# Patient Record
Sex: FEMALE | Race: WHITE | NOT HISPANIC OR LATINO | Employment: OTHER | URBAN - METROPOLITAN AREA
[De-identification: names, ages, dates, MRNs, and addresses within clinical notes are randomized per-mention and may not be internally consistent; named-entity substitution may affect disease eponyms.]

---

## 2018-10-18 ENCOUNTER — OFFICE VISIT CONVERTED (OUTPATIENT)
Dept: ORTHOPEDIC SURGERY | Facility: CLINIC | Age: 63
End: 2018-10-18
Attending: ORTHOPAEDIC SURGERY

## 2018-10-18 ENCOUNTER — CONVERSION ENCOUNTER (OUTPATIENT)
Dept: ORTHOPEDIC SURGERY | Facility: CLINIC | Age: 63
End: 2018-10-18

## 2018-10-30 ENCOUNTER — OFFICE VISIT CONVERTED (OUTPATIENT)
Dept: NEUROLOGY | Facility: CLINIC | Age: 63
End: 2018-10-30
Attending: PSYCHIATRY & NEUROLOGY

## 2018-11-05 ENCOUNTER — OFFICE VISIT CONVERTED (OUTPATIENT)
Dept: ORTHOPEDIC SURGERY | Facility: CLINIC | Age: 63
End: 2018-11-05
Attending: ORTHOPAEDIC SURGERY

## 2018-12-13 ENCOUNTER — OFFICE VISIT CONVERTED (OUTPATIENT)
Dept: CARDIOLOGY | Facility: CLINIC | Age: 63
End: 2018-12-13
Attending: SPECIALIST

## 2018-12-13 ENCOUNTER — CONVERSION ENCOUNTER (OUTPATIENT)
Dept: CARDIOLOGY | Facility: CLINIC | Age: 63
End: 2018-12-13

## 2019-01-11 ENCOUNTER — CONVERSION ENCOUNTER (OUTPATIENT)
Dept: CARDIOLOGY | Facility: CLINIC | Age: 64
End: 2019-01-11
Attending: SPECIALIST

## 2019-04-10 ENCOUNTER — HOSPITAL ENCOUNTER (OUTPATIENT)
Dept: SURGERY | Facility: HOSPITAL | Age: 64
Setting detail: HOSPITAL OUTPATIENT SURGERY
Discharge: HOME | End: 2019-04-10
Attending: OPHTHALMOLOGY

## 2019-04-10 LAB — GLUCOSE BLD-MCNC: 241 MG/DL (ref 65–99)

## 2019-04-24 ENCOUNTER — HOSPITAL ENCOUNTER (OUTPATIENT)
Dept: SURGERY | Facility: HOSPITAL | Age: 64
Setting detail: HOSPITAL OUTPATIENT SURGERY
Discharge: HOME | End: 2019-04-24
Attending: OPHTHALMOLOGY

## 2019-04-24 LAB — GLUCOSE BLD-MCNC: 149 MG/DL (ref 65–99)

## 2019-06-12 ENCOUNTER — HOSPITAL ENCOUNTER (OUTPATIENT)
Dept: OTHER | Facility: HOSPITAL | Age: 64
Discharge: HOME | End: 2019-06-12
Attending: PHYSICIAN ASSISTANT

## 2019-06-12 LAB
25(OH)D3 SERPL-MCNC: 59.3 NG/ML (ref 30–100)
ALBUMIN SERPL-MCNC: 4 G/DL (ref 3.5–5)
ALBUMIN/GLOB SERPL: 1.4 {RATIO} (ref 1.4–2.6)
ALP SERPL-CCNC: 139 U/L (ref 43–160)
ALT SERPL-CCNC: 25 U/L (ref 10–40)
ANION GAP SERPL CALC-SCNC: 13 MMOL/L (ref 8–19)
AST SERPL-CCNC: 26 U/L (ref 15–50)
BASE EXCESS BLD CALC-SCNC: 7.2 MMOL/L
BASOPHILS # BLD AUTO: 0.05 10*3/UL (ref 0–0.2)
BASOPHILS NFR BLD AUTO: 0.5 % (ref 0–3)
BILIRUB SERPL-MCNC: <0.15 MG/DL (ref 0.2–1.3)
BUN SERPL-MCNC: 29 MG/DL (ref 5–25)
BUN/CREAT SERPL: 23 {RATIO} (ref 6–20)
CALCIUM SERPL-MCNC: 9.3 MG/DL (ref 8.7–10.4)
CHLORIDE SERPL-SCNC: 94 MMOL/L (ref 99–111)
CHOLEST SERPL-MCNC: 308 MG/DL (ref 107–200)
CHOLEST/HDLC SERPL: 9.6 {RATIO} (ref 3–6)
COHGB MFR BLD: 5.1 % (ref 0–1.5)
CONV ABS IMM GRAN: 0.05 10*3/UL (ref 0–0.2)
CONV ALLEN'S TEST: ABNORMAL
CONV CO2: 36 MMOL/L (ref 22–32)
CONV CREATININE URINE, RANDOM: 57.2 MG/DL (ref 10–300)
CONV FHHB: 14.8 % (ref 0–5)
CONV FIO2: ABNORMAL % (ref 21–100)
CONV IMMATURE GRAN: 0.5 % (ref 0–1.8)
CONV MICROALBUM.,U,RANDOM: <12 MG/L (ref 0–20)
CONV SITE: ABNORMAL
CONV TOTAL PROTEIN: 6.9 G/DL (ref 6.3–8.2)
CREAT UR-MCNC: 1.26 MG/DL (ref 0.5–0.9)
DEPRECATED RDW RBC AUTO: 41.9 FL (ref 36.4–46.3)
EOSINOPHIL # BLD AUTO: 0.26 10*3/UL (ref 0–0.7)
EOSINOPHIL # BLD AUTO: 2.5 % (ref 0–7)
ERYTHROCYTE [DISTWIDTH] IN BLOOD BY AUTOMATED COUNT: 12.6 % (ref 11.7–14.4)
EST. AVERAGE GLUCOSE BLD GHB EST-MCNC: 220 MG/DL
GFR SERPLBLD BASED ON 1.73 SQ M-ARVRAT: 45 ML/MIN/{1.73_M2}
GLOBULIN UR ELPH-MCNC: 2.9 G/DL (ref 2–3.5)
GLUCOSE SERPL-MCNC: 276 MG/DL (ref 65–99)
HBA1C MFR BLD: 13 G/DL (ref 12–16)
HBA1C MFR BLD: 14.1 % (ref 11.7–14.6)
HBA1C MFR BLD: 9.3 % (ref 3.5–5.7)
HCO3 BLDA-SCNC: 35 MMOL/L (ref 22–26)
HCT VFR BLD AUTO: 40.2 % (ref 37–47)
HDLC SERPL-MCNC: 32 MG/DL (ref 40–60)
LABORATORY COMMENT REPORT: ABNORMAL
LDLC SERPL CALC-MCNC: 212 MG/DL (ref 70–100)
LITERS PER MINUTE: ABNORMAL L/MIN
LYMPHOCYTES # BLD AUTO: 3.01 10*3/UL (ref 1–5)
Lab: ABNORMAL
MCH RBC QN AUTO: 29.9 PG (ref 27–31)
MCHC RBC AUTO-ENTMCNC: 32.3 G/DL (ref 33–37)
MCV RBC AUTO: 92.4 FL (ref 81–99)
METHGB MFR BLD: 0.3 % (ref 0–1.5)
MICROALBUMIN/CREAT UR: 21 MG/G{CRE} (ref 0–35)
MONOCYTES # BLD AUTO: 0.72 10*3/UL (ref 0.2–1.2)
MONOCYTES NFR BLD AUTO: 6.8 % (ref 3–10)
NEUTROPHILS # BLD AUTO: 6.5 10*3/UL (ref 2–8)
NEUTROPHILS NFR BLD AUTO: 61.3 % (ref 30–85)
NRBC CBCN: 0 % (ref 0–0.7)
OSMOLALITY SERPL CALC.SUM OF ELEC: 304 MOSM/KG (ref 273–304)
OXYHGB MFR BLD: 79.8 % (ref 94–98)
PCO2 BLD: 64.1 MM[HG] (ref 35–45)
PH UR: 7.36 [PH] (ref 7.35–7.45)
PLATELET # BLD AUTO: 264 10*3/UL (ref 130–400)
PMV BLD AUTO: 9.6 FL (ref 9.4–12.3)
PO2 BLD: 48.7 MM[HG] (ref 80–100)
POTASSIUM SERPL-SCNC: 4 MMOL/L (ref 3.5–5.3)
RBC # BLD AUTO: 4.35 10*6/UL (ref 4.2–5.4)
SAO2 % BLDCOA: 84.4 % (ref 95–99)
SODIUM SERPL-SCNC: 139 MMOL/L (ref 135–147)
SPECIMEN SOURCE: ABNORMAL
TRIGL SERPL-MCNC: 640 MG/DL (ref 40–150)
TSH SERPL-ACNC: 1.44 M[IU]/L (ref 0.27–4.2)
VARIANT LYMPHS NFR BLD MANUAL: 28.4 % (ref 20–45)
WBC # BLD AUTO: 10.59 10*3/UL (ref 4.8–10.8)

## 2020-06-15 ENCOUNTER — LAB REQUISITION (OUTPATIENT)
Dept: LAB | Facility: HOSPITAL | Age: 65
End: 2020-06-15

## 2020-06-15 DIAGNOSIS — Z00.00 ROUTINE GENERAL MEDICAL EXAMINATION AT A HEALTH CARE FACILITY: ICD-10-CM

## 2020-06-15 LAB
INR PPP: 1.41 (ref 0.9–1.1)
PROTHROMBIN TIME: 17 SECONDS (ref 11.7–14.2)

## 2020-06-15 PROCEDURE — 85610 PROTHROMBIN TIME: CPT

## 2020-07-17 ENCOUNTER — LAB REQUISITION (OUTPATIENT)
Dept: LAB | Facility: HOSPITAL | Age: 65
End: 2020-07-17

## 2020-07-17 DIAGNOSIS — Z00.00 ROUTINE GENERAL MEDICAL EXAMINATION AT A HEALTH CARE FACILITY: ICD-10-CM

## 2020-07-17 LAB
INR PPP: 1.35 (ref 0.9–1.1)
PROTHROMBIN TIME: 16.4 SECONDS (ref 11.7–14.2)

## 2020-07-17 PROCEDURE — 85610 PROTHROMBIN TIME: CPT

## 2020-08-08 ENCOUNTER — LAB REQUISITION (OUTPATIENT)
Dept: LAB | Facility: HOSPITAL | Age: 65
End: 2020-08-08

## 2020-08-08 DIAGNOSIS — Z00.00 ROUTINE GENERAL MEDICAL EXAMINATION AT A HEALTH CARE FACILITY: ICD-10-CM

## 2020-08-08 LAB
ALBUMIN SERPL-MCNC: 3.3 G/DL (ref 3.5–5.2)
ALBUMIN/GLOB SERPL: 1.5 G/DL
ALP SERPL-CCNC: 153 U/L (ref 39–117)
ALT SERPL W P-5'-P-CCNC: 13 U/L (ref 1–33)
ANION GAP SERPL CALCULATED.3IONS-SCNC: 15.9 MMOL/L (ref 5–15)
AST SERPL-CCNC: 20 U/L (ref 1–32)
BACTERIA UR QL AUTO: ABNORMAL /HPF
BASOPHILS # BLD AUTO: 0.05 10*3/MM3 (ref 0–0.2)
BASOPHILS NFR BLD AUTO: 0.5 % (ref 0–1.5)
BILIRUB SERPL-MCNC: 0.4 MG/DL (ref 0–1.2)
BILIRUB UR QL STRIP: NEGATIVE
BUN SERPL-MCNC: 14 MG/DL (ref 8–23)
BUN/CREAT SERPL: 16.5 (ref 7–25)
CALCIUM SPEC-SCNC: 9.3 MG/DL (ref 8.6–10.5)
CHLORIDE SERPL-SCNC: 95 MMOL/L (ref 98–107)
CLARITY UR: ABNORMAL
CO2 SERPL-SCNC: 27.1 MMOL/L (ref 22–29)
COLOR UR: YELLOW
CREAT SERPL-MCNC: 0.85 MG/DL (ref 0.57–1)
DEPRECATED RDW RBC AUTO: 52.1 FL (ref 37–54)
EOSINOPHIL # BLD AUTO: 0.22 10*3/MM3 (ref 0–0.4)
EOSINOPHIL NFR BLD AUTO: 2.2 % (ref 0.3–6.2)
ERYTHROCYTE [DISTWIDTH] IN BLOOD BY AUTOMATED COUNT: 16.4 % (ref 12.3–15.4)
GFR SERPL CREATININE-BSD FRML MDRD: 67 ML/MIN/1.73
GFR SERPL CREATININE-BSD FRML MDRD: 81 ML/MIN/1.73
GLOBULIN UR ELPH-MCNC: 2.2 GM/DL
GLUCOSE SERPL-MCNC: 108 MG/DL (ref 65–99)
GLUCOSE UR STRIP-MCNC: NEGATIVE MG/DL
HCT VFR BLD AUTO: 36.5 % (ref 34–46.6)
HGB BLD-MCNC: 11.4 G/DL (ref 12–15.9)
HGB UR QL STRIP.AUTO: ABNORMAL
HYALINE CASTS UR QL AUTO: ABNORMAL /LPF
IMM GRANULOCYTES # BLD AUTO: 0.04 10*3/MM3 (ref 0–0.05)
IMM GRANULOCYTES NFR BLD AUTO: 0.4 % (ref 0–0.5)
KETONES UR QL STRIP: NEGATIVE
LEUKOCYTE ESTERASE UR QL STRIP.AUTO: ABNORMAL
LYMPHOCYTES # BLD AUTO: 2.48 10*3/MM3 (ref 0.7–3.1)
LYMPHOCYTES NFR BLD AUTO: 24.7 % (ref 19.6–45.3)
MCH RBC QN AUTO: 27.4 PG (ref 26.6–33)
MCHC RBC AUTO-ENTMCNC: 31.2 G/DL (ref 31.5–35.7)
MCV RBC AUTO: 87.7 FL (ref 79–97)
MONOCYTES # BLD AUTO: 0.79 10*3/MM3 (ref 0.1–0.9)
MONOCYTES NFR BLD AUTO: 7.9 % (ref 5–12)
NEUTROPHILS NFR BLD AUTO: 6.47 10*3/MM3 (ref 1.7–7)
NEUTROPHILS NFR BLD AUTO: 64.3 % (ref 42.7–76)
NITRITE UR QL STRIP: NEGATIVE
NRBC BLD AUTO-RTO: 0 /100 WBC (ref 0–0.2)
PH UR STRIP.AUTO: 6.5 [PH] (ref 5–8)
PLATELET # BLD AUTO: 290 10*3/MM3 (ref 140–450)
PMV BLD AUTO: 10 FL (ref 6–12)
POTASSIUM SERPL-SCNC: 4.2 MMOL/L (ref 3.5–5.2)
PROT SERPL-MCNC: 5.5 G/DL (ref 6–8.5)
PROT UR QL STRIP: ABNORMAL
RBC # BLD AUTO: 4.16 10*6/MM3 (ref 3.77–5.28)
RBC # UR: ABNORMAL /HPF
REF LAB TEST METHOD: ABNORMAL
SODIUM SERPL-SCNC: 138 MMOL/L (ref 136–145)
SP GR UR STRIP: 1.02 (ref 1–1.03)
SQUAMOUS #/AREA URNS HPF: ABNORMAL /HPF
UROBILINOGEN UR QL STRIP: ABNORMAL
WBC # BLD AUTO: 10.05 10*3/MM3 (ref 3.4–10.8)
WBC UR QL AUTO: ABNORMAL /HPF

## 2020-08-08 PROCEDURE — 81001 URINALYSIS AUTO W/SCOPE: CPT

## 2020-08-08 PROCEDURE — 80053 COMPREHEN METABOLIC PANEL: CPT

## 2020-08-08 PROCEDURE — 85025 COMPLETE CBC W/AUTO DIFF WBC: CPT

## 2020-09-02 ENCOUNTER — OFFICE VISIT CONVERTED (OUTPATIENT)
Dept: PULMONOLOGY | Facility: CLINIC | Age: 65
End: 2020-09-02
Attending: PHYSICIAN ASSISTANT

## 2020-09-22 ENCOUNTER — HOSPITAL ENCOUNTER (OUTPATIENT)
Dept: GENERAL RADIOLOGY | Facility: HOSPITAL | Age: 65
Discharge: HOME | End: 2020-09-22
Attending: PHYSICIAN ASSISTANT

## 2020-09-28 ENCOUNTER — HOSPITAL ENCOUNTER (OUTPATIENT)
Dept: SLEEP MEDICINE | Facility: HOSPITAL | Age: 65
Discharge: HOME | End: 2020-09-28
Attending: PHYSICIAN ASSISTANT

## 2020-09-30 ENCOUNTER — HOSPITAL ENCOUNTER (OUTPATIENT)
Dept: PET IMAGING | Facility: HOSPITAL | Age: 65
Discharge: HOME | End: 2020-09-30
Attending: INTERNAL MEDICINE

## 2020-09-30 ENCOUNTER — OUTSIDE FACILITY SERVICE (OUTPATIENT)
Dept: SLEEP MEDICINE | Facility: HOSPITAL | Age: 65
End: 2020-09-30

## 2020-09-30 PROCEDURE — 95810 POLYSOM 6/> YRS 4/> PARAM: CPT | Performed by: INTERNAL MEDICINE

## 2020-10-01 ENCOUNTER — OFFICE VISIT CONVERTED (OUTPATIENT)
Dept: PULMONOLOGY | Facility: CLINIC | Age: 65
End: 2020-10-01
Attending: INTERNAL MEDICINE

## 2020-10-22 ENCOUNTER — OFFICE VISIT CONVERTED (OUTPATIENT)
Dept: OTOLARYNGOLOGY | Facility: CLINIC | Age: 65
End: 2020-10-22
Attending: NURSE PRACTITIONER

## 2020-12-03 ENCOUNTER — HOSPITAL ENCOUNTER (OUTPATIENT)
Dept: CT IMAGING | Facility: HOSPITAL | Age: 65
Discharge: HOME | End: 2020-12-03

## 2020-12-10 ENCOUNTER — OFFICE VISIT CONVERTED (OUTPATIENT)
Dept: PULMONOLOGY | Facility: CLINIC | Age: 65
End: 2020-12-10
Attending: NURSE PRACTITIONER

## 2020-12-29 ENCOUNTER — CONVERSION ENCOUNTER (OUTPATIENT)
Dept: ORTHOPEDIC SURGERY | Facility: CLINIC | Age: 65
End: 2020-12-29

## 2020-12-29 ENCOUNTER — OFFICE VISIT CONVERTED (OUTPATIENT)
Dept: ORTHOPEDIC SURGERY | Facility: CLINIC | Age: 65
End: 2020-12-29
Attending: ORTHOPAEDIC SURGERY

## 2021-01-01 ENCOUNTER — TRANSCRIBE ORDERS (OUTPATIENT)
Dept: ADMINISTRATIVE | Facility: HOSPITAL | Age: 66
End: 2021-01-01

## 2021-01-01 ENCOUNTER — HOSPITAL ENCOUNTER (INPATIENT)
Facility: HOSPITAL | Age: 66
LOS: 2 days | Discharge: HOME HEALTH | End: 2021-09-05
Attending: EMERGENCY MEDICINE | Admitting: INTERNAL MEDICINE

## 2021-01-01 ENCOUNTER — APPOINTMENT (OUTPATIENT)
Dept: CARDIOLOGY | Facility: HOSPITAL | Age: 66
End: 2021-01-01

## 2021-01-01 ENCOUNTER — TELEPHONE (OUTPATIENT)
Dept: GASTROENTEROLOGY | Facility: CLINIC | Age: 66
End: 2021-01-01

## 2021-01-01 ENCOUNTER — OFFICE VISIT CONVERTED (OUTPATIENT)
Dept: ORTHOPEDIC SURGERY | Facility: CLINIC | Age: 66
End: 2021-01-01
Attending: PHYSICIAN ASSISTANT

## 2021-01-01 ENCOUNTER — HOSPITAL ENCOUNTER (EMERGENCY)
Facility: HOSPITAL | Age: 66
Discharge: HOME | End: 2021-12-19
Attending: EMERGENCY MEDICINE | Admitting: EMERGENCY MEDICINE

## 2021-01-01 ENCOUNTER — HOSPITAL ENCOUNTER (EMERGENCY)
Facility: HOSPITAL | Age: 66
Discharge: HOME | End: 2021-09-10
Attending: EMERGENCY MEDICINE | Admitting: EMERGENCY MEDICINE

## 2021-01-01 ENCOUNTER — APPOINTMENT (OUTPATIENT)
Dept: GENERAL RADIOLOGY | Facility: HOSPITAL | Age: 66
End: 2021-01-01

## 2021-01-01 ENCOUNTER — READMISSION MANAGEMENT (OUTPATIENT)
Dept: CALL CENTER | Facility: HOSPITAL | Age: 66
End: 2021-01-01

## 2021-01-01 ENCOUNTER — OFFICE VISIT (OUTPATIENT)
Dept: ORTHOPEDIC SURGERY | Facility: CLINIC | Age: 66
End: 2021-01-01

## 2021-01-01 ENCOUNTER — HOSPITAL ENCOUNTER (EMERGENCY)
Facility: HOSPITAL | Age: 66
Discharge: HOME | End: 2021-11-26
Attending: EMERGENCY MEDICINE | Admitting: EMERGENCY MEDICINE

## 2021-01-01 ENCOUNTER — APPOINTMENT (OUTPATIENT)
Dept: CT IMAGING | Facility: HOSPITAL | Age: 66
End: 2021-01-01

## 2021-01-01 ENCOUNTER — CONVERSION ENCOUNTER (OUTPATIENT)
Dept: OTHER | Facility: HOSPITAL | Age: 66
End: 2021-01-01

## 2021-01-01 VITALS — OXYGEN SATURATION: 96 % | HEART RATE: 75 BPM | BODY MASS INDEX: 38.28 KG/M2 | WEIGHT: 195 LBS | HEIGHT: 60 IN

## 2021-01-01 VITALS
HEART RATE: 74 BPM | DIASTOLIC BLOOD PRESSURE: 60 MMHG | TEMPERATURE: 97.8 F | WEIGHT: 170 LBS | RESPIRATION RATE: 15 BRPM | HEIGHT: 59 IN | SYSTOLIC BLOOD PRESSURE: 140 MMHG | BODY MASS INDEX: 34.27 KG/M2 | OXYGEN SATURATION: 96 %

## 2021-01-01 VITALS
HEIGHT: 60 IN | SYSTOLIC BLOOD PRESSURE: 129 MMHG | DIASTOLIC BLOOD PRESSURE: 84 MMHG | TEMPERATURE: 98.6 F | HEART RATE: 79 BPM | BODY MASS INDEX: 35.23 KG/M2 | OXYGEN SATURATION: 95 % | WEIGHT: 179.45 LBS | RESPIRATION RATE: 20 BRPM

## 2021-01-01 VITALS
BODY MASS INDEX: 36.98 KG/M2 | WEIGHT: 183.42 LBS | OXYGEN SATURATION: 100 % | SYSTOLIC BLOOD PRESSURE: 142 MMHG | RESPIRATION RATE: 19 BRPM | DIASTOLIC BLOOD PRESSURE: 59 MMHG | HEIGHT: 59 IN | TEMPERATURE: 100.4 F | HEART RATE: 83 BPM

## 2021-01-01 VITALS
WEIGHT: 185.41 LBS | SYSTOLIC BLOOD PRESSURE: 166 MMHG | TEMPERATURE: 98 F | OXYGEN SATURATION: 100 % | RESPIRATION RATE: 18 BRPM | HEIGHT: 60 IN | HEART RATE: 68 BPM | BODY MASS INDEX: 36.4 KG/M2 | DIASTOLIC BLOOD PRESSURE: 76 MMHG

## 2021-01-01 VITALS — OXYGEN SATURATION: 96 % | HEART RATE: 67 BPM | BODY MASS INDEX: 36.32 KG/M2 | HEIGHT: 60 IN | WEIGHT: 185 LBS

## 2021-01-01 VITALS — OXYGEN SATURATION: 82 % | HEIGHT: 60 IN | WEIGHT: 234.12 LBS | BODY MASS INDEX: 45.96 KG/M2 | HEART RATE: 114 BPM

## 2021-01-01 VITALS
RESPIRATION RATE: 12 BRPM | DIASTOLIC BLOOD PRESSURE: 59 MMHG | TEMPERATURE: 97.5 F | HEIGHT: 59 IN | OXYGEN SATURATION: 96 % | HEART RATE: 65 BPM | BODY MASS INDEX: 34.35 KG/M2 | SYSTOLIC BLOOD PRESSURE: 183 MMHG | WEIGHT: 170.37 LBS

## 2021-01-01 VITALS
BODY MASS INDEX: 36.4 KG/M2 | WEIGHT: 180.56 LBS | DIASTOLIC BLOOD PRESSURE: 53 MMHG | OXYGEN SATURATION: 98 % | SYSTOLIC BLOOD PRESSURE: 119 MMHG | HEART RATE: 98 BPM | RESPIRATION RATE: 16 BRPM | HEIGHT: 59 IN | TEMPERATURE: 98.2 F

## 2021-01-01 VITALS
RESPIRATION RATE: 16 BRPM | BODY MASS INDEX: 34.27 KG/M2 | OXYGEN SATURATION: 94 % | DIASTOLIC BLOOD PRESSURE: 106 MMHG | HEART RATE: 84 BPM | TEMPERATURE: 96.7 F | SYSTOLIC BLOOD PRESSURE: 150 MMHG | HEIGHT: 59 IN | WEIGHT: 170 LBS

## 2021-01-01 VITALS
HEIGHT: 59 IN | BODY MASS INDEX: 47.17 KG/M2 | SYSTOLIC BLOOD PRESSURE: 108 MMHG | HEART RATE: 100 BPM | WEIGHT: 234 LBS | DIASTOLIC BLOOD PRESSURE: 56 MMHG

## 2021-01-01 VITALS — HEART RATE: 89 BPM | WEIGHT: 210 LBS | OXYGEN SATURATION: 90 % | HEIGHT: 59 IN | BODY MASS INDEX: 42.33 KG/M2

## 2021-01-01 VITALS — HEIGHT: 59 IN | RESPIRATION RATE: 22 BRPM | TEMPERATURE: 96.9 F

## 2021-01-01 VITALS — HEART RATE: 80 BPM | BODY MASS INDEX: 34.63 KG/M2 | OXYGEN SATURATION: 90 % | WEIGHT: 176.37 LBS | HEIGHT: 60 IN

## 2021-01-01 VITALS
OXYGEN SATURATION: 98 % | HEART RATE: 59 BPM | SYSTOLIC BLOOD PRESSURE: 126 MMHG | RESPIRATION RATE: 15 BRPM | TEMPERATURE: 98 F | DIASTOLIC BLOOD PRESSURE: 60 MMHG

## 2021-01-01 VITALS — HEART RATE: 78 BPM | OXYGEN SATURATION: 90 % | WEIGHT: 185 LBS

## 2021-01-01 VITALS — BODY MASS INDEX: 46.13 KG/M2 | HEART RATE: 113 BPM | OXYGEN SATURATION: 82 % | HEIGHT: 60 IN | WEIGHT: 235 LBS

## 2021-01-01 DIAGNOSIS — Z79.01 WARFARIN ANTICOAGULATION: ICD-10-CM

## 2021-01-01 DIAGNOSIS — S00.03XA CONTUSION OF SCALP, INITIAL ENCOUNTER: Primary | ICD-10-CM

## 2021-01-01 DIAGNOSIS — R19.7 DIARRHEA, UNSPECIFIED TYPE: ICD-10-CM

## 2021-01-01 DIAGNOSIS — S63.501A SPRAIN OF RIGHT WRIST, INITIAL ENCOUNTER: ICD-10-CM

## 2021-01-01 DIAGNOSIS — D68.9 COAGULOPATHY (HCC): ICD-10-CM

## 2021-01-01 DIAGNOSIS — L03.116 CELLULITIS OF LEFT LOWER EXTREMITY: Primary | ICD-10-CM

## 2021-01-01 DIAGNOSIS — J44.1 COPD EXACERBATION (HCC): ICD-10-CM

## 2021-01-01 DIAGNOSIS — S42.294D OTHER CLOSED NONDISPLACED FRACTURE OF PROXIMAL END OF RIGHT HUMERUS WITH ROUTINE HEALING, SUBSEQUENT ENCOUNTER: Primary | ICD-10-CM

## 2021-01-01 DIAGNOSIS — Z00.00 ROUTINE GENERAL MEDICAL EXAMINATION AT A HEALTH CARE FACILITY: Primary | ICD-10-CM

## 2021-01-01 DIAGNOSIS — R10.84 GENERALIZED ABDOMINAL PAIN: Primary | ICD-10-CM

## 2021-01-01 DIAGNOSIS — R53.1 WEAKNESS: Primary | ICD-10-CM

## 2021-01-01 DIAGNOSIS — S63.502A SPRAIN OF LEFT WRIST, INITIAL ENCOUNTER: ICD-10-CM

## 2021-01-01 DIAGNOSIS — C50.911 NEOPLASM OF RIGHT BREAST, PRIMARY TUMOR STAGING CATEGORY T4B: ULCERATION AND/OR IPSILATERAL SATELLITE NODULES AND/OR EDEMA (INCLUDING PEAU D'ORANGE) OF SKIN, EXCLUDING INFLAMMATORY CARCINOMA (HCC): Primary | ICD-10-CM

## 2021-01-01 DIAGNOSIS — N28.9 RENAL INSUFFICIENCY: ICD-10-CM

## 2021-01-01 DIAGNOSIS — D64.9 ANEMIA, UNSPECIFIED TYPE: ICD-10-CM

## 2021-01-01 DIAGNOSIS — E86.0 DEHYDRATION: ICD-10-CM

## 2021-01-01 DIAGNOSIS — E83.42 HYPOMAGNESEMIA: ICD-10-CM

## 2021-01-01 DIAGNOSIS — J96.21 ACUTE ON CHRONIC RESPIRATORY FAILURE WITH HYPOXIA (HCC): ICD-10-CM

## 2021-01-01 DIAGNOSIS — S00.01XA ABRASION OF SCALP, INITIAL ENCOUNTER: ICD-10-CM

## 2021-01-01 LAB
ALBUMIN SERPL-MCNC: 2.7 G/DL (ref 3.5–5.2)
ALBUMIN SERPL-MCNC: 3.1 G/DL (ref 3.5–5.2)
ALBUMIN SERPL-MCNC: 3.2 G/DL (ref 3.5–5.2)
ALBUMIN/GLOB SERPL: 0.9 G/DL
ALBUMIN/GLOB SERPL: 0.9 G/DL
ALBUMIN/GLOB SERPL: 1.2 G/DL
ALP SERPL-CCNC: 107 U/L (ref 39–117)
ALP SERPL-CCNC: 129 U/L (ref 39–117)
ALP SERPL-CCNC: 94 U/L (ref 39–117)
ALT SERPL W P-5'-P-CCNC: 15 U/L (ref 1–33)
ALT SERPL W P-5'-P-CCNC: 6 U/L (ref 1–33)
ALT SERPL W P-5'-P-CCNC: 8 U/L (ref 1–33)
ANION GAP SERPL CALCULATED.3IONS-SCNC: 10.5 MMOL/L (ref 5–15)
ANION GAP SERPL CALCULATED.3IONS-SCNC: 10.7 MMOL/L (ref 5–15)
ANION GAP SERPL CALCULATED.3IONS-SCNC: 6.9 MMOL/L (ref 5–15)
ANION GAP SERPL CALCULATED.3IONS-SCNC: 9.5 MMOL/L (ref 5–15)
APTT PPP: 42.1 SECONDS (ref 22.2–34.2)
ARTERIAL PATENCY WRIST A: POSITIVE
AST SERPL-CCNC: 19 U/L (ref 1–32)
AST SERPL-CCNC: 22 U/L (ref 1–32)
AST SERPL-CCNC: 29 U/L (ref 1–32)
BACTERIA UR QL AUTO: ABNORMAL /HPF
BASE EXCESS BLDA CALC-SCNC: -0.4 MMOL/L (ref -2–2)
BASOPHILS # BLD AUTO: 0.02 10*3/MM3 (ref 0–0.2)
BASOPHILS # BLD AUTO: 0.03 10*3/MM3 (ref 0–0.2)
BASOPHILS # BLD AUTO: 0.04 10*3/MM3 (ref 0–0.2)
BASOPHILS # BLD AUTO: 0.05 10*3/MM3 (ref 0–0.2)
BASOPHILS # BLD AUTO: 0.06 10*3/MM3 (ref 0–0.2)
BASOPHILS NFR BLD AUTO: 0.2 % (ref 0–1.5)
BASOPHILS NFR BLD AUTO: 0.3 % (ref 0–1.5)
BASOPHILS NFR BLD AUTO: 0.3 % (ref 0–1.5)
BASOPHILS NFR BLD AUTO: 0.4 % (ref 0–1.5)
BASOPHILS NFR BLD AUTO: 0.6 % (ref 0–1.5)
BDY SITE: ABNORMAL
BH CV LOWER VASCULAR LEFT COMMON FEMORAL AUGMENT: NORMAL
BH CV LOWER VASCULAR LEFT COMMON FEMORAL COMPRESS: NORMAL
BH CV LOWER VASCULAR LEFT COMMON FEMORAL PHASIC: NORMAL
BH CV LOWER VASCULAR LEFT COMMON FEMORAL SPONT: NORMAL
BH CV LOWER VASCULAR LEFT DISTAL FEMORAL AUGMENT: NORMAL
BH CV LOWER VASCULAR LEFT DISTAL FEMORAL COMPETENT: NORMAL
BH CV LOWER VASCULAR LEFT DISTAL FEMORAL COMPRESS: NORMAL
BH CV LOWER VASCULAR LEFT DISTAL FEMORAL PHASIC: NORMAL
BH CV LOWER VASCULAR LEFT DISTAL FEMORAL SPONT: NORMAL
BH CV LOWER VASCULAR LEFT GREATER SAPH AK COMPRESS: NORMAL
BH CV LOWER VASCULAR LEFT MID FEMORAL COMPRESS: NORMAL
BH CV LOWER VASCULAR LEFT PERONEAL COMPRESS: NORMAL
BH CV LOWER VASCULAR LEFT POPLITEAL AUGMENT: NORMAL
BH CV LOWER VASCULAR LEFT POPLITEAL COMPETENT: NORMAL
BH CV LOWER VASCULAR LEFT POPLITEAL COMPRESS: NORMAL
BH CV LOWER VASCULAR LEFT POPLITEAL PHASIC: NORMAL
BH CV LOWER VASCULAR LEFT POPLITEAL SPONT: NORMAL
BH CV LOWER VASCULAR LEFT POSTERIOR TIBIAL AUGMENT: NORMAL
BH CV LOWER VASCULAR LEFT POSTERIOR TIBIAL COMPRESS: NORMAL
BH CV LOWER VASCULAR LEFT PROXIMAL FEMORAL COMPRESS: NORMAL
BH CV LOWER VASCULAR LEFT SAPHENOFEMORAL JUNCTION COMPRESS: NORMAL
BH CV LOWER VASCULAR RIGHT COMMON FEMORAL AUGMENT: NORMAL
BH CV LOWER VASCULAR RIGHT COMMON FEMORAL COMPRESS: NORMAL
BH CV LOWER VASCULAR RIGHT COMMON FEMORAL PHASIC: NORMAL
BH CV LOWER VASCULAR RIGHT COMMON FEMORAL SPONT: NORMAL
BILIRUB SERPL-MCNC: 0.2 MG/DL (ref 0–1.2)
BILIRUB SERPL-MCNC: 0.2 MG/DL (ref 0–1.2)
BILIRUB SERPL-MCNC: 0.5 MG/DL (ref 0–1.2)
BILIRUB UR QL STRIP: NEGATIVE
BUN SERPL-MCNC: 25 MG/DL (ref 8–23)
BUN SERPL-MCNC: 26 MG/DL (ref 8–23)
BUN SERPL-MCNC: 31 MG/DL (ref 8–23)
BUN SERPL-MCNC: 39 MG/DL (ref 8–23)
BUN/CREAT SERPL: 20.7 (ref 7–25)
BUN/CREAT SERPL: 22.2 (ref 7–25)
BUN/CREAT SERPL: 28.2 (ref 7–25)
BUN/CREAT SERPL: 29.1 (ref 7–25)
CALCIUM SPEC-SCNC: 8.6 MG/DL (ref 8.6–10.5)
CALCIUM SPEC-SCNC: 8.7 MG/DL (ref 8.6–10.5)
CALCIUM SPEC-SCNC: 9 MG/DL (ref 8.6–10.5)
CALCIUM SPEC-SCNC: 9.3 MG/DL (ref 8.6–10.5)
CHLORIDE SERPL-SCNC: 100 MMOL/L (ref 98–107)
CHLORIDE SERPL-SCNC: 101 MMOL/L (ref 98–107)
CHLORIDE SERPL-SCNC: 102 MMOL/L (ref 98–107)
CHLORIDE SERPL-SCNC: 104 MMOL/L (ref 98–107)
CLARITY UR: CLEAR
CO2 SERPL-SCNC: 23.1 MMOL/L (ref 22–29)
CO2 SERPL-SCNC: 23.3 MMOL/L (ref 22–29)
CO2 SERPL-SCNC: 24.5 MMOL/L (ref 22–29)
CO2 SERPL-SCNC: 25.5 MMOL/L (ref 22–29)
COHGB MFR BLD: 0.6 % (ref 0–1.5)
COLOR UR: YELLOW
CREAT SERPL-MCNC: 1.1 MG/DL (ref 0.57–1)
CREAT SERPL-MCNC: 1.17 MG/DL (ref 0.57–1)
CREAT SERPL-MCNC: 1.21 MG/DL (ref 0.57–1)
CREAT SERPL-MCNC: 1.34 MG/DL (ref 0.57–1)
DEPRECATED RDW RBC AUTO: 51.8 FL (ref 37–54)
DEPRECATED RDW RBC AUTO: 52.3 FL (ref 37–54)
DEPRECATED RDW RBC AUTO: 53.7 FL (ref 37–54)
DEPRECATED RDW RBC AUTO: 58.8 FL (ref 37–54)
DEPRECATED RDW RBC AUTO: 61.1 FL (ref 37–54)
EOSINOPHIL # BLD AUTO: 0 10*3/MM3 (ref 0–0.4)
EOSINOPHIL # BLD AUTO: 0.03 10*3/MM3 (ref 0–0.4)
EOSINOPHIL # BLD AUTO: 0.15 10*3/MM3 (ref 0–0.4)
EOSINOPHIL # BLD AUTO: 0.29 10*3/MM3 (ref 0–0.4)
EOSINOPHIL # BLD AUTO: 0.37 10*3/MM3 (ref 0–0.4)
EOSINOPHIL NFR BLD AUTO: 0 % (ref 0.3–6.2)
EOSINOPHIL NFR BLD AUTO: 0.2 % (ref 0.3–6.2)
EOSINOPHIL NFR BLD AUTO: 1.1 % (ref 0.3–6.2)
EOSINOPHIL NFR BLD AUTO: 3.2 % (ref 0.3–6.2)
EOSINOPHIL NFR BLD AUTO: 4 % (ref 0.3–6.2)
ERYTHROCYTE [DISTWIDTH] IN BLOOD BY AUTOMATED COUNT: 15.1 % (ref 12.3–15.4)
ERYTHROCYTE [DISTWIDTH] IN BLOOD BY AUTOMATED COUNT: 15.5 % (ref 12.3–15.4)
ERYTHROCYTE [DISTWIDTH] IN BLOOD BY AUTOMATED COUNT: 15.9 % (ref 12.3–15.4)
ERYTHROCYTE [DISTWIDTH] IN BLOOD BY AUTOMATED COUNT: 18.2 % (ref 12.3–15.4)
ERYTHROCYTE [DISTWIDTH] IN BLOOD BY AUTOMATED COUNT: 19.2 % (ref 12.3–15.4)
FHHB: 6.4 % (ref 0–5)
FLUAV AG NPH QL: NEGATIVE
FLUBV AG NPH QL IA: NEGATIVE
GAS FLOW AIRWAY: 2 LPM
GFR SERPL CREATININE-BSD FRML MDRD: 39 ML/MIN/1.73
GFR SERPL CREATININE-BSD FRML MDRD: 44 ML/MIN/1.73
GFR SERPL CREATININE-BSD FRML MDRD: 46 ML/MIN/1.73
GFR SERPL CREATININE-BSD FRML MDRD: 50 ML/MIN/1.73
GLOBULIN UR ELPH-MCNC: 2.6 GM/DL
GLOBULIN UR ELPH-MCNC: 2.9 GM/DL
GLOBULIN UR ELPH-MCNC: 3.7 GM/DL
GLUCOSE BLDC GLUCOMTR-MCNC: 239 MG/DL (ref 70–99)
GLUCOSE BLDC GLUCOMTR-MCNC: 273 MG/DL (ref 70–99)
GLUCOSE BLDC GLUCOMTR-MCNC: 290 MG/DL (ref 70–99)
GLUCOSE BLDC GLUCOMTR-MCNC: 299 MG/DL (ref 70–99)
GLUCOSE BLDC GLUCOMTR-MCNC: 302 MG/DL (ref 70–99)
GLUCOSE SERPL-MCNC: 212 MG/DL (ref 65–99)
GLUCOSE SERPL-MCNC: 266 MG/DL (ref 65–99)
GLUCOSE SERPL-MCNC: 268 MG/DL (ref 65–99)
GLUCOSE SERPL-MCNC: 290 MG/DL (ref 65–99)
GLUCOSE UR STRIP-MCNC: ABNORMAL MG/DL
HCO3 BLDA-SCNC: 23.9 MMOL/L (ref 22–26)
HCT VFR BLD AUTO: 26.9 % (ref 34–46.6)
HCT VFR BLD AUTO: 27.5 % (ref 34–46.6)
HCT VFR BLD AUTO: 29.7 % (ref 34–46.6)
HCT VFR BLD AUTO: 29.8 % (ref 34–46.6)
HCT VFR BLD AUTO: 44.8 % (ref 34–46.6)
HGB BLD-MCNC: 13.9 G/DL (ref 12–15.9)
HGB BLD-MCNC: 8.5 G/DL (ref 12–15.9)
HGB BLD-MCNC: 9.3 G/DL (ref 12–15.9)
HGB BLD-MCNC: 9.7 G/DL (ref 12–15.9)
HGB BLD-MCNC: 9.8 G/DL (ref 12–15.9)
HGB BLDA-MCNC: 11.4 G/DL (ref 11.7–14.6)
HGB UR QL STRIP.AUTO: ABNORMAL
HOLD SPECIMEN: NORMAL
HYALINE CASTS UR QL AUTO: ABNORMAL /LPF
IMM GRANULOCYTES # BLD AUTO: 0.04 10*3/MM3 (ref 0–0.05)
IMM GRANULOCYTES # BLD AUTO: 0.08 10*3/MM3 (ref 0–0.05)
IMM GRANULOCYTES # BLD AUTO: 0.09 10*3/MM3 (ref 0–0.05)
IMM GRANULOCYTES # BLD AUTO: 0.11 10*3/MM3 (ref 0–0.05)
IMM GRANULOCYTES # BLD AUTO: 0.38 10*3/MM3 (ref 0–0.05)
IMM GRANULOCYTES NFR BLD AUTO: 0.3 % (ref 0–0.5)
IMM GRANULOCYTES NFR BLD AUTO: 0.6 % (ref 0–0.5)
IMM GRANULOCYTES NFR BLD AUTO: 0.9 % (ref 0–0.5)
IMM GRANULOCYTES NFR BLD AUTO: 1 % (ref 0–0.5)
IMM GRANULOCYTES NFR BLD AUTO: 4.1 % (ref 0–0.5)
INHALED O2 CONCENTRATION: 28 %
INR 570 REPEAT: 5.06 (ref 2–3)
INR PPP: 1.85 (ref 2–3)
INR PPP: 2.6 (ref 2–3)
INR PPP: 3.42 (ref 2–3)
INR PPP: 3.54 (ref 2–3)
INR PPP: 4.35 (ref 2–3)
KETONES UR QL STRIP: NEGATIVE
LEUKOCYTE ESTERASE UR QL STRIP.AUTO: NEGATIVE
LIPASE SERPL-CCNC: 33 U/L (ref 13–60)
LYMPHOCYTES # BLD AUTO: 0.65 10*3/MM3 (ref 0.7–3.1)
LYMPHOCYTES # BLD AUTO: 0.78 10*3/MM3 (ref 0.7–3.1)
LYMPHOCYTES # BLD AUTO: 1.36 10*3/MM3 (ref 0.7–3.1)
LYMPHOCYTES # BLD AUTO: 1.71 10*3/MM3 (ref 0.7–3.1)
LYMPHOCYTES # BLD AUTO: 1.78 10*3/MM3 (ref 0.7–3.1)
LYMPHOCYTES NFR BLD AUTO: 12.8 % (ref 19.6–45.3)
LYMPHOCYTES NFR BLD AUTO: 14.6 % (ref 19.6–45.3)
LYMPHOCYTES NFR BLD AUTO: 19.1 % (ref 19.6–45.3)
LYMPHOCYTES NFR BLD AUTO: 5.3 % (ref 19.6–45.3)
LYMPHOCYTES NFR BLD AUTO: 6.2 % (ref 19.6–45.3)
MAGNESIUM SERPL-MCNC: 1.3 MG/DL (ref 1.6–2.4)
MAXIMAL PREDICTED HEART RATE: 153 BPM
MCH RBC QN AUTO: 28.4 PG (ref 26.6–33)
MCH RBC QN AUTO: 29.7 PG (ref 26.6–33)
MCH RBC QN AUTO: 30.5 PG (ref 26.6–33)
MCH RBC QN AUTO: 30.6 PG (ref 26.6–33)
MCH RBC QN AUTO: 31.8 PG (ref 26.6–33)
MCHC RBC AUTO-ENTMCNC: 31 G/DL (ref 31.5–35.7)
MCHC RBC AUTO-ENTMCNC: 31.6 G/DL (ref 31.5–35.7)
MCHC RBC AUTO-ENTMCNC: 32.6 G/DL (ref 31.5–35.7)
MCHC RBC AUTO-ENTMCNC: 33 G/DL (ref 31.5–35.7)
MCHC RBC AUTO-ENTMCNC: 33.8 G/DL (ref 31.5–35.7)
MCV RBC AUTO: 90 FL (ref 79–97)
MCV RBC AUTO: 91.6 FL (ref 79–97)
MCV RBC AUTO: 93.7 FL (ref 79–97)
MCV RBC AUTO: 94.2 FL (ref 79–97)
MCV RBC AUTO: 96.8 FL (ref 79–97)
METHGB BLD QL: 0.2 % (ref 0–1.5)
MODALITY: ABNORMAL
MONOCYTES # BLD AUTO: 0.6 10*3/MM3 (ref 0.1–0.9)
MONOCYTES # BLD AUTO: 0.62 10*3/MM3 (ref 0.1–0.9)
MONOCYTES # BLD AUTO: 0.72 10*3/MM3 (ref 0.1–0.9)
MONOCYTES # BLD AUTO: 0.8 10*3/MM3 (ref 0.1–0.9)
MONOCYTES # BLD AUTO: 0.99 10*3/MM3 (ref 0.1–0.9)
MONOCYTES NFR BLD AUTO: 10.6 % (ref 5–12)
MONOCYTES NFR BLD AUTO: 4.9 % (ref 5–12)
MONOCYTES NFR BLD AUTO: 5 % (ref 5–12)
MONOCYTES NFR BLD AUTO: 5.8 % (ref 5–12)
MONOCYTES NFR BLD AUTO: 8.1 % (ref 5–12)
NEUTROPHILS NFR BLD AUTO: 10.76 10*3/MM3 (ref 1.7–7)
NEUTROPHILS NFR BLD AUTO: 11.02 10*3/MM3 (ref 1.7–7)
NEUTROPHILS NFR BLD AUTO: 11.08 10*3/MM3 (ref 1.7–7)
NEUTROPHILS NFR BLD AUTO: 6.07 10*3/MM3 (ref 1.7–7)
NEUTROPHILS NFR BLD AUTO: 6.17 10*3/MM3 (ref 1.7–7)
NEUTROPHILS NFR BLD AUTO: 66.4 % (ref 42.7–76)
NEUTROPHILS NFR BLD AUTO: 68 % (ref 42.7–76)
NEUTROPHILS NFR BLD AUTO: 79.6 % (ref 42.7–76)
NEUTROPHILS NFR BLD AUTO: 88 % (ref 42.7–76)
NEUTROPHILS NFR BLD AUTO: 88.4 % (ref 42.7–76)
NITRITE UR QL STRIP: NEGATIVE
NRBC BLD AUTO-RTO: 0 /100 WBC (ref 0–0.2)
NRBC BLD AUTO-RTO: 0.2 /100 WBC (ref 0–0.2)
NRBC BLD AUTO-RTO: 0.2 /100 WBC (ref 0–0.2)
OXYHGB MFR BLDV: 92.8 % (ref 94–99)
PCO2 BLDA: 38 MM HG (ref 35–45)
PH BLDA: 7.42 PH UNITS (ref 7.35–7.45)
PH UR STRIP.AUTO: <=5 [PH] (ref 5–8)
PLATELET # BLD AUTO: 136 10*3/MM3 (ref 140–450)
PLATELET # BLD AUTO: 185 10*3/MM3 (ref 140–450)
PLATELET # BLD AUTO: 248 10*3/MM3 (ref 140–450)
PLATELET # BLD AUTO: 255 10*3/MM3 (ref 140–450)
PLATELET # BLD AUTO: 256 10*3/MM3 (ref 140–450)
PMV BLD AUTO: 10.2 FL (ref 6–12)
PMV BLD AUTO: 9.2 FL (ref 6–12)
PMV BLD AUTO: 9.6 FL (ref 6–12)
PMV BLD AUTO: 9.7 FL (ref 6–12)
PMV BLD AUTO: 9.9 FL (ref 6–12)
PO2 BLD: 245 MM[HG] (ref 0–500)
PO2 BLDA: 68.5 MM HG (ref 80–100)
POTASSIUM SERPL-SCNC: 3.8 MMOL/L (ref 3.5–5.2)
POTASSIUM SERPL-SCNC: 4.4 MMOL/L (ref 3.5–5.2)
POTASSIUM SERPL-SCNC: 4.9 MMOL/L (ref 3.5–5.2)
POTASSIUM SERPL-SCNC: 5 MMOL/L (ref 3.5–5.2)
PROT SERPL-MCNC: 5.6 G/DL (ref 6–8.5)
PROT SERPL-MCNC: 5.7 G/DL (ref 6–8.5)
PROT SERPL-MCNC: 6.9 G/DL (ref 6–8.5)
PROT UR QL STRIP: ABNORMAL
PROTHROMBIN TIME: 18.8 SECONDS (ref 9.4–12)
PROTHROMBIN TIME: 26.4 SECONDS (ref 9.4–12)
PROTHROMBIN TIME: 33 SECONDS (ref 9.4–12)
PROTHROMBIN TIME: 35.9 SECONDS (ref 9.4–12)
PROTHROMBIN TIME: 44.2 SECONDS (ref 9.4–12)
PT 570 REPEAT: 49.4 SECONDS (ref 9.4–12)
QT INTERVAL: 376 MS
QT INTERVAL: 393 MS
RBC # BLD AUTO: 2.78 10*6/MM3 (ref 3.77–5.28)
RBC # BLD AUTO: 2.92 10*6/MM3 (ref 3.77–5.28)
RBC # BLD AUTO: 3.18 10*6/MM3 (ref 3.77–5.28)
RBC # BLD AUTO: 3.3 10*6/MM3 (ref 3.77–5.28)
RBC # BLD AUTO: 4.89 10*6/MM3 (ref 3.77–5.28)
RBC # UR: ABNORMAL /HPF
REF LAB TEST METHOD: ABNORMAL
SAO2 % BLDCOA: 93.5 % (ref 95–99)
SARS-COV-2 N GENE RESP QL NAA+PROBE: NOT DETECTED
SODIUM SERPL-SCNC: 131 MMOL/L (ref 136–145)
SODIUM SERPL-SCNC: 135 MMOL/L (ref 136–145)
SODIUM SERPL-SCNC: 137 MMOL/L (ref 136–145)
SODIUM SERPL-SCNC: 138 MMOL/L (ref 136–145)
SP GR UR STRIP: 1.02 (ref 1–1.03)
SQUAMOUS #/AREA URNS HPF: ABNORMAL /HPF
STRESS TARGET HR: 130 BPM
T4 FREE SERPL-MCNC: 0.92 NG/DL (ref 0.93–1.7)
TROPONIN T SERPL-MCNC: <0.01 NG/ML (ref 0–0.03)
TROPONIN T SERPL-MCNC: <0.01 NG/ML (ref 0–0.03)
TSH SERPL DL<=0.05 MIU/L-ACNC: 1.3 UIU/ML (ref 0.27–4.2)
UROBILINOGEN UR QL STRIP: ABNORMAL
WBC # BLD AUTO: 12.19 10*3/MM3 (ref 3.4–10.8)
WBC # BLD AUTO: 12.52 10*3/MM3 (ref 3.4–10.8)
WBC # BLD AUTO: 9.3 10*3/MM3 (ref 3.4–10.8)
WBC NRBC COR # BLD: 13.91 10*3/MM3 (ref 3.4–10.8)
WBC NRBC COR # BLD: 8.93 10*3/MM3 (ref 3.4–10.8)
WBC UR QL AUTO: ABNORMAL /HPF
WHOLE BLOOD HOLD SPECIMEN: NORMAL

## 2021-01-01 PROCEDURE — 93010 ELECTROCARDIOGRAM REPORT: CPT | Performed by: INTERNAL MEDICINE

## 2021-01-01 PROCEDURE — 36600 WITHDRAWAL OF ARTERIAL BLOOD: CPT | Performed by: EMERGENCY MEDICINE

## 2021-01-01 PROCEDURE — 93005 ELECTROCARDIOGRAM TRACING: CPT | Performed by: EMERGENCY MEDICINE

## 2021-01-01 PROCEDURE — 85610 PROTHROMBIN TIME: CPT | Performed by: NURSE PRACTITIONER

## 2021-01-01 PROCEDURE — 74176 CT ABD & PELVIS W/O CONTRAST: CPT

## 2021-01-01 PROCEDURE — 80053 COMPREHEN METABOLIC PANEL: CPT | Performed by: INTERNAL MEDICINE

## 2021-01-01 PROCEDURE — 25010000002 METHYLPREDNISOLONE PER 125 MG: Performed by: EMERGENCY MEDICINE

## 2021-01-01 PROCEDURE — 99212 OFFICE O/P EST SF 10 MIN: CPT | Performed by: PHYSICIAN ASSISTANT

## 2021-01-01 PROCEDURE — 85610 PROTHROMBIN TIME: CPT | Performed by: INTERNAL MEDICINE

## 2021-01-01 PROCEDURE — 82375 ASSAY CARBOXYHB QUANT: CPT | Performed by: EMERGENCY MEDICINE

## 2021-01-01 PROCEDURE — 85610 PROTHROMBIN TIME: CPT | Performed by: EMERGENCY MEDICINE

## 2021-01-01 PROCEDURE — 94640 AIRWAY INHALATION TREATMENT: CPT

## 2021-01-01 PROCEDURE — 99283 EMERGENCY DEPT VISIT LOW MDM: CPT

## 2021-01-01 PROCEDURE — 87804 INFLUENZA ASSAY W/OPTIC: CPT | Performed by: EMERGENCY MEDICINE

## 2021-01-01 PROCEDURE — 25010000002 METHYLPREDNISOLONE PER 40 MG: Performed by: INTERNAL MEDICINE

## 2021-01-01 PROCEDURE — 83050 HGB METHEMOGLOBIN QUAN: CPT | Performed by: EMERGENCY MEDICINE

## 2021-01-01 PROCEDURE — 84439 ASSAY OF FREE THYROXINE: CPT | Performed by: EMERGENCY MEDICINE

## 2021-01-01 PROCEDURE — 85025 COMPLETE CBC W/AUTO DIFF WBC: CPT

## 2021-01-01 PROCEDURE — 82805 BLOOD GASES W/O2 SATURATION: CPT | Performed by: EMERGENCY MEDICINE

## 2021-01-01 PROCEDURE — 25010000002 MAGNESIUM SULFATE IN D5W 1G/100ML (PREMIX) 1-5 GM/100ML-% SOLUTION: Performed by: EMERGENCY MEDICINE

## 2021-01-01 PROCEDURE — 85025 COMPLETE CBC W/AUTO DIFF WBC: CPT | Performed by: NURSE PRACTITIONER

## 2021-01-01 PROCEDURE — 83735 ASSAY OF MAGNESIUM: CPT | Performed by: EMERGENCY MEDICINE

## 2021-01-01 PROCEDURE — 80053 COMPREHEN METABOLIC PANEL: CPT | Performed by: EMERGENCY MEDICINE

## 2021-01-01 PROCEDURE — 84484 ASSAY OF TROPONIN QUANT: CPT | Performed by: EMERGENCY MEDICINE

## 2021-01-01 PROCEDURE — 94799 UNLISTED PULMONARY SVC/PX: CPT

## 2021-01-01 PROCEDURE — 87635 SARS-COV-2 COVID-19 AMP PRB: CPT | Performed by: EMERGENCY MEDICINE

## 2021-01-01 PROCEDURE — 93005 ELECTROCARDIOGRAM TRACING: CPT

## 2021-01-01 PROCEDURE — 85025 COMPLETE CBC W/AUTO DIFF WBC: CPT | Performed by: INTERNAL MEDICINE

## 2021-01-01 PROCEDURE — 85025 COMPLETE CBC W/AUTO DIFF WBC: CPT | Performed by: EMERGENCY MEDICINE

## 2021-01-01 PROCEDURE — 36415 COLL VENOUS BLD VENIPUNCTURE: CPT | Performed by: EMERGENCY MEDICINE

## 2021-01-01 PROCEDURE — 63710000001 INSULIN REGULAR HUMAN PER 5 UNITS: Performed by: INTERNAL MEDICINE

## 2021-01-01 PROCEDURE — 93971 EXTREMITY STUDY: CPT | Performed by: SURGERY

## 2021-01-01 PROCEDURE — 82962 GLUCOSE BLOOD TEST: CPT

## 2021-01-01 PROCEDURE — 81001 URINALYSIS AUTO W/SCOPE: CPT | Performed by: EMERGENCY MEDICINE

## 2021-01-01 PROCEDURE — 80048 BASIC METABOLIC PNL TOTAL CA: CPT | Performed by: NURSE PRACTITIONER

## 2021-01-01 PROCEDURE — 71045 X-RAY EXAM CHEST 1 VIEW: CPT

## 2021-01-01 PROCEDURE — 73110 X-RAY EXAM OF WRIST: CPT

## 2021-01-01 PROCEDURE — 99285 EMERGENCY DEPT VISIT HI MDM: CPT

## 2021-01-01 PROCEDURE — 20610 DRAIN/INJ JOINT/BURSA W/O US: CPT | Performed by: PHYSICIAN ASSISTANT

## 2021-01-01 PROCEDURE — 84443 ASSAY THYROID STIM HORMONE: CPT | Performed by: EMERGENCY MEDICINE

## 2021-01-01 PROCEDURE — 70486 CT MAXILLOFACIAL W/O DYE: CPT

## 2021-01-01 PROCEDURE — 83690 ASSAY OF LIPASE: CPT | Performed by: EMERGENCY MEDICINE

## 2021-01-01 PROCEDURE — 70450 CT HEAD/BRAIN W/O DYE: CPT

## 2021-01-01 PROCEDURE — 93971 EXTREMITY STUDY: CPT

## 2021-01-01 PROCEDURE — 72125 CT NECK SPINE W/O DYE: CPT

## 2021-01-01 PROCEDURE — 85730 THROMBOPLASTIN TIME PARTIAL: CPT | Performed by: EMERGENCY MEDICINE

## 2021-01-01 RX ORDER — METHYLPREDNISOLONE SODIUM SUCCINATE 40 MG/ML
40 INJECTION, POWDER, LYOPHILIZED, FOR SOLUTION INTRAMUSCULAR; INTRAVENOUS EVERY 8 HOURS
Status: DISCONTINUED | OUTPATIENT
Start: 2021-01-01 | End: 2021-01-01

## 2021-01-01 RX ORDER — NICOTINE POLACRILEX 4 MG
15 LOZENGE BUCCAL
Status: DISCONTINUED | OUTPATIENT
Start: 2021-01-01 | End: 2021-01-01 | Stop reason: HOSPADM

## 2021-01-01 RX ORDER — TIOTROPIUM BROMIDE INHALATION SPRAY 3.12 UG/1
2 SPRAY, METERED RESPIRATORY (INHALATION)
COMMUNITY
Start: 2021-01-01

## 2021-01-01 RX ORDER — IPRATROPIUM BROMIDE AND ALBUTEROL SULFATE 2.5; .5 MG/3ML; MG/3ML
3 SOLUTION RESPIRATORY (INHALATION)
Status: COMPLETED | OUTPATIENT
Start: 2021-01-01 | End: 2021-01-01

## 2021-01-01 RX ORDER — IPRATROPIUM BROMIDE AND ALBUTEROL SULFATE 2.5; .5 MG/3ML; MG/3ML
SOLUTION RESPIRATORY (INHALATION)
Status: COMPLETED
Start: 2021-01-01 | End: 2021-01-01

## 2021-01-01 RX ORDER — ESCITALOPRAM OXALATE 10 MG/1
15 TABLET ORAL DAILY
COMMUNITY
Start: 2021-01-01

## 2021-01-01 RX ORDER — ALUMINA, MAGNESIA, AND SIMETHICONE 2400; 2400; 240 MG/30ML; MG/30ML; MG/30ML
15 SUSPENSION ORAL EVERY 6 HOURS PRN
Status: DISCONTINUED | OUTPATIENT
Start: 2021-01-01 | End: 2021-01-01 | Stop reason: HOSPADM

## 2021-01-01 RX ORDER — AMOXICILLIN 250 MG
2 CAPSULE ORAL 2 TIMES DAILY
Status: DISCONTINUED | OUTPATIENT
Start: 2021-01-01 | End: 2021-01-01 | Stop reason: HOSPADM

## 2021-01-01 RX ORDER — LEVOCETIRIZINE DIHYDROCHLORIDE 5 MG/1
5 TABLET, FILM COATED ORAL EVERY EVENING
COMMUNITY
Start: 2021-01-01

## 2021-01-01 RX ORDER — CEPHALEXIN 250 MG/1
500 CAPSULE ORAL ONCE
Status: COMPLETED | OUTPATIENT
Start: 2021-01-01 | End: 2021-01-01

## 2021-01-01 RX ORDER — FOLIC ACID 1 MG/1
TABLET ORAL
COMMUNITY
End: 2021-01-01

## 2021-01-01 RX ORDER — MAGNESIUM SULFATE 1 G/100ML
1 INJECTION INTRAVENOUS
Status: COMPLETED | OUTPATIENT
Start: 2021-01-01 | End: 2021-01-01

## 2021-01-01 RX ORDER — METHOTREXATE 2.5 MG/1
15 TABLET ORAL WEEKLY
COMMUNITY
Start: 2021-01-01

## 2021-01-01 RX ORDER — METHYLPREDNISOLONE SODIUM SUCCINATE 40 MG/ML
20 INJECTION, POWDER, LYOPHILIZED, FOR SOLUTION INTRAMUSCULAR; INTRAVENOUS EVERY 8 HOURS
Status: DISCONTINUED | OUTPATIENT
Start: 2021-01-01 | End: 2021-01-01

## 2021-01-01 RX ORDER — FERROUS SULFATE 325(65) MG
325 TABLET ORAL
COMMUNITY

## 2021-01-01 RX ORDER — BISACODYL 10 MG
10 SUPPOSITORY, RECTAL RECTAL DAILY PRN
Status: DISCONTINUED | OUTPATIENT
Start: 2021-01-01 | End: 2021-01-01 | Stop reason: HOSPADM

## 2021-01-01 RX ORDER — SODIUM CHLORIDE 0.9 % (FLUSH) 0.9 %
10 SYRINGE (ML) INJECTION AS NEEDED
Status: DISCONTINUED | OUTPATIENT
Start: 2021-01-01 | End: 2021-01-01 | Stop reason: HOSPADM

## 2021-01-01 RX ORDER — PREDNISONE 20 MG/1
20 TABLET ORAL DAILY
Qty: 5 TABLET | Refills: 0 | Status: SHIPPED | OUTPATIENT
Start: 2021-01-01 | End: 2021-01-01

## 2021-01-01 RX ORDER — NYSTATIN 100000 U/G
1 CREAM TOPICAL 2 TIMES DAILY PRN
COMMUNITY
Start: 2021-01-01

## 2021-01-01 RX ORDER — CEPHALEXIN 500 MG/1
500 CAPSULE ORAL 3 TIMES DAILY
Qty: 21 CAPSULE | Refills: 0 | Status: SHIPPED | OUTPATIENT
Start: 2021-01-01 | End: 2021-01-01

## 2021-01-01 RX ORDER — LAMOTRIGINE 100 MG/1
100 TABLET ORAL 2 TIMES DAILY
COMMUNITY
Start: 2021-01-01

## 2021-01-01 RX ORDER — NALOXONE HCL 0.4 MG/ML
0.4 VIAL (ML) INJECTION
Status: DISCONTINUED | OUTPATIENT
Start: 2021-01-01 | End: 2021-01-01 | Stop reason: HOSPADM

## 2021-01-01 RX ORDER — DOCUSATE SODIUM 100 MG/1
100 CAPSULE, LIQUID FILLED ORAL 2 TIMES DAILY
COMMUNITY

## 2021-01-01 RX ORDER — CRISABOROLE 20 MG/G
OINTMENT TOPICAL
COMMUNITY
Start: 2021-04-02 | End: 2021-01-01

## 2021-01-01 RX ORDER — WARFARIN SODIUM 6 MG/1
6 TABLET ORAL
COMMUNITY
Start: 2021-01-01

## 2021-01-01 RX ORDER — DICYCLOMINE HCL 20 MG
20 TABLET ORAL EVERY 6 HOURS
Qty: 15 TABLET | Refills: 0 | Status: SHIPPED | OUTPATIENT
Start: 2021-01-01

## 2021-01-01 RX ORDER — NEOMYCIN SULFATE, POLYMYXIN B SULFATE, BACITRACIN ZINC, HYDROCORTISONE 3.5; 10000; 400; 1 MG/G; [USP'U]/G; [USP'U]/G; MG/G
OINTMENT OPHTHALMIC
COMMUNITY
End: 2021-01-01

## 2021-01-01 RX ORDER — FAMOTIDINE 20 MG/1
40 TABLET, FILM COATED ORAL DAILY
Status: DISCONTINUED | OUTPATIENT
Start: 2021-01-01 | End: 2021-01-01 | Stop reason: HOSPADM

## 2021-01-01 RX ORDER — ACETAMINOPHEN 325 MG/1
650 TABLET ORAL EVERY 4 HOURS PRN
Status: DISCONTINUED | OUTPATIENT
Start: 2021-01-01 | End: 2021-01-01 | Stop reason: HOSPADM

## 2021-01-01 RX ORDER — HYDROCODONE BITARTRATE AND ACETAMINOPHEN 5; 325 MG/1; MG/1
1 TABLET ORAL EVERY 4 HOURS PRN
Status: DISCONTINUED | OUTPATIENT
Start: 2021-01-01 | End: 2021-01-01 | Stop reason: HOSPADM

## 2021-01-01 RX ORDER — HYDROCODONE BITARTRATE AND ACETAMINOPHEN 5; 325 MG/1; MG/1
TABLET ORAL
COMMUNITY
Start: 2021-04-15 | End: 2021-01-01

## 2021-01-01 RX ORDER — ASPIRIN 81 MG/1
TABLET ORAL
COMMUNITY
End: 2021-01-01

## 2021-01-01 RX ORDER — DEXTROSE MONOHYDRATE 100 MG/ML
25 INJECTION, SOLUTION INTRAVENOUS
Status: DISCONTINUED | OUTPATIENT
Start: 2021-01-01 | End: 2021-01-01 | Stop reason: HOSPADM

## 2021-01-01 RX ORDER — METHYLPREDNISOLONE ACETATE 80 MG/ML
80 INJECTION, SUSPENSION INTRA-ARTICULAR; INTRALESIONAL; INTRAMUSCULAR; SOFT TISSUE
Status: COMPLETED | OUTPATIENT
Start: 2021-01-01 | End: 2021-01-01

## 2021-01-01 RX ORDER — LEVETIRACETAM 500 MG/1
500 TABLET ORAL 2 TIMES DAILY
COMMUNITY
Start: 2021-01-01

## 2021-01-01 RX ORDER — CARBIDOPA, LEVODOPA AND ENTACAPONE 25; 200; 100 MG/1; MG/1; MG/1
TABLET, FILM COATED ORAL
COMMUNITY
End: 2021-01-01

## 2021-01-01 RX ORDER — LIDOCAINE HYDROCHLORIDE 10 MG/ML
9 INJECTION, SOLUTION INFILTRATION; PERINEURAL
Status: COMPLETED | OUTPATIENT
Start: 2021-01-01 | End: 2021-01-01

## 2021-01-01 RX ORDER — HUMAN INSULIN 100 [USP'U]/ML
INJECTION, SUSPENSION SUBCUTANEOUS
COMMUNITY
End: 2021-01-01

## 2021-01-01 RX ORDER — ONDANSETRON 2 MG/ML
4 INJECTION INTRAMUSCULAR; INTRAVENOUS EVERY 4 HOURS PRN
Status: DISCONTINUED | OUTPATIENT
Start: 2021-01-01 | End: 2021-01-01 | Stop reason: HOSPADM

## 2021-01-01 RX ORDER — LOSARTAN POTASSIUM 25 MG/1
25 TABLET ORAL DAILY
Status: DISCONTINUED | OUTPATIENT
Start: 2021-01-01 | End: 2021-01-01 | Stop reason: HOSPADM

## 2021-01-01 RX ORDER — IPRATROPIUM BROMIDE AND ALBUTEROL SULFATE 2.5; .5 MG/3ML; MG/3ML
3 SOLUTION RESPIRATORY (INHALATION)
Status: DISCONTINUED | OUTPATIENT
Start: 2021-01-01 | End: 2021-01-01 | Stop reason: HOSPADM

## 2021-01-01 RX ORDER — ALPRAZOLAM 0.25 MG/1
0.25 TABLET ORAL EVERY 6 HOURS PRN
Status: DISCONTINUED | OUTPATIENT
Start: 2021-01-01 | End: 2021-01-01 | Stop reason: HOSPADM

## 2021-01-01 RX ORDER — ARIPIPRAZOLE 5 MG/1
TABLET ORAL
COMMUNITY
Start: 2021-01-01 | End: 2021-01-01

## 2021-01-01 RX ORDER — WARFARIN SODIUM 1 MG/1
1 TABLET ORAL TAKE AS DIRECTED
COMMUNITY
Start: 2021-01-01 | End: 2021-01-01 | Stop reason: HOSPADM

## 2021-01-01 RX ORDER — POLYETHYLENE GLYCOL 3350 17 G/17G
17 POWDER, FOR SOLUTION ORAL DAILY PRN
Status: DISCONTINUED | OUTPATIENT
Start: 2021-01-01 | End: 2021-01-01 | Stop reason: HOSPADM

## 2021-01-01 RX ORDER — NYSTATIN 100000 [USP'U]/G
1 POWDER TOPICAL 2 TIMES DAILY
COMMUNITY
Start: 2021-01-01

## 2021-01-01 RX ORDER — ACETAMINOPHEN 325 MG/1
650 TABLET ORAL ONCE
Status: COMPLETED | OUTPATIENT
Start: 2021-01-01 | End: 2021-01-01

## 2021-01-01 RX ORDER — TEMAZEPAM 15 MG/1
15 CAPSULE ORAL NIGHTLY PRN
Status: DISCONTINUED | OUTPATIENT
Start: 2021-01-01 | End: 2021-01-01 | Stop reason: HOSPADM

## 2021-01-01 RX ORDER — METHYLPREDNISOLONE SODIUM SUCCINATE 125 MG/2ML
125 INJECTION, POWDER, LYOPHILIZED, FOR SOLUTION INTRAMUSCULAR; INTRAVENOUS ONCE
Status: COMPLETED | OUTPATIENT
Start: 2021-01-01 | End: 2021-01-01

## 2021-01-01 RX ORDER — SODIUM CHLORIDE 450 MG/100ML
75 INJECTION, SOLUTION INTRAVENOUS CONTINUOUS
Status: DISCONTINUED | OUTPATIENT
Start: 2021-01-01 | End: 2021-01-01 | Stop reason: HOSPADM

## 2021-01-01 RX ORDER — LEVETIRACETAM 500 MG/1
500 TABLET ORAL 2 TIMES DAILY
Status: DISCONTINUED | OUTPATIENT
Start: 2021-01-01 | End: 2021-01-01 | Stop reason: HOSPADM

## 2021-01-01 RX ORDER — LACTULOSE 10 G/10G
SOLUTION ORAL
COMMUNITY
End: 2021-01-01

## 2021-01-01 RX ORDER — LOSARTAN POTASSIUM 25 MG/1
25 TABLET ORAL 2 TIMES DAILY
COMMUNITY
Start: 2021-04-18

## 2021-01-01 RX ORDER — DIPHENOXYLATE HYDROCHLORIDE AND ATROPINE SULFATE 2.5; .025 MG/1; MG/1
1 TABLET ORAL 4 TIMES DAILY PRN
Qty: 15 TABLET | Refills: 0 | Status: SHIPPED | OUTPATIENT
Start: 2021-01-01

## 2021-01-01 RX ORDER — ALBUTEROL SULFATE 90 UG/1
AEROSOL, METERED RESPIRATORY (INHALATION)
COMMUNITY
End: 2021-01-01

## 2021-01-01 RX ORDER — SODIUM CHLORIDE 0.9 % (FLUSH) 0.9 %
10 SYRINGE (ML) INJECTION EVERY 12 HOURS SCHEDULED
Status: DISCONTINUED | OUTPATIENT
Start: 2021-01-01 | End: 2021-01-01 | Stop reason: HOSPADM

## 2021-01-01 RX ORDER — INSULIN GLARGINE 100 [IU]/ML
INJECTION, SOLUTION SUBCUTANEOUS
COMMUNITY
Start: 2021-01-01 | End: 2021-01-01

## 2021-01-01 RX ORDER — SIMVASTATIN 20 MG
20 TABLET ORAL DAILY
COMMUNITY
Start: 2021-01-01

## 2021-01-01 RX ORDER — LAMOTRIGINE 100 MG/1
100 TABLET ORAL 2 TIMES DAILY
Status: DISCONTINUED | OUTPATIENT
Start: 2021-01-01 | End: 2021-01-01 | Stop reason: HOSPADM

## 2021-01-01 RX ORDER — BUDESONIDE AND FORMOTEROL FUMARATE DIHYDRATE 160; 4.5 UG/1; UG/1
AEROSOL RESPIRATORY (INHALATION)
COMMUNITY
Start: 2021-01-01 | End: 2021-01-01

## 2021-01-01 RX ORDER — PANTOPRAZOLE SODIUM 20 MG/1
20 TABLET, DELAYED RELEASE ORAL DAILY
COMMUNITY

## 2021-01-01 RX ORDER — GUAIFENESIN/DEXTROMETHORPHAN 100-10MG/5
5 SYRUP ORAL EVERY 4 HOURS PRN
Status: DISCONTINUED | OUTPATIENT
Start: 2021-01-01 | End: 2021-01-01 | Stop reason: HOSPADM

## 2021-01-01 RX ORDER — BISACODYL 5 MG/1
5 TABLET, DELAYED RELEASE ORAL DAILY PRN
Status: DISCONTINUED | OUTPATIENT
Start: 2021-01-01 | End: 2021-01-01 | Stop reason: HOSPADM

## 2021-01-01 RX ORDER — WARFARIN SODIUM 5 MG/1
TABLET ORAL
COMMUNITY
Start: 2021-04-19 | End: 2021-01-01

## 2021-01-01 RX ADMIN — METHYLPREDNISOLONE ACETATE 80 MG: 80 INJECTION, SUSPENSION INTRA-ARTICULAR; INTRALESIONAL; INTRAMUSCULAR; SOFT TISSUE at 15:58

## 2021-01-01 RX ADMIN — METHYLPREDNISOLONE SODIUM SUCCINATE 20 MG: 40 INJECTION, POWDER, FOR SOLUTION INTRAMUSCULAR; INTRAVENOUS at 06:05

## 2021-01-01 RX ADMIN — CARBIDOPA AND LEVODOPA 1 TABLET: 25; 100 TABLET ORAL at 16:37

## 2021-01-01 RX ADMIN — LOSARTAN POTASSIUM 25 MG: 25 TABLET, FILM COATED ORAL at 10:05

## 2021-01-01 RX ADMIN — LAMOTRIGINE 100 MG: 100 TABLET ORAL at 21:01

## 2021-01-01 RX ADMIN — SODIUM CHLORIDE, PRESERVATIVE FREE 10 ML: 5 INJECTION INTRAVENOUS at 10:56

## 2021-01-01 RX ADMIN — INSULIN HUMAN 8 UNITS: 100 INJECTION, SOLUTION PARENTERAL at 17:11

## 2021-01-01 RX ADMIN — ACETAMINOPHEN 650 MG: 325 TABLET ORAL at 21:19

## 2021-01-01 RX ADMIN — LAMOTRIGINE 100 MG: 100 TABLET ORAL at 20:47

## 2021-01-01 RX ADMIN — METHYLPREDNISOLONE SODIUM SUCCINATE 20 MG: 40 INJECTION, POWDER, FOR SOLUTION INTRAMUSCULAR; INTRAVENOUS at 21:50

## 2021-01-01 RX ADMIN — LEVETIRACETAM 500 MG: 500 TABLET ORAL at 21:01

## 2021-01-01 RX ADMIN — METHYLPREDNISOLONE SODIUM SUCCINATE 125 MG: 125 INJECTION, POWDER, FOR SOLUTION INTRAMUSCULAR; INTRAVENOUS at 05:46

## 2021-01-01 RX ADMIN — IPRATROPIUM BROMIDE AND ALBUTEROL SULFATE 3 ML: .5; 2.5 SOLUTION RESPIRATORY (INHALATION) at 04:00

## 2021-01-01 RX ADMIN — IPRATROPIUM BROMIDE AND ALBUTEROL SULFATE 3 ML: .5; 2.5 SOLUTION RESPIRATORY (INHALATION) at 00:47

## 2021-01-01 RX ADMIN — DOCUSATE SODIUM 50MG AND SENNOSIDES 8.6MG 2 TABLET: 8.6; 5 TABLET, FILM COATED ORAL at 09:17

## 2021-01-01 RX ADMIN — METOPROLOL TARTRATE 25 MG: 25 TABLET, FILM COATED ORAL at 09:17

## 2021-01-01 RX ADMIN — FAMOTIDINE 40 MG: 20 TABLET ORAL at 09:17

## 2021-01-01 RX ADMIN — LEVETIRACETAM 500 MG: 500 TABLET ORAL at 10:01

## 2021-01-01 RX ADMIN — IPRATROPIUM BROMIDE AND ALBUTEROL SULFATE 3 ML: .5; 2.5 SOLUTION RESPIRATORY (INHALATION) at 19:11

## 2021-01-01 RX ADMIN — LIDOCAINE HYDROCHLORIDE 9 ML: 10 INJECTION, SOLUTION INFILTRATION; PERINEURAL at 15:58

## 2021-01-01 RX ADMIN — INSULIN HUMAN 6 UNITS: 100 INJECTION, SOLUTION PARENTERAL at 09:17

## 2021-01-01 RX ADMIN — MAGNESIUM SULFATE 1 G: 1 INJECTION INTRAVENOUS at 03:16

## 2021-01-01 RX ADMIN — SODIUM CHLORIDE 1000 ML: 9 INJECTION, SOLUTION INTRAVENOUS at 01:00

## 2021-01-01 RX ADMIN — METOPROLOL TARTRATE 25 MG: 25 TABLET, FILM COATED ORAL at 20:47

## 2021-01-01 RX ADMIN — SODIUM CHLORIDE 75 ML/HR: 4.5 INJECTION, SOLUTION INTRAVENOUS at 20:53

## 2021-01-01 RX ADMIN — INSULIN HUMAN 4 UNITS: 100 INJECTION, SOLUTION PARENTERAL at 10:00

## 2021-01-01 RX ADMIN — SODIUM CHLORIDE 75 ML/HR: 4.5 INJECTION, SOLUTION INTRAVENOUS at 10:00

## 2021-01-01 RX ADMIN — LOSARTAN POTASSIUM 25 MG: 25 TABLET, FILM COATED ORAL at 09:17

## 2021-01-01 RX ADMIN — METHYLPREDNISOLONE SODIUM SUCCINATE 20 MG: 40 INJECTION, POWDER, FOR SOLUTION INTRAMUSCULAR; INTRAVENOUS at 06:21

## 2021-01-01 RX ADMIN — IPRATROPIUM BROMIDE AND ALBUTEROL SULFATE 3 ML: .5; 2.5 SOLUTION RESPIRATORY (INHALATION) at 00:20

## 2021-01-01 RX ADMIN — SODIUM CHLORIDE, PRESERVATIVE FREE 10 ML: 5 INJECTION INTRAVENOUS at 09:18

## 2021-01-01 RX ADMIN — IPRATROPIUM BROMIDE AND ALBUTEROL SULFATE 3 ML: .5; 2.5 SOLUTION RESPIRATORY (INHALATION) at 12:18

## 2021-01-01 RX ADMIN — METOPROLOL TARTRATE 25 MG: 25 TABLET, FILM COATED ORAL at 10:00

## 2021-01-01 RX ADMIN — SODIUM CHLORIDE 75 ML/HR: 4.5 INJECTION, SOLUTION INTRAVENOUS at 06:53

## 2021-01-01 RX ADMIN — CARBIDOPA AND LEVODOPA 1 TABLET: 25; 100 TABLET ORAL at 10:02

## 2021-01-01 RX ADMIN — FAMOTIDINE 40 MG: 20 TABLET ORAL at 10:01

## 2021-01-01 RX ADMIN — METHYLPREDNISOLONE SODIUM SUCCINATE 20 MG: 40 INJECTION, POWDER, FOR SOLUTION INTRAMUSCULAR; INTRAVENOUS at 21:25

## 2021-01-01 RX ADMIN — CARBIDOPA AND LEVODOPA 1 TABLET: 25; 100 TABLET ORAL at 09:17

## 2021-01-01 RX ADMIN — LAMOTRIGINE 100 MG: 100 TABLET ORAL at 10:00

## 2021-01-01 RX ADMIN — LEVETIRACETAM 500 MG: 500 TABLET ORAL at 09:17

## 2021-01-01 RX ADMIN — LAMOTRIGINE 100 MG: 100 TABLET ORAL at 09:17

## 2021-01-01 RX ADMIN — METHYLPREDNISOLONE SODIUM SUCCINATE 20 MG: 40 INJECTION, POWDER, FOR SOLUTION INTRAMUSCULAR; INTRAVENOUS at 16:37

## 2021-01-01 RX ADMIN — INSULIN HUMAN 6 UNITS: 100 INJECTION, SOLUTION PARENTERAL at 12:52

## 2021-01-01 RX ADMIN — IPRATROPIUM BROMIDE AND ALBUTEROL SULFATE 3 ML: .5; 2.5 SOLUTION RESPIRATORY (INHALATION) at 03:59

## 2021-01-01 RX ADMIN — MAGNESIUM SULFATE 1 G: 1 INJECTION INTRAVENOUS at 04:14

## 2021-01-01 RX ADMIN — LOSARTAN POTASSIUM 25 MG: 25 TABLET, FILM COATED ORAL at 20:47

## 2021-01-01 RX ADMIN — ACETAMINOPHEN 650 MG: 325 TABLET ORAL at 22:59

## 2021-01-01 RX ADMIN — IPRATROPIUM BROMIDE AND ALBUTEROL SULFATE 3 ML: .5; 2.5 SOLUTION RESPIRATORY (INHALATION) at 03:58

## 2021-01-01 RX ADMIN — ACETAMINOPHEN 650 MG: 325 TABLET ORAL at 21:50

## 2021-01-01 RX ADMIN — IPRATROPIUM BROMIDE AND ALBUTEROL SULFATE 3 ML: .5; 2.5 SOLUTION RESPIRATORY (INHALATION) at 18:41

## 2021-01-01 RX ADMIN — CARBIDOPA AND LEVODOPA 1 TABLET: 25; 100 TABLET ORAL at 21:01

## 2021-01-01 RX ADMIN — LEVETIRACETAM 500 MG: 500 TABLET ORAL at 20:46

## 2021-01-01 RX ADMIN — CEPHALEXIN 500 MG: 250 CAPSULE ORAL at 23:40

## 2021-01-01 RX ADMIN — METHYLPREDNISOLONE SODIUM SUCCINATE 40 MG: 40 INJECTION, POWDER, FOR SOLUTION INTRAMUSCULAR; INTRAVENOUS at 14:30

## 2021-01-01 RX ADMIN — IPRATROPIUM BROMIDE AND ALBUTEROL SULFATE 3 ML: .5; 2.5 SOLUTION RESPIRATORY (INHALATION) at 14:42

## 2021-01-01 RX ADMIN — IPRATROPIUM BROMIDE AND ALBUTEROL SULFATE 3 ML: .5; 2.5 SOLUTION RESPIRATORY (INHALATION) at 06:38

## 2021-01-01 RX ADMIN — FAMOTIDINE 40 MG: 20 TABLET ORAL at 10:57

## 2021-01-01 RX ADMIN — CARBIDOPA AND LEVODOPA 1 TABLET: 25; 100 TABLET ORAL at 20:46

## 2021-01-01 RX ADMIN — IPRATROPIUM BROMIDE AND ALBUTEROL SULFATE 3 ML: .5; 2.5 SOLUTION RESPIRATORY (INHALATION) at 06:06

## 2021-01-01 RX ADMIN — IPRATROPIUM BROMIDE AND ALBUTEROL SULFATE 3 ML: .5; 2.5 SOLUTION RESPIRATORY (INHALATION) at 06:21

## 2021-01-19 ENCOUNTER — OFFICE VISIT CONVERTED (OUTPATIENT)
Dept: ORTHOPEDIC SURGERY | Facility: CLINIC | Age: 66
End: 2021-01-19
Attending: ORTHOPAEDIC SURGERY

## 2021-01-27 ENCOUNTER — TELEPHONE CONVERTED (OUTPATIENT)
Dept: GASTROENTEROLOGY | Facility: CLINIC | Age: 66
End: 2021-01-27
Attending: NURSE PRACTITIONER

## 2021-02-08 ENCOUNTER — HOSPITAL ENCOUNTER (OUTPATIENT)
Dept: GASTROENTEROLOGY | Facility: HOSPITAL | Age: 66
Setting detail: HOSPITAL OUTPATIENT SURGERY
Discharge: HOME | End: 2021-02-08
Attending: INTERNAL MEDICINE

## 2021-02-08 LAB — GLUCOSE BLD-MCNC: 124 MG/DL (ref 65–99)

## 2021-02-09 ENCOUNTER — OFFICE VISIT CONVERTED (OUTPATIENT)
Dept: PULMONOLOGY | Facility: CLINIC | Age: 66
End: 2021-02-09
Attending: INTERNAL MEDICINE

## 2021-02-25 ENCOUNTER — OFFICE VISIT CONVERTED (OUTPATIENT)
Dept: ORTHOPEDIC SURGERY | Facility: CLINIC | Age: 66
End: 2021-02-25
Attending: PHYSICIAN ASSISTANT

## 2021-03-10 ENCOUNTER — HOSPITAL ENCOUNTER (OUTPATIENT)
Dept: CT IMAGING | Facility: HOSPITAL | Age: 66
Discharge: HOME | End: 2021-03-10
Attending: INTERNAL MEDICINE

## 2021-03-22 ENCOUNTER — OFFICE VISIT CONVERTED (OUTPATIENT)
Dept: ORTHOPEDIC SURGERY | Facility: CLINIC | Age: 66
End: 2021-03-22
Attending: PHYSICIAN ASSISTANT

## 2021-04-07 ENCOUNTER — HOSPITAL ENCOUNTER (OUTPATIENT)
Dept: CT IMAGING | Facility: HOSPITAL | Age: 66
Discharge: HOME | End: 2021-04-07
Attending: INTERNAL MEDICINE

## 2021-05-10 NOTE — H&P
History and Physical      Patient Name: Elysia White   Patient ID: [id number]   Sex: Female   YOB: 1955    Primary Care Provider: David Donaldson MD   Referring Provider: David Donaldson MD    Visit Date: January 27, 2021    Provider: SHAHZAD Singleton   Location: [facility]   Location Address: [address] [place] [address]  [id number]   Location Phone: [phone]          Chief Complaint  · + occult blood       History Of Present Illness  TELEHEALTH TELEPHONE VISIT  Elysia White is a 65 year old /White female who is presenting for evaluation via telehealth telephone visit. Verbal consent obtained before beginning visit.   Provider spent 30 minutes with the patient during the telehealth visit.   The following staff were present during this visit: Aj Claudio MA, Julieta SALGUERO   Past Medical History/ Overview of Patient Symptoms     New pt w c/o blood in stool, states sees about once a month. Stool OB + 12/3/20. No constipation or diarrhea, but does admit some straining at times, and she feels blood is r/t straining. Occasional abd pain, no abd pain w meals. Pt in NH d/t issues w mobility for a little over 1 year. Pt doesn't think she sees cardio but she does have cardio hx. Pt states she has lost weight 70# over the last year, pt states she has been trying to lose w eating healthier options. Current weight about 170#. Good appetite, no N/V. Pt does have HB.   1/18/21: Hgb 8.1  (was 7.4 in 1/4/21)  Pt has lactulose on list, nurse states they give if no BM in 3 days.   Nurse states pt is confused. I spoke with both the nurse and the patient today, it took quite a bit of time for nurse to go through records. Nurse denies pt sees cardiology, she does follow with pulmonary --Dr Blue's office for COPD.          Past Medical History  Anemia, Unspecified; Arthralgia of right hand; Arthritis; Bladder Disorder; Blood Diseases; Cancer; Chest pain;  Chronic Obstructive Pulmonary Disease; Congestive heart failure; COPD; Diabetes; Hearing loss; Heart Attack; Hyperlipemia; Kidney Disease; Limb Swelling; Obstructive Sleep Apnea; Otitis Media; Parkinson's Disease; Rectal bleeding; Reflux; Right Carpal Tunnel Syndrome; Right thumb CMC osteoarthritis; Right wrist pain; Seasonal allergies; Seizure; Shortness of Breath; Stroke; Thyroid disorder         Past Surgical History  heart surgery; Heart Valves         Medication List  Name Date Started Instructions   acetaminophen 325 mg oral capsule  take 1 capsule by oral route daily as needed   aripiprazole 5 mg oral tablet  take 1 tablet (5 mg) by oral route once daily   Aspir-81 81 mg oral tablet,delayed release (DR/EC)  take 1 tablet (81 mg) by oral route once daily   B-Complex With B-12 2.5 mg-2.5 mg- 5 mg-100 mcg oral tablet  take 1 tablet by oral route daily   Calcium 600 600 mg calcium (1,500 mg) oral tablet  take 1 tablet by oral route daily   Debrox 6.5 % otic (ear) drops  instill 10 drops into right ear by otic route 2 times per day   escitalopram oxalate 10 mg oral tablet  take 1 tablet (10 mg) by oral route once daily   folic acid 1 mg oral tablet  take 1 tablet (1 mg) by oral route once daily   Humalog U-100 Insulin 100 unit/mL subcutaneous solution  inject by subcutaneous route per prescriber's instructions. Insulin dosing requires individualization.   lactulose 10 gram oral packet  take 1 packet (10 gram) dissolved in 4 ounces of water by oral route once daily   Lamictal 100 mg oral tablet  take 1 tablet (100 mg) by oral route once daily   Lantus U-100 Insulin 100 unit/mL subcutaneous solution  inject by subcutaneous route as per insulin protocol   levetiracetam 500 mg oral tablet  take 1 tablet (500 mg) by oral route 2 times per day   levocetirizine 5 mg oral tablet  take 1 tablet (5 mg) by oral route once daily in the evening   losartan 25 mg oral tablet  take 1 tablet (25 mg) by oral route once daily    Magnesium (oxide/AA chelate) 300 mg oral capsule  take 1 capsule by oral route daily   methotrexate sodium 2.5 mg oral tablet  take 1 tablet (2.5 mg) by oral route once weekly   metoprolol tartrate 25 mg oral tablet  take 1 tablet (25 mg) by oral route once daily   neomycin-bacitracin-poly-HC 3.5-400-10,000 mg-unit/g-1% ophthalmic (eye) ointment  apply a small amount in the conjunctival sac in the left eye by ophthalmic route every 4 hours   Norco 7.5-325 mg oral tablet 12/29/2020 take 1 tablet by oral route every 6 hours as needed   Novolin 70/30 U-100 Insulin 100 unit/mL (70-30) subcutaneous suspension  inject by subcutaneous route as per insulin protocol   polymyxin B sulf-trimethoprim 10,000 unit- 1 mg/mL ophthalmic (eye) drops  instill 1 drop into affected eye(s) by ophthalmic route every 6 hours   prochlorperazine maleate 5 mg oral tablet  take 1 tablet (5 mg) by oral route 3 times per day   Protonix 40 mg oral tablet,delayed release (DR/EC)  take 1 tablet (40 mg) by oral route once daily   Robitussin Cough and Cold CF 2.5-5-50 mg/5 mL oral liquid  take 10 milliliters by oral route As needed   Sinemet  mg oral tablet  take 1 tablet by oral route 3 times per day   Spiriva Respimat 2.5 mcg/actuation inhalation mist  inhale 2 puffs (5 mcg) by inhalation route once daily at the same time each day   Symbicort 160-4.5 mcg/actuation inhalation HFA aerosol inhaler  inhale 2 puffs by inhalation route 2 times per day in the morning and evening   Ventolin HFA 90 mcg/actuation inhalation HFA aerosol inhaler  inhale 1 puff (90 mcg) by inhalation route every 6 hours as needed   Visine Tears 1-0.2-0.2 % ophthalmic (eye) drops  instill 2 drops in affected eye daily   Voltaren 1 % topical gel  apply 2 grams to the affected area(s) by topical route 4 times per day   warfarin 5 mg oral tablet  take 1 tablet (5 mg) by oral route once daily         Allergy List  azithromycin; clarithromycin; I.V. Dye; loratadine; MACROLIDE  ANTIBIOTICS; PENICILLINS       Allergies Reconciled  Family Medical History  Disease Name Relative/Age Notes   Cancer, Unspecified Daughter/  Father/  Sister/   Father; Sister; Daughter  Father; Daughter   Diabetes, unspecified type Brother/  Mother/  Sister/   Mother; Sister; Brother   Family history of certain chronic disabling diseases; arthritis Mother/   Mother   No family history of colorectal cancer  --    Family history of Arthritis Brother/  Daughter/  Sister/   Sister; Brother; Daughter  Daughter   Family history of cancer Father/  Sister/   --    Family history of diabetes mellitus Brother/  Mother/  Sister/   --          Social History  Alcohol Use (Never); .; lives in a nursing home/[facility] home; Recreational Drug Use (Never); Tobacco (Former); Unemployed.             Assessment  · Blood in stool     578.1/K92.1  · Abdominal pain     789.00/R10.9  · Weight loss     783.21/R63.4  ? unintentional  · Constipation     564.00/K59.00  · Anemia     285.9/D64.9      Plan  · Orders  o [facility] Pre-Op Covid-19 Screening (82929) - - 01/27/2021  · Medications  o MoviPrep 100-7.5-2.691 gram oral powder in packet   SIG: take by oral route as directed per office instructions   DISP: (1) Packet with 0 refills  Prescribed on 01/27/2021     o Colace 100 mg oral capsule   SIG: take 1 capsule (100 mg) by oral route 2 times per day for 30 days   DISP: (60) Capsule with 3 refills  Prescribed on 01/27/2021     · Instructions  o Plan Of Care:   o Patient instructed to seek medical attention urgently for new or worsening symptoms.  o Call the office with any concerns or questions.  o EGD/COLONOSCOPY r/b d/w pt, anemia, wt loss, blood in stool. Anesthesia per IV protocol. Coumadin per DR Donaldson; Dr Blue pulmonary clearance  o We will fax orders/instructions to nursing home            Electronically Signed by: SHAHZAD Singleton -Author on January 27, 2021 03:03:28 PM

## 2021-05-10 NOTE — H&P
History and Physical      Patient Name: Elysia White   Patient ID: [id number]   Sex: Female   YOB: 1955    Primary Care Provider: Angelika WELLER   Referring Provider: AUGUSTIN MONTE    Visit Date: December 29, 2020    Provider: Mic Hart MD   Location: [facility]   Location Address: [address] [place] [address]  [id number]   Location Phone: [phone]          Chief Complaint  · Right shoulder injury  · Right wrist pain      History Of Present Illness  Elysia White is a 65 year old /White female who presents today to [facility]. Patient presents with her daughter for evaluation of right shoulder and right wrist pain. Patient states on 12/24/20 she was stepping off her porch and misstepped and fell and landed on her right shoulder and wrist. Patient states the pain is in the lateral and anterior shoulder, patient states the pain in her wrist is in the dorsum of the wrist, pain with range of motion. Patient presents in a wheelchair, patient daughter states her mother is usually in a wheelchair. Patient is a resident of [facility] in [place]. Patient daughter states that patient has a history of difficulty coming out of anesthesia,they would like to avoid surgery if possible.       Past Medical History  Arthralgia of right hand; Arthritis; Bladder Disorder; Cancer; Chest pain; Chronic Obstructive Pulmonary Disease; Congestive heart failure; COPD; Diabetes; Hearing loss; Heart Attack; Hyperlipemia; Limb Swelling; Obstructive Sleep Apnea; Otitis Media; Reflux; Right Carpal Tunnel Syndrome; Right thumb CMC osteoarthritis; Right wrist pain; Seasonal allergies; Seizure; Shortness of Breath; Stroke; Thyroid disorder         Past Surgical History  Heart Valves         Medication List  Abilify 5 mg oral tablet; acetaminophen 325 mg oral capsule; Aspir-81 81 mg oral tablet,delayed release (DR/EC); B-Complex With B-12 2.5 mg-2.5 mg- 5 mg-100 mcg  "oral tablet; Calcium 600 600 mg calcium (1,500 mg) oral tablet; carbidopa-levodopa-entacapone -200 mg oral tablet; cranberry 400 mg oral capsule; escitalopram oxalate 10 mg oral tablet; folic acid 1 mg oral tablet; lactulose 10 gram oral packet; Lamictal 100 mg oral tablet; Lantus U-100 Insulin 100 unit/mL subcutaneous solution; levocetirizine 5 mg oral tablet; losartan 25 mg oral tablet; methotrexate sodium 2.5 mg oral tablet; metoprolol tartrate 25 mg oral tablet; Novolin 70/30 U-100 Insulin 100 unit/mL (70-30) subcutaneous suspension; Protonix 40 mg oral tablet,delayed release (DR/EC); Spiriva Respimat 2.5 mcg/actuation inhalation mist; triamcinolone acetonide 0.1 % topical cream; Ventolin HFA 90 mcg/actuation inhalation HFA aerosol inhaler         Allergy List  azithromycin; clarithromycin; I.V. Dye; loratadine; MACROLIDE ANTIBIOTICS; PENICILLINS       Allergies Reconciled  Family Medical History  Family history of certain chronic disabling diseases; arthritis; Family history of cancer; Family history of diabetes mellitus         Social History  Tobacco (Former)         Review of Systems  · Constitutional  o Denies  o : fever, chills, weight loss  · Cardiovascular  o Denies  o : chest pain, shortness of breath  · Gastrointestinal  o Denies  o : liver disease, heartburn, nausea, blood in stools  · Genitourinary  o Denies  o : painful urination, blood in urine  · Integument  o Denies  o : rash, itching  · Neurologic  o Denies  o : headache, weakness, loss of consciousness  · Musculoskeletal  o Denies  o : painful, swollen joints  · Psychiatric  o Denies  o : drug/alcohol addiction, anxiety, depression      Vitals  Date Time BP Position Site L\R Cuff Size HR RR TEMP (F) WT  HT  BMI kg/m2 BSA m2 O2 Sat FR L/min FiO2        12/29/2020 02:16 PM      89 - R   210lbs 0oz 4'  11\" 42.41 1.99 90 %            Physical Examination  · Constitutional  o Appearance  o : well developed, well-nourished, no obvious " deformities present  · Head and Face  o Head  o :   § Inspection  § : normocephalic  o Face  o :   § Inspection  § : no facial lesions  · Eyes  o Conjunctivae  o : conjunctivae normal  o Sclerae  o : sclerae white  · Ears, Nose, Mouth and Throat  o Ears  o :   § External Ears  § : appearance within normal limits  § Hearing  § : intact  o Nose  o :   § External Nose  § : appearance normal  · Neck  o Inspection/Palpation  o : normal appearance  o Range of Motion  o : full range of motion  · Respiratory  o Respiratory Effort  o : breathing unlabored  o Inspection of Chest  o : normal appearance  o Auscultation of Lungs  o : no audible wheezing or rales  · Cardiovascular  o Heart  o : regular rate  · Gastrointestinal  o Abdominal Examination  o : soft and non-tender  · Skin and Subcutaneous Tissue  o General Inspection  o : intact, no rashes  · Psychiatric  o General  o : Alert and oriented x3  o Judgement and Insight  o : judgment and insight intact  o Mood and Affect  o : mood normal, affect appropriate  · Right Shoulder  o Inspection  o : Resolving ecchymosis to the upper arm, tenderness to palpation of lateral shoulder, ROM limited. Sensation intact to light touch.   · Right Wrist  o Inspection  o : Skin is intact, no erythema, no ecchymosis, no swelling. Tenderness to palpation of the dorsum of the wrist. ROM limited secondary to pain. Sensation intact to light touch. Patient able to wiggle fingers and thumb, makes a fist.   · In Office Procedures  o View  o : AP/LATERAL  o Site  o : right, wrist   o Indication  o : Right arm pain   o Study  o : X-rays ordered, taken in the office, and reviewed today.  o Xray  o : Multiple subchondral cysts to lunate, hamate, and capitate bones with advanced degenerative changes to the thumb CMC joint. No fracture, no dislocation.   · Imaging  o Imaging  o : X-ray right shoulder, 12/24/2020, CONCLUSION: Comminuted mildly displaced fracture of the humeral neck extending through the  humeral head and the greater tuberosity.           Assessment  · Pain: Right Shoulder     719.41/M25.519  · Pain: Right Wrist     719.43/M25.539  · Right Proximal humerus fracture     812.00/S42.209A      Plan  · Orders  o Wrist (Right) 2 views X-Ray [facility] ([id number]) - 719.43/M25.539 - 12/29/2020  · Medications  o Medications have been Reconciled  o Transition of Care or Provider Policy  · Instructions  o Reviewed the patient's Past Medical, Social, and Family history as well as the ROS at today's visit, no changes.  o Call or return if worsening symptoms.  o Follow Up in 3 weeks.  o The above service was scribed by Reece Barnett PA-C on my behalf and I attest to the accuracy of the note. jsb  o Reviewed x-rays with the patient and her daughter, advised the patient to use a wrist brace for her right wrist, she states she has one at home. We discussed surgical versus conservative treatments for her shoulder, due to patient history of difficulty waking from anesthesia with other health conditions patient and daughter agreed to conservative treatment. Patient was advised to continue sling use, avoid heavy lifting pushing pulling, work on gentle range of motion with pendulum and circles against gravity. Pain medication refill was given. Follow-up in 3 weeks with x-rays.            Electronically Signed by: Chong Barnett PA-C -Author on December 29, 2020 04:42:38 PM  Electronically Co-signed by: Mic Hart MD -Reviewer on December 29, 2020 05:48:53 PM

## 2021-05-10 NOTE — H&P
"   History and Physical      Patient Name: Elysia White   Patient ID: [id number]   Sex: Female   YOB: 1955    Primary Care Provider: Angelika WELLER   Referring Provider: AUGUSTIN MONTE    Visit Date: October 22, 2020    Provider: SHAHZAD Pickett   Location: [facility]   Location Address: [address] [place] [address], [address] [place] [address]  [id number]   Location Phone: [phone]          Chief Complaint     1.  Hearing loss    2.  Ear canal dermatitis    3.  Bilateral cerumen impactions       History Of Present Illness  Elysia White is a 64 year old /White female who presents to the office today as a consult from AUGUSTIN MONTE.      She presents to the clinic today for evaluation of her ears and hearing.  She reports that for many years she has began to notice a change in her hearing.  She states that her ear canals are itchy and she has \"psoriasis in her ears\".  She reports getting a buildup of earwax frequently within her ears.  She denies any otalgia or otorrhea.  She denies any issues with recurrent otitis media infections.  She states that she has been previously told greater than 10 years ago that she had a perforation in her left tympanic membrane.  She is currently living in a long-term care facility.       Past Medical History  Arthralgia of right hand; Arthritis; Bladder Disorder; Cancer; Chest pain; Chronic Obstructive Pulmonary Disease; Congestive heart failure; COPD; Diabetes; Hearing loss; Heart Attack; Hyperlipemia; Limb Swelling; Obstructive Sleep Apnea; Otitis Media; Reflux; Right carpal tunnel syndrome; Right thumb CMC osteoarthritis; Right wrist pain; Seasonal allergies; Seizure; Shortness of Breath; Stroke; Thyroid disorder         Past Surgical History  Heart Valves         Medication List  Abilify 5 mg oral tablet; acetaminophen 325 mg oral capsule; Aspir-81 81 mg oral tablet,delayed release (DR/EC); B-Complex With B-12 2.5 mg-2.5 mg- 5 mg-100 " mcg oral tablet; Calcium 600 600 mg calcium (1,500 mg) oral tablet; carbidopa-levodopa-entacapone -200 mg oral tablet; cranberry 400 mg oral capsule; escitalopram oxalate 10 mg oral tablet; folic acid 1 mg oral tablet; lactulose 10 gram oral packet; Lamictal 100 mg oral tablet; Lantus U-100 Insulin 100 unit/mL subcutaneous solution; levocetirizine 5 mg oral tablet; losartan 25 mg oral tablet; methotrexate sodium 2.5 mg oral tablet; metoprolol tartrate 25 mg oral tablet; Novolin 70/30 U-100 Insulin 100 unit/mL (70-30) subcutaneous suspension; Protonix 40 mg oral tablet,delayed release (DR/EC); Spiriva Respimat 2.5 mcg/actuation inhalation mist; Ventolin HFA 90 mcg/actuation inhalation HFA aerosol inhaler         Allergy List  azithromycin; clarithromycin; I.V. Dye; loratadine; MACROLIDE ANTIBIOTICS; PENICILLINS         Family Medical History  Family history of certain chronic disabling diseases; arthritis; Family history of cancer; Family history of diabetes mellitus         Social History  Tobacco (Former)         Review of Systems  · Constitutional  o Admits  o : weight loss  o Denies  o : fever, night sweats  · Eyes  o Denies  o : discharge from eye, impaired vision  · HENT  o Admits  o : *See HPI  · Cardiovascular  o Admits  o : irregular heart beats  o Denies  o : chest pain  · Respiratory  o Admits  o : shortness of breath, wheezing  o Denies  o : coughing up blood  · Gastrointestinal  o Admits  o : heartburn, reflux  o Denies  o : vomiting blood  · Genitourinary  o Admits  o : frequency  · Integument  o Admits  o : rash, skin dryness  · Neurologic  o Admits  o : seizures, loss of balance, dizziness  o Denies  o : loss of consciousness  · Endocrine  o Denies  o : cold intolerance, heat intolerance  · Heme-Lymph  o Denies  o : easy bleeding, anemia      Vitals  Date Time BP Position Site L\R Cuff Size HR RR TEMP (F) WT  HT  BMI kg/m2 BSA m2 O2 Sat FR L/min FiO2        10/22/2020 01:41 PM       22 96.9   "4' 11\"               Physical Examination  · Constitutional  o Appearance  o : well developed, well-nourished, alert and in no acute distress, voice clear and strong  · Head and Face  o Head  o :   § Inspection  § : no deformities or lesions  o Face  o :   § Inspection  § : No facial lesions; House-Brackmann I/VI bilaterally  § Palpation  § : No TMJ crepitus nor  muscle tenderness bilaterally  · Eyes  o Vision  o :   § Visual Fields  § : Extraocular movements are intact. No spontaneous or gaze-induced nystagmus.  o Conjunctivae  o : clear  o Sclerae  o : clear  o Pupils and Irises  o : pupils equal, round, and reactive to light.   · Ears, Nose, Mouth and Throat  o Ears  o :   § External Ears  § : appearance within normal limits, no lesions present  § Otoscopic Examination  § : Bilateral external auditory canals and bilateral ear canals with flaky, scaly skin, bilateral cerumen impactions, cleared under the microscope using a curette, tympanic membrane appearance within normal limits bilaterally without perforations, well-aerated middle ears  § Hearing  § : intact to conversational voice both ears  o Nose  o :   § External Nose  § : appearance normal  § Intranasal Exam  § : mucosa within normal limits, vestibules normal, no intranasal lesions present, septum midline, sinuses non tender to percussion  o Oral Cavity  o :   § Oral Mucosa  § : oral mucosa normal without pallor or cyanosis  § Lips  § : lip appearance normal  § Teeth  § : normal dentition for age  § Gums  § : gums pink, non-swollen, no bleeding present  § Tongue  § : tongue appearance normal; normal mobility  § Palate  § : hard palate normal, soft palate appearance normal with symmetric mobility  o Throat  o :   § Oropharynx  § : no inflammation or lesions present, tonsils within normal limits  · Neck  o Inspection/Palpation  o : normal appearance, no masses or tenderness, trachea midline; thyroid size normal, nontender, no nodules or masses " "present on palpation  · Respiratory  o Respiratory Effort  o : breathing unlabored  o Inspection of Chest  o : normal appearance, no retractions  · Lymphatic  o Neck  o : no lymphadenopathy present  o Supraclavicular Nodes  o : no lymphadenopathy present  o Preauricular Nodes  o : no lymphadenopathy present  · Skin and Subcutaneous Tissue  o General Inspection  o : Regarding face and neck - there are no rashes present, no lesions present, and no areas of discoloration  · Neurologic  o Cranial Nerves  o : cranial nerves II-XII are grossly intact bilaterally  o Gait and Station  o : normal gait, able to stand without diffculty  · Psychiatric  o Judgement and Insight  o : judgment and insight intact  o Mood and Affect  o : mood normal, affect appropriate          Assessment  · Hearing loss     389.9/H91.90  · Impacted cerumen, bilateral     380.4/H61.23  · Dermatitis of both ear canals     380.22/H60.543      Plan  · Orders  o Audiometry, pure-tone (threshold); [facility] ([id number]) - 389.9/H91.90 - 10/22/2020  o Tympanogram (Impedance Testing) [facility] ([id number]) - 389.9/H91.90 - 10/22/2020  o Cerumen Removal by Provider, Unilateral [facility] ([id number]) - 380.4/H61.23 - 10/22/2020  · Medications  o triamcinolone acetonide 0.1 % topical cream   SIG: apply a thin layer to the external ears) by topical route 2 times per day PRN   DISP: (1) Tube with 0 refills  Prescribed on 10/22/2020     o Medications have been Reconciled  o Transition of Care or Provider Policy  · Instructions  o She presents to the clinic today for evaluation of her ears and hearing. She reports that for many years she has began to notice a change in her hearing. She states that her ear canals are itchy and she has \"psoriasis in her ears\". She reports getting a buildup of earwax frequently within her ears. She denies any otalgia or otorrhea. She denies any issues with recurrent otitis media infections. She states that she has been previously told greater than 10 years " ago that she had a perforation in her left tympanic membrane. She is currently living in a long-term care facility. On examination today Bilateral external auditory canals and bilateral ear canals have flaky, scaly skin, and bilateral cerumen impactions. I was able to clear the cerumen impactions under the microscope using a curette. The tympanic membrane appearance is within normal limits bilaterally without perforations, middle ears are well aerated. We did obtain an audiogram and tympanogram. The audiologist expressed to me that her responses during the testing were often not consistent and therefore the validity of the test could be unreliable. Essentially the right ear shows normal hearing in the left ear shows a mild loss rising to normal hearing and then sloping back down to a mild loss. The loss appears sensorineural in nature but the reliability is poor. Speech reception threshold was at 15 dB on the right and 25 dB on the left. Word discrimination scores were 92% on the right and 88% on the left at 65 dB. She was unable to perform the tympanograms as she could not get the machine to seal properly. Likely from the dermatitis present in her ear canals. I will also have her start on triamcinolone cream for her ear canals. I discouraged her from using anything in her ear such as her fingers, Q-tips or other objects. I feel like her ear discomfort will be improved if we can get the dermatitis improved. Advised her if she notices a significant change in her hearing to return in 1 year for repeat audiogram.  o Electronically Identified Patient Education Materials Provided Electronically  · Correspondence  o ENT Letter to Referring MD (AUGUSTIN MONTE) - 10/22/2020            Electronically Signed by: SHAHZAD Pickett -Author on October 22, 2020 03:20:22 PM

## 2021-05-14 NOTE — PROGRESS NOTES
Progress Note      Patient Name: Elysia White   Patient ID: [id number]   Sex: Female   YOB: 1955    Primary Care Provider: David Donaldson MD   Referring Provider: David Donaldson MD    Visit Date: March 22, 2021    Provider: Chong Barnett PA-C   Location: [facility]   Location Address: [address] [place] [address]  [id number]   Location Phone: [phone]          Chief Complaint  · Right shoulder pain      History Of Present Illness  Elysia White is a 65 year old /White female who presents today to [facility]. Patient presents for follow-up evaluation of right proximal humerus fracture, 12/24/2020, patient presents with her daughter. Patient states she still has pain in the shoulder, patient is nonsurgical candidate, patient states she takes pain medication from her nursing home with relief of pain, she states she has had more physical therapy visits since the last time we saw her and they are working on range of motion and strength with her. Patient denies numbness and tingling.       Past Medical History  Anemia, Unspecified; Arthralgia of right hand; Arthritis; Bladder disorder; Blood Diseases; Cancer; Chest pain; Chronic Obstructive Pulmonary Disease; Congestive heart failure; COPD; Diabetes; Hearing loss; Heart Attack; Hyperlipemia; Kidney Disease; Limb Swelling; Obstructive Sleep Apnea; Otitis Media; Parkinson's Disease; Rectal bleeding; Reflux; Right Carpal Tunnel Syndrome; Right thumb CMC osteoarthritis; Right wrist pain; Seasonal allergies; Seizure; Shortness of Breath; Stroke; Thyroid disorder         Past Surgical History  heart surgery; Heart Valves         Medication List  acetaminophen 325 mg oral capsule; aripiprazole 5 mg oral tablet; Aspir-81 81 mg oral tablet,delayed release (DR/EC); B-Complex With B-12 2.5 mg-2.5 mg- 5 mg-100 mcg oral tablet; Calcium 600 600 mg calcium (1,500 mg) oral tablet; Colace 100 mg oral  capsule; Debrox 6.5 % otic (ear) drops; escitalopram oxalate 10 mg oral tablet; folic acid 1 mg oral tablet; Humalog U-100 Insulin 100 unit/mL subcutaneous solution; lactulose 10 gram oral packet; Lamictal 100 mg oral tablet; Lantus U-100 Insulin 100 unit/mL subcutaneous solution; levetiracetam 500 mg oral tablet; levocetirizine 5 mg oral tablet; losartan 25 mg oral tablet; Magnesium (oxide/AA chelate) 300 mg oral capsule; methotrexate sodium 2.5 mg oral tablet; metoprolol tartrate 25 mg oral tablet; neomycin-bacitracin-poly-HC 3.5-400-10,000 mg-unit/g-1% ophthalmic (eye) ointment; Novolin 70/30 U-100 Insulin 100 unit/mL (70-30) subcutaneous suspension; polymyxin B sulf-trimethoprim 10,000 unit- 1 mg/mL ophthalmic (eye) drops; prochlorperazine maleate 5 mg oral tablet; Protonix 40 mg oral tablet,delayed release (DR/EC); Robitussin Cough and Cold CF 2.5-5-50 mg/5 mL oral liquid; Sinemet  mg oral tablet; Spiriva Respimat 2.5 mcg/actuation inhalation mist; Symbicort 160-4.5 mcg/actuation inhalation HFA aerosol inhaler; Ventolin HFA 90 mcg/actuation inhalation HFA aerosol inhaler; Visine Tears 1-0.2-0.2 % ophthalmic (eye) drops; Voltaren 1 % topical gel; warfarin 5 mg oral tablet         Allergy List  azithromycin; clarithromycin; I.V. Dye; loratadine; MACROLIDE ANTIBIOTICS; PENICILLINS       Allergies Reconciled  Family Medical History  Cancer, Unspecified; Diabetes, unspecified type; Family history of certain chronic disabling diseases; arthritis; No family history of colorectal cancer; Family history of Arthritis; Family history of cancer; Family history of diabetes mellitus         Social History  Alcohol Use (Never); .; lives in a nursing home/correction home; Recreational Drug Use (Never); Tobacco (Former); Unemployed.         Review of Systems  · Constitutional  o Denies  o : fever, chills, weight loss  · Cardiovascular  o Denies  o : chest pain, shortness of  breath  · Gastrointestinal  o Denies  o : liver disease, heartburn, nausea, blood in stools  · Genitourinary  o Denies  o : painful urination, blood in urine  · Integument  o Denies  o : rash, itching  · Neurologic  o Denies  o : headache, weakness, loss of consciousness  · Musculoskeletal  o Denies  o : painful, swollen joints  · Psychiatric  o Denies  o : drug/alcohol addiction, anxiety, depression      Vitals  Date Time BP Position Site L\R Cuff Size HR RR TEMP (F) WT  HT  BMI kg/m2 BSA m2 O2 Sat FR L/min FiO2 HC       03/22/2021 01:35 PM      80 - R   176lbs 6oz 5'   34.45 1.84 90 %            Physical Examination  · Constitutional  o Appearance  o : well developed, well-nourished, no obvious deformities present  · Head and Face  o Head  o :   § Inspection  § : normocephalic  o Face  o :   § Inspection  § : no facial lesions  · Eyes  o Conjunctivae  o : conjunctivae normal  o Sclerae  o : sclerae white  · Ears, Nose, Mouth and Throat  o Ears  o :   § External Ears  § : appearance within normal limits  § Hearing  § : intact  o Nose  o :   § External Nose  § : appearance normal  · Neck  o Inspection/Palpation  o : normal appearance  o Range of Motion  o : full range of motion  · Respiratory  o Respiratory Effort  o : breathing unlabored  o Inspection of Chest  o : normal appearance  o Auscultation of Lungs  o : no audible wheezing or rales  · Cardiovascular  o Heart  o : regular rate  · Gastrointestinal  o Abdominal Examination  o : soft and non-tender  · Skin and Subcutaneous Tissue  o General Inspection  o : intact, no rashes  · Psychiatric  o General  o : Alert and oriented x3  o Judgement and Insight  o : judgment and insight intact  o Mood and Affect  o : mood normal, affect appropriate  · Right Shoulder  o Inspection  o : Skin is intact, no erythema, ecchymosis, no swelling, nontender to palp patient, active forward elevation 80, active abduction 80, passive forward elevation 120, passive abduction 110, mild  pain with range of motion,.  · In Office Procedures  o View  o : AP/LATERAL  o Site  o : right, scapula  o Indication  o : Right shoulder pain  o Study  o : X-rays ordered, taken in the office, and reviewed today.  o Xray  o : Good healing of proximal humerus fracture, no increased displacement or angulation.  o Comparative Data  o : No comparative data found          Assessment  · Right shoulder pain, unspecified chronicity     719.41/M25.511  · Right proximal humerus fracture     812.00/S42.209A      Plan  · Orders  o Scapula (Right) [facility] ([id number]) - 719.41/M25.511 - 03/22/2021  · Medications  o Medications have been Reconciled  o Transition of Care or Provider Policy  · Instructions  o Reviewed the patient's Past Medical, Social, and Family history as well as the ROS at today's visit, no changes.  o Call or return if worsening symptoms.  o Follow up in 6 weeks.  o Reviewed x-rays with the patient and her daughter, patient was advised to continue physical therapy, new order was written, they may get more aggressive with strength and range of motion, no heavy lifting pushing or pulling, follow-up in 6 weeks with x-rays.            Electronically Signed by: Chong Barnett PA-C -Author on March 22, 2021 02:18:09 PM

## 2021-05-14 NOTE — PROGRESS NOTES
Progress Note      Patient Name: Elysia White   Patient ID: [id number]   Sex: Female   YOB: 1955    Primary Care Provider: Angelika WELLER   Referring Provider: AUGUSTIN MONTE    Visit Date: January 19, 2021    Provider: Mic Hart MD   Location: [facility]   Location Address: [address] [place] [address]  [id number]   Location Phone: [phone]          Chief Complaint  · Right shoulder pain      History Of Present Illness  Elysia White is a 65 year old /White female who presents today to [facility].      The patient presents here today for follow up evaluation of her right shoulder/wrist. Patient states on 12/24/20 she was stepping off her porch and misstepped and fell and landed on her right shoulder and wrist. She is here today with her daughter. She is ambulating with a wheelchair. She is wearing a sling and a brace on her right shoulder and wrist. She is currently staying at [facility]. \[facility].\"".                Past Medical History  Anemia, Unspecified; Arthralgia of right hand; Arthritis; Bladder Disorder; Blood Diseases; Cancer; Chest pain; Chronic Obstructive Pulmonary Disease; Congestive heart failure; COPD; Diabetes; Hearing loss; Heart Attack; Hyperlipemia; Limb Swelling; Obstructive Sleep Apnea; Otitis Media; Rectal bleeding; Reflux; Right Carpal Tunnel Syndrome; Right thumb CMC osteoarthritis; Right wrist pain; Seasonal allergies; Seizure; Shortness of Breath; Stroke; Thyroid disorder         Past Surgical History  Heart Valves         Medication List  Abilify 5 mg oral tablet; acetaminophen 325 mg oral capsule; Aspir-81 81 mg oral tablet,delayed release (DR/EC); B-Complex With B-12 2.5 mg-2.5 mg- 5 mg-100 mcg oral tablet; Calcium 600 600 mg calcium (1,500 mg) oral tablet; carbidopa-levodopa-entacapone -200 mg oral tablet; cranberry 400 mg oral capsule; escitalopram oxalate 10 mg oral tablet; folic acid 1 mg oral tablet; lactulose  10 gram oral packet; Lamictal 100 mg oral tablet; Lantus U-100 Insulin 100 unit/mL subcutaneous solution; levocetirizine 5 mg oral tablet; losartan 25 mg oral tablet; methotrexate sodium 2.5 mg oral tablet; metoprolol tartrate 25 mg oral tablet; Norco 7.5-325 mg oral tablet; Novolin 70/30 U-100 Insulin 100 unit/mL (70-30) subcutaneous suspension; Protonix 40 mg oral tablet,delayed release (DR/EC); Spiriva Respimat 2.5 mcg/actuation inhalation mist; triamcinolone acetonide 0.1 % topical cream; Ventolin HFA 90 mcg/actuation inhalation HFA aerosol inhaler         Allergy List  azithromycin; clarithromycin; I.V. Dye; loratadine; MACROLIDE ANTIBIOTICS; PENICILLINS         Family Medical History  Cancer, Unspecified; Diabetes, unspecified type; Family history of certain chronic disabling diseases; arthritis; Family history of Arthritis; Family history of cancer; Family history of diabetes mellitus         Social History  Alcohol Use (Never); .; lives in a nursing home/[facility]; Recreational Drug Use (Never); Tobacco (Former); Unemployed.         Review of Systems  · Constitutional  o Denies  o : fever, chills, weight loss  · Cardiovascular  o Denies  o : chest pain, shortness of breath  · Gastrointestinal  o Denies  o : liver disease, heartburn, nausea, blood in stools  · Genitourinary  o Denies  o : painful urination, blood in urine  · Integument  o Denies  o : rash, itching  · Neurologic  o Denies  o : headache, weakness, loss of consciousness  · Musculoskeletal  o Denies  o : painful, swollen joints  · Psychiatric  o Denies  o : drug/alcohol addiction, anxiety, depression      Physical Examination  · Constitutional  o Appearance  o : well developed, well-nourished, no obvious deformities present  · Head and Face  o Head  o :   § Inspection  § : normocephalic  o Face  o :   § Inspection  § : no facial lesions  · Eyes  o Conjunctivae  o : conjunctivae normal  o Sclerae  o : sclerae white  · Ears, Nose,  Mouth and Throat  o Ears  o :   § External Ears  § : appearance within normal limits  § Hearing  § : intact  o Nose  o :   § External Nose  § : appearance normal  · Neck  o Inspection/Palpation  o : normal appearance  o Range of Motion  o : full range of motion  · Respiratory  o Respiratory Effort  o : breathing unlabored  o Inspection of Chest  o : normal appearance  o Auscultation of Lungs  o : no audible wheezing or rales  · Cardiovascular  o Heart  o : regular rate  · Gastrointestinal  o Abdominal Examination  o : soft and non-tender  · Skin and Subcutaneous Tissue  o General Inspection  o : intact, no rashes  · Psychiatric  o General  o : Alert and oriented x3  o Judgement and Insight  o : judgment and insight intact  o Mood and Affect  o : mood normal, affect appropriate  · Extremities  o Extremities  o : Right Upper Extremity: Mild tenderness to palpation. No significant bruising, mild swelling. Good sensation moves fingers and thumb.   · Imaging  o Imaging  o : [facility] X-ray 1/2021: Humerus: Fracture of the surgical neck with greater tuberosity extension. No dislocation of the head. Joint space narrowing. Mild soft tissue swelling. Wrist: No acute osseous abnormality. Degenerative changes.           Assessment  · Right shoulder pain, unspecified chronicity     719.41/M25.511  · Humerus fracture     812.20/S42.309A      Plan  · Medications  o Medications have been Reconciled  o Transition of Care or Provider Policy  · Instructions  o Reviewed the patient's Past Medical, Social, and Family history as well as the ROS at today's visit, no changes.  o Call or return if worsening symptoms.  o Follow Up in 4 weeks.   o The above service was scribed by Marily Ramachandran on my behalf and I attest to the accuracy of the note. jsb  o Discussed the treatment plan with the patient and her daughter. I recommended to continue conservative treatment. Plan to continue sling and brace. An order for physical therapy  for [facility] given today. She can start to come out of the sling some as well. Follow up in 4 weeks with repeat x-rays.  o Electronically Identified Patient Education Materials Provided Electronically            Electronically Signed by: Marily Ramachandran MA -Author on January 20, 2021 08:31:15 AM  Electronically Co-signed by: Mic Hart MD -Reviewer on January 20, 2021 09:01:40 PM

## 2021-05-14 NOTE — PROGRESS NOTES
Progress Note      Patient Name: Elysia White   Patient ID: [id number]   Sex: Female   YOB: 1955    Primary Care Provider: David Donaldson MD   Referring Provider: David Donaldson MD    Visit Date: May 3, 2021    Provider: Chong Barnett PA-C   Location: [facility]   Location Address: [address] [place] [address]  [id number]   Location Phone: [phone]          Chief Complaint  · Follow up right shoulder pain      History Of Present Illness  Elysia White is a 65 year old /White female who presents today to [facility]. Patient presents with her daughter for follow-up evaluation of right proximal humerus fracture, original injury was 12/24/2020. Patient states she still has some pain in the shoulder, she states she has recently had a CT scan of both shoulders by her nursing home facility. Patient states her right shoulder continues to get improvement with range of motion and strength she continues physical therapy at her nursing care facility. Patient continues pain medications from her care facility. Patient denies numbness and tingling, no new complaints today.       Past Medical History  Anemia, Unspecified; Arthralgia of right hand; Arthritis; Bladder disorder; Blood Diseases; Cancer; Chest pain; Chronic Obstructive Pulmonary Disease; Congestive heart failure; COPD; Diabetes; Hearing loss; Heart Attack; Hyperlipemia; Kidney Disease; Limb Swelling; Obstructive Sleep Apnea; Otitis Media; Parkinson's Disease; Rectal bleeding; Reflux; Right Carpal Tunnel Syndrome; Right thumb CMC osteoarthritis; Right wrist pain; Seasonal allergies; Seizure; Shortness of Breath; Stroke; Thyroid disorder         Past Surgical History  heart surgery; Heart Valves         Medication List  acetaminophen 325 mg oral capsule; aripiprazole 5 mg oral tablet; Aspir-81 81 mg oral tablet,delayed release (DR/EC); B-Complex With B-12 2.5 mg-2.5 mg- 5 mg-100 mcg oral  tablet; Calcium 600 600 mg calcium (1,500 mg) oral tablet; Colace 100 mg oral capsule; Debrox 6.5 % otic (ear) drops; escitalopram oxalate 10 mg oral tablet; folic acid 1 mg oral tablet; Humalog U-100 Insulin 100 unit/mL subcutaneous solution; lactulose 10 gram oral packet; Lamictal 100 mg oral tablet; Lantus U-100 Insulin 100 unit/mL subcutaneous solution; levetiracetam 500 mg oral tablet; levocetirizine 5 mg oral tablet; losartan 25 mg oral tablet; Magnesium (oxide/AA chelate) 300 mg oral capsule; methotrexate sodium 2.5 mg oral tablet; metoprolol tartrate 25 mg oral tablet; neomycin-bacitracin-poly-HC 3.5-400-10,000 mg-unit/g-1% ophthalmic (eye) ointment; Novolin 70/30 U-100 Insulin 100 unit/mL (70-30) subcutaneous suspension; polymyxin B sulf-trimethoprim 10,000 unit- 1 mg/mL ophthalmic (eye) drops; prochlorperazine maleate 5 mg oral tablet; Protonix 40 mg oral tablet,delayed release (DR/EC); Robitussin Cough and Cold CF 2.5-5-50 mg/5 mL oral liquid; Sinemet  mg oral tablet; Spiriva Respimat 2.5 mcg/actuation inhalation mist; Symbicort 160-4.5 mcg/actuation inhalation HFA aerosol inhaler; Ventolin HFA 90 mcg/actuation inhalation HFA aerosol inhaler; Visine Tears 1-0.2-0.2 % ophthalmic (eye) drops; Voltaren 1 % topical gel; warfarin 5 mg oral tablet         Allergy List  azithromycin; clarithromycin; I.V. Dye; loratadine; MACROLIDE ANTIBIOTICS; PENICILLINS       Allergies Reconciled  Family Medical History  Cancer, Unspecified; Diabetes, unspecified type; Family history of certain chronic disabling diseases; arthritis; No family history of colorectal cancer; Family history of Arthritis; Family history of cancer; Family history of diabetes mellitus         Social History  Alcohol Use (Never); .; lives in a nursing home/skilled nursing home; Recreational Drug Use (Never); Tobacco (Former); Unemployed.         Review of Systems  · Constitutional  o Denies  o : fever, chills, weight  loss  · Cardiovascular  o Denies  o : chest pain, shortness of breath  · Gastrointestinal  o Denies  o : liver disease, heartburn, nausea, blood in stools  · Genitourinary  o Denies  o : painful urination, blood in urine  · Integument  o Denies  o : rash, itching  · Neurologic  o Denies  o : headache, weakness, loss of consciousness  · Musculoskeletal  o Denies  o : painful, swollen joints  · Psychiatric  o Denies  o : drug/alcohol addiction, anxiety, depression      Vitals  Date Time BP Position Site L\R Cuff Size HR RR TEMP (F) WT  HT  BMI kg/m2 BSA m2 O2 Sat FR L/min FiO2 HC       05/03/2021 02:06 PM      78 - R   184lbs 16oz    90 %            Physical Examination  · Constitutional  o Appearance  o : well developed, well-nourished, no obvious deformities present  · Head and Face  o Head  o :   § Inspection  § : normocephalic  o Face  o :   § Inspection  § : no facial lesions  · Eyes  o Conjunctivae  o : conjunctivae normal  o Sclerae  o : sclerae white  · Ears, Nose, Mouth and Throat  o Ears  o :   § External Ears  § : appearance within normal limits  § Hearing  § : intact  o Nose  o :   § External Nose  § : appearance normal  · Neck  o Inspection/Palpation  o : normal appearance  o Range of Motion  o : full range of motion  · Respiratory  o Respiratory Effort  o : breathing unlabored  o Inspection of Chest  o : normal appearance  o Auscultation of Lungs  o : no audible wheezing or rales  · Cardiovascular  o Heart  o : regular rate  · Gastrointestinal  o Abdominal Examination  o : soft and non-tender  · Skin and Subcutaneous Tissue  o General Inspection  o : intact, no rashes  · Psychiatric  o General  o : Alert and oriented x3  o Judgement and Insight  o : judgment and insight intact  o Mood and Affect  o : mood normal, affect appropriate  · Right Shoulder  o Inspection  o : Nontender to palpation, mild pain with range of motion, active forward elevation 95, passive forward elevation 140, active abduction 95,  passive abduction 120, external rotation with abduction 75, internal rotation to her buttock. Neurovascularly intact.  · Imaging  o Imaging  o : Patient brought a x-ray disc which was sent from her care facility, x-rays were reviewed, revealing good healing and alignment of proximal humerus fracture, no increased displacement or angulation.          Assessment  · Pain: Shoulder     719.41/M25.519  · Right proximal humerus fracture     812.00/S42.209A      Plan  · Medications  o Medications have been Reconciled  o Transition of Care or Provider Policy  · Instructions  o Reviewed the patient's Past Medical, Social, and Family history as well as the ROS at today's visit, no changes.  o Call or return if worsening symptoms.  o Follow up in 6 weeks.  o Reviewed x-rays with the patient and her daughter, advised him that the patient may continue physical therapy, activity and range of motion as tolerated, follow-up in 6 weeks with x-rays.            Electronically Signed by: JANEE Murphy-LUIS -Author on May 3, 2021 02:42:13 PM  Electronically Co-signed by: Mic Hart MD -Reviewer on May 4, 2021 10:30:53 PM

## 2021-05-14 NOTE — PROGRESS NOTES
Progress Note      Patient Name: Elysia White   Patient ID: [id number]   Sex: Female   YOB: 1955    Primary Care Provider: David Donaldson MD   Referring Provider: David Donaldson MD    Visit Date: February 25, 2021    Provider: Chong Barnett PA-C   Location: [facility]   Location Address: [address] [place] [address]  [id number]   Location Phone: [phone]          Chief Complaint  · Right shoulder pain      History Of Present Illness  Elysia White is a 65 year old /White female who presents today to [facility]. Patient presents for follow-up evaluation of right proximal humerus fracture, original injury was 12/24/2020. Patient presents in wheelchair, presents with family. Patient states her pain has not improved, she states she still has pain in the shoulder she states it is worse with movement. Patient states she has had therapist from the nursing home she is staying at help her with range of motion, she states that they have been giving her some exercises to do and she has been doing them on patient states she gets a pain medication from her nursing home which helps her pain, patient denies numbness and tingling.       Past Medical History  Anemia, Unspecified; Arthralgia of right hand; Arthritis; Bladder Disorder; Blood Diseases; Cancer; Chest pain; Chronic Obstructive Pulmonary Disease; Congestive heart failure; COPD; Diabetes; Hearing loss; Heart Attack; Hyperlipemia; Kidney Disease; Limb Swelling; Obstructive Sleep Apnea; Otitis Media; Parkinson's Disease; Rectal bleeding; Reflux; Right Carpal Tunnel Syndrome; Right thumb CMC osteoarthritis; Right wrist pain; Seasonal allergies; Seizure; Shortness of Breath; Stroke; Thyroid disorder         Past Surgical History  heart surgery; Heart Valves         Medication List  acetaminophen 325 mg oral capsule; aripiprazole 5 mg oral tablet; Aspir-81 81 mg oral tablet,delayed release (DR/EC); B-Complex With  B-12 2.5 mg-2.5 mg- 5 mg-100 mcg oral tablet; Calcium 600 600 mg calcium (1,500 mg) oral tablet; Colace 100 mg oral capsule; Debrox 6.5 % otic (ear) drops; escitalopram oxalate 10 mg oral tablet; folic acid 1 mg oral tablet; Humalog U-100 Insulin 100 unit/mL subcutaneous solution; lactulose 10 gram oral packet; Lamictal 100 mg oral tablet; Lantus U-100 Insulin 100 unit/mL subcutaneous solution; levetiracetam 500 mg oral tablet; levocetirizine 5 mg oral tablet; losartan 25 mg oral tablet; Magnesium (oxide/AA chelate) 300 mg oral capsule; methotrexate sodium 2.5 mg oral tablet; metoprolol tartrate 25 mg oral tablet; neomycin-bacitracin-poly-HC 3.5-400-10,000 mg-unit/g-1% ophthalmic (eye) ointment; Novolin 70/30 U-100 Insulin 100 unit/mL (70-30) subcutaneous suspension; polymyxin B sulf-trimethoprim 10,000 unit- 1 mg/mL ophthalmic (eye) drops; prochlorperazine maleate 5 mg oral tablet; Protonix 40 mg oral tablet,delayed release (DR/EC); Robitussin Cough and Cold CF 2.5-5-50 mg/5 mL oral liquid; Sinemet  mg oral tablet; Spiriva Respimat 2.5 mcg/actuation inhalation mist; Symbicort 160-4.5 mcg/actuation inhalation HFA aerosol inhaler; Ventolin HFA 90 mcg/actuation inhalation HFA aerosol inhaler; Visine Tears 1-0.2-0.2 % ophthalmic (eye) drops; Voltaren 1 % topical gel; warfarin 5 mg oral tablet         Allergy List  azithromycin; clarithromycin; I.V. Dye; loratadine; MACROLIDE ANTIBIOTICS; PENICILLINS       Allergies Reconciled  Family Medical History  Cancer, Unspecified; Diabetes, unspecified type; Family history of certain chronic disabling diseases; arthritis; No family history of colorectal cancer; Family history of Arthritis; Family history of cancer; Family history of diabetes mellitus         Social History  Alcohol Use (Never); .; lives in a nursing home/[facility]; Recreational Drug Use (Never); Tobacco (Former); Unemployed.         Review of Systems  · Constitutional  o Denies  o : fever,  chills, weight loss  · Cardiovascular  o Denies  o : chest pain, shortness of breath  · Gastrointestinal  o Denies  o : liver disease, heartburn, nausea, blood in stools  · Genitourinary  o Denies  o : painful urination, blood in urine  · Integument  o Denies  o : rash, itching  · Neurologic  o Denies  o : headache, weakness, loss of consciousness  · Musculoskeletal  o Denies  o : painful, swollen joints  · Psychiatric  o Denies  o : drug/alcohol addiction, anxiety, depression      Vitals  Date Time BP Position Site L\R Cuff Size HR RR TEMP (F) WT  HT  BMI kg/m2 BSA m2 O2 Sat FR L/min FiO2 HC       02/25/2021 10:55 AM      75 - R   194lbs 16oz 5'   38.08 1.94 96 %            Physical Examination  · Constitutional  o Appearance  o : well developed, well-nourished, no obvious deformities present  · Head and Face  o Head  o :   § Inspection  § : normocephalic  o Face  o :   § Inspection  § : no facial lesions  · Eyes  o Conjunctivae  o : conjunctivae normal  o Sclerae  o : sclerae white  · Ears, Nose, Mouth and Throat  o Ears  o :   § External Ears  § : appearance within normal limits  § Hearing  § : intact  o Nose  o :   § External Nose  § : appearance normal  · Neck  o Inspection/Palpation  o : normal appearance  o Range of Motion  o : full range of motion  · Respiratory  o Respiratory Effort  o : breathing unlabored  o Inspection of Chest  o : normal appearance  o Auscultation of Lungs  o : no audible wheezing or rales  · Cardiovascular  o Heart  o : regular rate  · Gastrointestinal  o Abdominal Examination  o : soft and non-tender  · Skin and Subcutaneous Tissue  o General Inspection  o : intact, no rashes  · Psychiatric  o General  o : Alert and oriented x3  o Judgement and Insight  o : judgment and insight intact  o Mood and Affect  o : mood normal, affect appropriate  · Right Shoulder  o Inspection  o : Skin is intact, no erythema, no ecchymosis, no swelling, no signs of infection. Mild tenderness to palpation,  mild pain with range of motion, range of motion appropriate, neurovascularly intact.  · Imaging  o Imaging  o : Patient brought a disc with x-rays from her nursing home, no report was given, x-ray 2 views of the right shoulder reveals good healing of proximal humerus fracture, no increased displacement or angulation.          Assessment  · Right shoulder pain, unspecified chronicity     719.41/M25.511  · Right proximal humerus fracture     812.00/S42.209A      Plan  · Medications  o Medications have been Reconciled  o Transition of Care or Provider Policy  · Instructions  o Reviewed the patient's Past Medical, Social, and Family history as well as the ROS at today's visit, no changes.  o Call or return if worsening symptoms.  o Follow Up in 4 weeks.   o Reviewed x-rays with the patient and her daughter, advised them that we recommend home health physical therapy to attend to her, she was given an order for this, note was sent to the nursing home to let them know. Patient was advised that she should receive assisted/monitored physical therapy, they may work on strength and range of motion, mainly passive range of motion with therapy. Patient to take pain medication as administered by her nursing home as needed. Follow-up in 4 weeks with x-rays.            Electronically Signed by: JANEE Murphy-C -Author on February 25, 2021 12:13:05 PM  Electronically Co-signed by: Mic Hart MD -Reviewer on February 25, 2021 08:29:26 PM

## 2021-05-28 NOTE — PROGRESS NOTES
Patient: JULIANE BEDOLLA     Acct: [id number]     Report: #[id number]  UNIT #: [id number]     : 1955    Encounter Date:12/10/2020  PRIMARY CARE: David Donaldson  ***Signed***  --------------------------------------------------------------------------------------------------------------------  Chief Complaint      Encounter Date      Dec 10, 2020            Primary Care Provider      BRISEYDA LEYVA            Referring Provider      BRISEYDA LEYVA            Patient Complaint      Patient is complaining of      PT here today for F/U, COPD            VITALS      Height 4 ft 11.00 in / 149.86 cm      Weight 170 lbs  / 77.904410 kg      BSA 1.72 m2      BMI 34.3 kg/m2      Temperature 97.8 F / 36.56 C - Temporal      Pulse 74      Respirations 15      Blood Pressure 140/60 Sitting, Right Arm      Pulse Oximetry 96%, room air            HPI      The patient is a  64 year old female patient of Dr. Randhawa's with a history of     chronic hypoxic respiratory failure, obstructive sleep apnea and history of non     Hodgkin's lymphoma who presents for follow up today.             The patient had a PET scan performed for 1 cm pleural based nodule in the right     lower lobe that resulted in no FDG uptake. The patient additionally had a     paratracheal lymph nodes that did not have FDG uptake. The patient had multiple     uptake in the left hilum but no discreet mass or lymphadenopathy appreciated. T    he patient states she was diagnosed with COVID-19 at the end of October and the     patient's medical assistant Kandis is present with the patient today. The     patient resides at [facility]. The patient's medical assistant states that     the patient was diagnosed with COVID-19 and hospitalized in [place] and     released 3-4 weeks ago. The patient states she is feeling much better. Un    fortunately the patient and her medical assistant do not have any records with     them in the office today and do not have a  medicine list and states they will     fax over a copy of her records and medicine  list. The patient states at times     she does get short of breath that is moderate in severity, worse with exertion,     improved with rest. The patient denies any cough, wheezing,  fever or chills,     night sweats, hemoptysis,  purulent sputum production, swollen glands in head     and neck, unintentional weight loss, chest pain or chest tightness, abdominal     pain, nausea or vomiting or diarrhea. The patient denies  any headaches,     myalgias, sore throat, changes in sense of taste and smell any coronavirus or     flu like symptoms.  The patient wears oxygen 24/7 at 2 liters per minute via     nasal cannula. The patient had a split  sleep study completed on 09/28/2020 and     she does have obstructive sleep apnea recommending CPAP with 15 cm of water     pressure, a full face mask and a  15 minute ramp. The patient states she has not    received her CPAP machine.             I reviewed the Review of Systems, medical, surgical and family history and agree    with those as entered.      Copies To:   ESTHER STEELE      Constitutional:  Denies: Fatigue, Fever, Weight gain, Weight loss, Chills,     Insomnia, Other      Respiratory/Breathing:  Complains of: Shortness of air; Denies: Wheezing, Cough,    Hemoptysis, Pleuritic pain, Other      Endocrine:  Denies: Polydipsia, Polyuria, Heat/cold intolerance, Abnorml     menstrual pattern, Diabetes, Other      Eyes:  Denies: Blurred vision, Vision Changes, Other      Ears, nose, mouth, throat:  Denies: Congestion, Dysphagia, Hearing Changes, Nose    Bleeding, Nasal Discharge, Throat pain, Tinnitus, Other      Cardiovascular:  Denies: Chest Pain, Exertional dyspnea, Peripheral Edema, Pa    lpitations, Syncope, Wake up Gasping for air, Orthopnea, Tachycardia, Other      Gastrointestinal:  Denies: Abdominal pain/cramping, Bloody stools, Constipation,    Diarrhea, Melena,  Nausea, Vomiting, Other      Genitourinary:  Denies: Dysuria, Urinary frequency, Incontinence, Hematuria,     Urgency, Other      Musculoskeletal:  Denies: Joint Pain, Joint Stiffness, Joint Swelling, Myalgias,    Other      Hematologic/lymphatic:  DENIES: Lymphadenopathy, Bruising, Bleeding tendencies,     Other      Neurologic:  Denies: Headache, Numbness, Weakness, Seizures, Other      Psychiatric:  Denies: Anxiety, Appropriate Effect, Depression, Other      Sleep:  No: Excessive daytime sleep, Morning Headache?, Snoring, Insomnia?, Stop    breathing at sleep?, Other      Integumentary:  Denies: Rash, Dry skin, Skin Warm to Touch, Other            FAMILY/SOCIAL/MEDICAL HX      Surgical History:  Yes: Bladder Surgery (KIDNEY TUMOR ), Other Surgeries     (artificial heart valve)      Stroke - Family Hx:  Grandparent      Heart - Family Hx:  Mother, Brother, Other (Niece)      Diabetes - Family Hx:  Mother, Brother, Sister, Other (Niece)      Cancer/Type - Family Hx:  Sister (Unknown), Child (thyroid cancer)      Other Family Medical History:  Other (Niece and nephew / asthma)      Is Father Still Living?:  No      Is Mother Still Living?:  No      Social History:  No Tobacco Use, No Alcohol Use, No Recreational Drug use      Smoking status:  Former smoker (smoker X40 yrs 1 PPD, Quit cant remeber)       Section:  No      Tubal Ligation:  Yes      Hysterectomy:  No      Anticoagulation Therapy:  Yes (Coumadin)      Antibiotic Prophylaxis:  No      Medical History:  Yes: Arthritis, Chemotherapy/Cancer (NON HODGKINS T CELL     LYMPHOMA IN ), Chronic Bronchitis/COPD, Congestive Heart Failu, Depression,     Anxiety, Diabetes, Seizures, Hemorrhoids/Rectal Prob (GERD, HIATAL HERNIA), High    Blood Pressure, Shortness Of Breath, Miscellaneous Medical/oth (ELEVATED     CHOLESTEROL ); No: Blood Disease, Deafness or Ringing Ears, Sinus Trouble      Psychiatric History      depression, anxiety            PREVENTION       Hx Influenza Vaccination:  Yes      Date Influenza Vaccine Given:  Oct 1, 2020      Influenza Vaccine Declined:  No      2 or More Falls in Past Year?:  No      Fall Past Year with Injury?:  No      Hx Pneumococcal Vaccination:  Yes      Encouraged to follow-up with:  PCP regarding preventative exams.      Chart initiated by      [facility]            ALLERGIES/MEDICATIONS      Allergies:        Coded Allergies:             AZITHROMYCIN (Verified  Allergy, Unknown, 12/10/20)           CLARITHROMYCIN (Verified  Allergy, Unknown, 12/10/20)           FEXOFENADINE (Verified  Allergy, Unknown, 12/10/20)           IODINATED CONTRAST MEDIA (Verified  Allergy, Unknown, 12/10/20)           LORATADINE (Verified  Allergy, Unknown, 12/10/20)           MACROLIDE ANTIBIOTICS (Verified  Allergy, Unknown, 12/10/20)           PENICILLINS (Verified  Allergy, Unknown, 12/10/20)           YELLOW DYE (Verified  Allergy, Unknown, 12/10/20)      Medications    Last Reconciled on 12/10/20 09:38 by ANGY SIDDIQUI,       Vitamin B Complex (B Complex-Vitamin B-12) 1 Each Tablet      1 TAB PO QDAY, #30 TAB         Reported         10/1/20       Methotrexate Sodium (Methotrexate) 2.5 Mg Tab      15 MG PO Sa, TAB         Reported         10/1/20       levETIRAcetam (levETIRAcetam) 500 Mg Tablet      500 MG PO BID, TAB         Reported         10/1/20       lamoTRIgine (LaMICtal) 100 Mg Tab      50 MG PO BID, #60 TAB 0 Refills         Reported         10/1/20       Lactulose (Lactulose*) 10 Gm/15 Ml Solution      30 ML PO HS, #900 ML 0 Refills         Reported         10/1/20       Cranberry Fruit (Cranberry) 400 Mg Tablet      500 MG PO QDAY for 30 Days, #38 TAB         Reported         10/1/20       Carbidopa/Levodopa/Entacapone 100 Mg (Carbidopa/Levodopa/Entacapone 100 Mg) 1     Tab Tablet      1 TAB PO QID, #120 TAB         Reported         10/1/20       Tiotropium Bromide (Spiriva Respimat 2.5 mcg/Puff) 4 Gm Mist.inhal      2  PUFFS INH RTQDAY, #1 MDI 11 Refills         Prov: ANNE MARIETOYADALTONTRISH SEN PA-C         9/2/20       Budesonide/Formoterol Fumarate (Symbicort 160/4.5 Mcg) 10.2 Gm Inh      2 PUFF INH BID, #1 INH 11 Refills         Prov: TOYA KENNEYTRISH SEN PA-C         9/2/20       Pantoprazole (Protonix) 40 Mg Tablet.dr      40 MG PO HS, #30 TAB 0 Refills         Reported         9/2/20       Losartan Potassium (Losartan*) 25 Mg Tablet      25 MG PO BID, #60 TAB 0 Refills         Prov: Char,Lj         7/28/20       ARIPiprazole (ABILIFY) 5 Mg Tab      5 MG PO QDAY, #30 TAB         Prov: Char,Lj         7/28/20       Metoprolol Tartrate (Metoprolol Tartrate) 25 Mg Tablet      25 MG PO BID for 30 Days, #60 TAB         Prov: Char,Lj         7/28/20       Escitalopram Oxalate (Escitalopram Oxalate*) 10 Mg Tablet      10 MG PO QDAY, #30 TAB         Prov: Char,Lj         7/28/20       Acetaminophen (ACETAMINOPHEN) 325 Mg Tablet      650 MG PO Q4H PRN for MILD PAIN (1-3)/FEVER, #100 TAB         Reported         6/17/20       Insulin Human Aspart (novoLOG FLEXPEN) 100 Unit/1 Ml Insuln.pen      3 UNITS SUBQ AC, #1 BOX 0 Refills         Reported         6/17/20       Insulin Glargine (Lantus VIAL) 100 Units/Ml Vial      24 UNITS SUBQ HS, #1 VIAL 0 Refills         Reported         6/17/20       Calcium Carb/Vit D3 (CALCIUM 600-VIT D3 400 TABLET) 1 Each Tablet      1 EACH PO QDAY, #60 TAB 0 Refills         Reported         6/17/20       Folic Acid (Folic Acid) 1 Mg Tablet      1 MG PO QDAY, #30 TAB 0 Refills         Reported         6/17/20       Levocetirizine (Levocetirizine) 5 Mg Tablet      5 MG PO QPM         Reported         5/13/20       Methotrexate Sodium (Methotrexate) 2.5 Mg Tab      15 MG PO QSATURDAY, TAB         Reported         11/18/19       Aspirin Chew (Aspirin Baby) 81 Mg Tab.chew      81 MG PO QDAY, #30 TAB.CHEW 0 Refills         Reported         12/27/18       MDI-Albuterol (Ventolin HFA) 18 Gm Hfa.aer.ad      2  PUFFS INH Q4H PRN for SHORTNESS OF BREATH         Reported         12/27/18      Current Medications      Current Medications Reviewed 12/10/20            EXAM      Vital Signs Reviewed      Gen: WDWN, Alert, NAD.        HEENT:  PERRL, EOMI.  OP, nares clear, no sinus tenderness.      Neck:  Supple, no JVD, no thyromegaly.      Lymph: No axillary, cervical, supraclavicular lymphadenopathy noted bilaterally.      Chest: Lungs clear to auscultation bilaterally, no wheezes, rales or rhonchi,     normal work of breathing noted, patient able to speak full sentences without     difficulty.       CV:  RRR, no MGR, pulses 2+, equal.      Abd:  Soft, NT, ND, + BS, no HSM.      EXT:  No clubbing, no cyanosis, no edema, no joint tenderness.       Neuro:  A  Skin: No rashes or lesions.      Vitals      Vitals:             Height 4 ft 11.00 in / 149.86 cm           Weight 170 lbs  / 77.327414 kg           BSA 1.72 m2           BMI 34.3 kg/m2           Temperature 97.8 F / 36.56 C - Temporal           Pulse 74           Respirations 15           Blood Pressure 140/60 Sitting, Right Arm           Pulse Oximetry 96%, room air            REVIEW      Results Reviewed      PCCS Results Reviewed?:  Yes Prev Lab Results, Yes Prev Radiology Results, Yes     Previous [facility] Records      Lab Results      I personally reviewed Dr. Randhawa's last office note.            Assessment      ASSESSMENT:      1. 1 cm right lower lobe pleural based nodule, non PET avid.      2. Paratracheal lymphadenopathy not PET avid.       3. History of non Hodgkin's lymphoma.       4. Chronic obstructive pulmonary disease without acute exacerbation.       5. COVID-19 diagnosis at in October 2020.      6. Chronic hypoxic respiratory failure on supplemental oxygen.       7. History of acute hypoxic and hypercapneic respiratory failure requiring     intubation and mechanical ventilation.       8. Obstructive sleep apnea needs to be set up with CPAP machine.       9.  History of intracranial hemorrhage followed by neurosurgery.       10. 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             PLAN:      1. Continue Symbicort and spiriva everyday as prescribed and rinse her mouth     after each use.       2. The patient's medical assistant is advised to have a copy of the patient's     updated medicine list faxed to our office.       3. Continue albuterol inhaler as needed.       4. Continue supplemental oxygen to keep oxygen saturation at or above 89%.       5. I will have our clinical coordinator Donaldo set the patient up with her CPAP     machine and have oxygen bled into it at night.  The patient is advised to clean     mask and tubing daily.      6. Follow up with ENT as scheduled.       7. Follow up with neurosurgery as scheduled.       8. The patient is advised to call the office, call 911 or go to the ER for any     new or worsening symptoms.       9. The patient is already scheduled to have a follow up CT scan of the chest in     April 2021.       10. The patient reports she is up to date with  flu, Prevnar and Pneumovax.                                                                                                                                           11. I will request a copy of the patient's hospital stay records from [place].       12. Follow up in 2 months to review CPAP compliance, sooner if needed.            Patient Education      Patient Education Provided:  COPD      Time Spent:  > 50% /Coord Care            Electronically signed by ANGY [facility]IDDIQUI [facility]  12/16/2020 11:41       Disclaimer: Converted document may not contain table formatting or lab diagrams. Please see [employer] for the authenticated document.

## 2021-05-28 NOTE — PROGRESS NOTES
Patient: JULIANE BEDOLLA     [id number]     Report: #[id number]  UNIT #: [id number]     : 1955    Encounter Date:2020  PRIMARY CARE: David Donaldson  ***Signed***  --------------------------------------------------------------------------------------------------------------------  Chief Complaint      Encounter Date      Sep 2, 2020            Primary Care Provider      BRISEYDA LEYVA            Patient Complaint      Patient is complaining of      PT here today for [facility] F/U, Respiratory Failure            VITALS      Temperature 98.0 F / 36.67 C - Temporal      Pulse 59      Respirations 15      Blood Pressure 126/60 Sitting, Right Arm      Pulse Oximetry 98%, nasal canula , 2 lpm            HPI      The patient is a 64 year old white female recently hospitalized at [facility] seen by Dr. Blue and Dr. Buck. She presented from a    nursing facility on 2020. She was initially unresponsive, intubated and     sedated, found to have a small intracranial hemorrhage. She was also found to     have seizures and seen by neurology, neurosurgery and cardiology. She reports a     long standing heavy smoking history as she smoked 2 pack per day for 40 years.     She reports a prior history of chronic obstructive pulmonary disease but moved     here from [place] a couple years ago and has not had anyone following her for     her chronic obstructive pulmonary disease since. She has been on supplemental     oxygen longstanding. Previously she was on Symbicort 160 and Spiriva Respimat     2.5 mcg and felt they helped her but they were discontinued for reasons that are    unclear to her. She is currently not using any inhalers or breathing treatments.    She complains of some exertional dyspnea and at times dry cough. She denies     wheezing, hemoptysis, fever or chills. She feels she is recovering somewhat     since her hospital discharge.             I reviewed the Review of  Systems, medical, surgical and family history and agree    with those as entered.      Copies To:   ESTHER STEELE      Constitutional:  Denies: Fatigue, Fever, Weight gain, Weight loss, Chills,     Insomnia, Other      Respiratory/Breathing:  Complains of: Shortness of air; Denies: Wheezing, Cough,    Hemoptysis, Pleuritic pain, Other      Endocrine:  Denies: Polydipsia, Polyuria, Heat/cold intolerance, Abnorml     menstrual pattern, Diabetes, Other      Eyes:  Denies: Blurred vision, Vision Changes, Other      Ears, nose, mouth, throat:  Denies: Mouth lesions, Thrush, Throat pain,     Hoarseness, Allergies/Hay Fever, Post Nasal Drip, Headaches, Recent Head Injury,    Nose Bleeding, Neck Stiffness, Thyroid Mass, Hearing Loss, Ear Fullness, Dry Mo    uth, Nasal or Sinus Pain, Dry Lips, Nasal discharge, Nasal congestion, Other      Cardiovascular:  Complains of: Chest Pain; Denies: Palpitations, Syncope,     Claudication, Wake up Gasping for air, Leg Swelling, Irregular Heart Rate,     Cyanosis, Dyspnea on Exertion, Other      Gastrointestinal:  Denies: Nausea, Constipation, Diarrhea, Abdominal pain, Vomi    ting, Difficulty Swallowing, Reflux/Heartburn, Dysphagia, Jaundice, Bloating,     Melena, Bloody stools, Other      Genitourinary:  Denies: Urinary frequency, Incontinence, Hematuria, Urgency,     Nocturia, Dysuria, Testicular problems, Other      Musculoskeletal:  Denies: Joint Pain, Joint Stiffness, Joint Swelling, Myalgias,    Other      Hematologic/lymphatic:  DENIES: Lymphadenopathy, Bruising, Bleeding tendencies,     Other      Neurological:  Denies: Headache, Numbness, Weakness, Seizures, Other      Psychiatric:  Denies: Anxiety, Appropriate Effect, Depression, Other      Sleep:  No: Excessive daytime sleep, Morning Headache?, Snoring, Insomnia?, Stop    breathing at sleep?, Other      Integumentary:  Denies: Rash, Dry skin, Skin Warm to Touch, Other      Immunologic/Allergic:  Denies: Latex  allergy, Seasonal allergies, Asthma,     Urticaria, Eczema, Other      Immunization status:  No: Up to date            FAMILY/SOCIAL/MEDICAL HX      Surgical History:  Yes: Bladder Surgery (KIDNEY TUMOR )      Smoking status:  Former smoker (smoker X40 yrs 1 PPD, Quit cant remeber )      Anticoagulation Therapy:  No      Antibiotic Prophylaxis:  No      Medical History:  Yes: Arthritis, Chemotherapy/Cancer (NON HODGKINS T CELL     LYMPHOMA IN 2003), Chronic Bronchitis/COPD, Congestive Heart Failu, Diabetes,     Seizures, Hemorrhoids/Rectal Prob (GERD, HIATAL HERNIA), High Blood Pressure,     Shortness Of Breath, Miscellaneous Medical/oth (ELEVATED CHOLESTEROL ); No:     Blood Disease, Deafness or Ringing Ears      Psychiatric History      none            PREVENTION      Hx Influenza Vaccination:  Yes      Date Influenza Vaccine Given:  Sep 1, 2019      Influenza Vaccine Declined:  No      2 or More Falls in Past Year?:  No      Fall Past Year with Injury?:  No      Hx Pneumococcal Vaccination:  Yes      Encouraged to follow-up with:  PCP regarding preventative exams.      Chart initiated by      Christi Lockwood CMA            ALLERGIES/MEDICATIONS      Allergies:        Coded Allergies:             AZITHROMYCIN (Verified  Allergy, Unknown, 7/19/20)           CLARITHROMYCIN (Verified  Allergy, Unknown, 7/19/20)           FEXOFENADINE (Verified  Allergy, Unknown, 7/19/20)           IODINATED CONTRAST MEDIA (Verified  Allergy, Unknown, 7/19/20)           LORATADINE (Verified  Allergy, Unknown, 7/19/20)           MACROLIDE ANTIBIOTICS (Verified  Allergy, Unknown, 7/19/20)           PENICILLINS (Verified  Allergy, Unknown, 7/19/20)           YELLOW DYE (Verified  Allergy, Unknown, 7/19/20)      Medications    Last Reconciled on 9/2/20 15:58 by JANEE MONTALVO      Pantoprazole (Protonix) 40 Mg Tablet.dr      40 MG PO HS, #30 TAB 0 Refills         Reported         9/2/20       Losartan Potassium (Losartan*) 25 Mg  Tablet      25 MG PO BID, #60 TAB 0 Refills         Prov: Char,Lj         7/28/20       Cefuroxime Axetil (Cefuroxime) 500 Mg Tablet      500 MG PO BID for 5 Days, #10 TAB         Prov: Char,Lj         7/28/20       ARIPiprazole (ABILIFY) 5 Mg Tab      5 MG PO QDAY, #30 TAB         Prov: Char,Lj         7/28/20       Metoprolol Tartrate (Metoprolol Tartrate) 25 Mg Tablet      25 MG PO BID for 30 Days, #60 TAB         Prov: Char,Lj         7/28/20       lamoTRIgine ODT (LaMICtal ODT) 25 Mg Tablet      25 MG PO BID for 30 Days, #60 TAB         Prov: Char,Lj         7/28/20       levETIRAcetam (levETIRAcetam) 500 Mg Tablet      500 MG PO BID for 30 Days, #60 TAB         Prov: Char,Lj         7/28/20       Escitalopram Oxalate (Escitalopram Oxalate*) 10 Mg Tablet      10 MG PO QDAY, #30 TAB         Prov: Char,Lj         7/28/20       Warfarin Sod (Coumadin*) 5 Mg Tablet      5 MG PO QDAY@16, #30 TAB 0 Refills         Reported         7/19/20       Acetaminophen (ACETAMINOPHEN) 325 Mg Tablet      650 MG PO Q4H PRN for MILD PAIN (1-3)/FEVER, #100 TAB         Reported         6/17/20       Insulin Human Aspart (novoLOG FLEXPEN) 100 Unit/1 Ml Insuln.pen      3 UNITS SUBQ AC, #1 BOX 0 Refills         Reported         6/17/20       Insulin Glargine (Lantus VIAL) 100 Units/Ml Vial      24 UNITS SUBQ HS, #1 VIAL 0 Refills         Reported         6/17/20       Omeprazole (Omeprazole*) 20 Mg Tablet.dr      20 MG PO QDAY, #30 CAP 0 Refills         Reported         6/17/20       Calcium Carb/Vit D3 (CALCIUM 600-VIT D3 400 TABLET) 1 Each Tablet      1 EACH PO QDAY, #60 TAB 0 Refills         Reported         6/17/20       Folic Acid (Folic Acid) 1 Mg Tablet      1 MG PO QDAY, #30 TAB 0 Refills         Reported         6/17/20       Insulin Lispro (HumaLOG VIAL) 100 Units/Ml Vial      0 SUBQ ACHS, #1 VIAL 0 Refills         Reported         6/17/20       Diclofenac Sodium (Voltaren 1%*) 100 Gm Gel..gram.       1 APL TOPICAL QID         Reported         5/13/20       Levocetirizine (Levocetirizine) 5 Mg Tablet      5 MG PO QPM         Reported         5/13/20       Folic Acid/Vit Bcomp,C (Super B-Complex Folic-Vit C Tb) 400 Mcg Tablet      1 TAB PO QDAY, TAB         Reported         11/18/19       Methotrexate Sodium (Methotrexate) 2.5 Mg Tab      15 MG PO QSATURDAY, TAB         Reported         11/18/19       Aspirin Chew (Aspirin Baby) 81 Mg Tab.chew      81 MG PO QDAY, #30 TAB.CHEW 0 Refills         Reported         12/27/18       MDI-Albuterol (Ventolin HFA) 18 Gm Hfa.aer.ad      2 PUFFS INH Q4H PRN for SHORTNESS OF BREATH         Reported         12/27/18      Current Medications      Current Medications Reviewed 9/2/20            EXAM      Vital Signs Reviewed      Gen: WDWN, Alert, NAD.        HEENT:  PERRL, EOMI.  OP, nares clear, no sinus tenderness.      Neck:  Supple, no JVD, no thyromegaly.      Lymph: No axillary, cervical, supraclavicular lymphadenopathy noted bilaterally.      Chest: Mildly decreased breath sounds throughout, no wheezes, rhonchi or     crackles, normal work of breathing noted.        CV:  RRR, no MGR, pulses 2+, equal.      Abd:  Soft, NT, ND, + BS, no HSM.      EXT:  No clubbing, no cyanosis, no edema, no joint tenderness.       Neuro:  A  Skin: No rashes or lesions.      Vtials      Vitals:             Temperature 98.0 F / 36.67 C - Temporal           Pulse 59           Respirations 15           Blood Pressure 126/60 Sitting, Right Arm           Pulse Oximetry 98%, nasal canula , 2 lpm            REVIEW      Results Reviewed      PCCS Results Reviewed?:  Yes Prev Lab Results, Yes Prev Radiology Results, Yes     Previous [facility] Records      Lab Results      I personally reviewed the patient's recent hospital records.            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      * Budesonide/Formoterol Fumarate (Symbicort 160/4.5 Mcg) 10.2 GM INH: 2 PUFF INH      BID #1         Dx: Obstructive sleep apnea - G47.33      * TIOTROPIUM BROMIDE (Spiriva Respimat 2.5 mcg/Puff) 4 GM MIST.INHAL: 2 PUFFS       INH RTQDAY #1         Dx: Obstructive sleep apnea - G47.33      * MDI-Albuterol (Proair HFA) 8.5 GM HFA.AER.AD: 1 PUFFS INH Q4-6H PRN SHORTNESS       OF BREATH #1         Dx: Obstructive sleep apnea - G47.33      New Diagnostics      * Split Night Sleep Study, Week         Dx: Obstructive sleep apnea - G47.33      * PFT-Comp, PrePost,DLCO,BodyBox, Month         Dx: Obstructive sleep apnea - G47.33      * Alpha 1 Antitrypsin , Routine         Dx: Obstructive sleep apnea - G47.33      * Low Dose Chest CT, SCHEDULED PROCEDURE         Dx: Tobacco abuse, 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 Chronic obstructive pulmonary disease severity currently unknown without     acute exacerbation.       2. Former heavy tobacco abuse of cigarettes with up to 80 pack year history in     remission for 2 months.       3. Chronic hypoxic respiratory failure requiring supplemental oxygen.       4. Recent acute hypoxic and hypercapneic respiratory failure requiring     intubation and mechanical ventilation.       5. Recent hypertensive emergency.       6. Chronic kidney disease stage III.       7. Obstructive sleep apnea was on home CPAP now no longer has CPAP.       8. Recent acute intracranial hemorrhage followed by neurosurgery.            PLAN:      1. I have discussed with the patient regarding her recent hospitalization,     recommendations and plans going forward.         2. We will get baseline pulmonary function test now and given her heavy smoking     history she also qualifies for annual  low dose CT scan of the chest. Shared     decision making was done and I have enrolled her in this program.       3. I will do alpha 1 antitrypsin testing today.       4. I will restart her on Symbicort 160 and Spiriva Respimat 2.5 mcg and     albuterol as needed.       5. Continue to follow up with neurology and neurosurgery as scheduled.       6. Continue supplemental oxygen to keep oxygen saturation at or above 89%.       7. I will do a split night  sleep study now to see if she still requires CPAP.     If she does, this will be set up for her.       8. Follow up in 2-3 months to review test results with Dr. Buck or Dr. Blue. Vaccination status can be addressed at that time.            Patient Education      Patient Education Provided:  COPD, How to use an Inhaler      Time Spent:  > 50% /[facility]            Electronically signed by DALTON KENNEY PA-C  09/08/2020 13:20       Disclaimer: Converted document may not contain table formatting or lab diagrams. Please see [employer] for the authenticated document.

## 2021-05-28 NOTE — PROGRESS NOTES
Patient: JULIANE BEDOLLA     [id number]     Report: #[id number]  UNIT #: [id number]     : 1955    Encounter Date:10/01/2020  PRIMARY CARE: David Donaldson  ***Signed***  --------------------------------------------------------------------------------------------------------------------  Chief Complaint      Encounter Date      Oct 1, 2020            Primary Care Provider      BRISEYDA LEYVA            Referring Provider      BRISEYDA LEYVA            Patient Complaint      Patient is complaining of      Pt here for follow up/PET SCAN results/Solitary Pulmonary Nodule/COPD            VITALS      Height 4 ft 11 in / 149.86 cm      Weight 170 lbs 6 oz / 77.894450 kg      BSA 1.72 m2      BMI 34.4 kg/m2      Temperature 97.5 F / 36.39 C - Tympanic      Pulse 65      Respirations 12      Blood Pressure 183/59 Sitting, Left Arm      Pulse Oximetry 96%, nasal cannula, 2 lpm            HPI      64-year-old nursing home patient with chronic hypoxemic respiratory failure,     obstructive sleep apnea on nightly CPAP and history of non-Hodgkin's lymphoma     presents for follow-up on lung nodule.  She had a PET scan performed for a 1 cm     pleural-based nodule in the right lower lobe that resulted no FDG uptake.      Additionally had a 2 cm paratracheal lymph node that did not have FDG uptake.      She had mild uptake in the left hilum but no discrete mass or lymphadenopathy     appreciated.  The patient is complaining today of her ears bothering her.  She     says that they itch and thick gooey substance comes out.  She is scheduled to     see an ear nose and throat physician at the end of October.  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  She denies difficulty breathing but is     sedentary most of the time.  She does wear 2 L of oxygen 24/7 and a CPAP     nightly.            ROS      Constitutional:  Denies: Fatigue, Fever, Weight gain, Weight loss, Chills, I    nsomnia, Other       Respiratory/Breathing:  Complains of: Shortness of air, Cough; Denies: Wheezing,    Hemoptysis, Pleuritic pain, Other      Endocrine:  Complains of: Diabetes; Denies: Polydipsia, Polyuria, Heat/cold     intolerance, Abnorml menstrual pattern, Other      Eyes:  Complains of: Blurred vision; Denies: Vision Changes, Other      Ears, nose, mouth, throat:  Complains of: Nasal Discharge, Other (Ear changes as    per HPI); Denies: Congestion, Dysphagia, Hearing Changes, Nose Bleeding, Throat     pain, Tinnitus      Cardiovascular:  Complains of: Chest Pain, Exertional dyspnea; Denies:     Peripheral Edema, Palpitations, Syncope, Wake up Gasping for air, Orthopnea,     Tachycardia, Other      Gastrointestinal:  Denies: Abdominal pain/cramping, Bloody stools, Constipation,    Diarrhea, Melena, Nausea, Vomiting, Other      Genitourinary:  Denies: Dysuria, Urinary frequency, Incontinence, Hematuria,     Urgency, Other      Musculoskeletal:  Complains of: Joint Pain (Arthritisa in hands), Joint     Stiffness; Denies: Joint Swelling, Myalgias, Other      Hematologic/lymphatic:  DENIES: Lymphadenopathy, Bruising, Bleeding tendencies,     Other      Neurologic:  Complains of: Seizures; Denies: Headache, Numbness, Weakness, Other      Psychiatric:  Complains of: Anxiety, Depression; Denies: Appropriate Effect,     Other      Sleep:  No: Excessive daytime sleep, Morning Headache?, Snoring, Insomnia?, Stop    breathing at sleep?, Other      Integumentary:  Denies: Rash, Dry skin, Skin Warm to Touch, Other            FAMILY/SOCIAL/MEDICAL HX      Surgical History:  Yes: Bladder Surgery (KIDNEY TUMOR ), Other Surgeries     (artificial heart valve)      Stroke - Family Hx:  Grandparent      Heart - Family Hx:  Mother, Brother, Other (Niece)      Diabetes - Family Hx:  Mother, Brother, Sister, Other (Niece)      Cancer/Type - Family Hx:  Sister (Unknown), Child (thyroid cancer)      Other Family Medical History:  Other (Niece and  nephew / asthma)      Is Father Still Living?:  No      Is Mother Still Living?:  No      Social History:  No Tobacco Use, No Alcohol Use, No Recreational Drug use      Smoking status:  Former smoker (smoker X40 yrs 1 PPD, Quit cant remeber)       Section:  No      Tubal Ligation:  Yes      Hysterectomy:  No      Anticoagulation Therapy:  Yes (Coumadin)      Antibiotic Prophylaxis:  No      Medical History:  Yes: Arthritis, Chemotherapy/Cancer (NON HODGKINS T CELL     LYMPHOMA IN ), Chronic Bronchitis/COPD, Congestive Heart Failu, Depression,     Anxiety, Diabetes, Seizures, Hemorrhoids/Rectal Prob (GERD, HIATAL HERNIA), High    Blood Pressure, Shortness Of Breath, Miscellaneous Medical/oth (ELEVATED     CHOLESTEROL ); No: Blood Disease, Deafness or Ringing Ears, Sinus Trouble      Psychiatric History      Depression/anxiety            PREVENTION      Hx Influenza Vaccination:  Yes      Date Influenza Vaccine Given:  Oct 1, 2020      Influenza Vaccine Declined:  No      2 or More Falls in Past Year?:  No      Fall Past Year with Injury?:  No      Hx Pneumococcal Vaccination:  Yes      Encouraged to follow-up with:  PCP regarding preventative exams.      Chart initiated by      Jesica De Luna MA            ALLERGIES/MEDICATIONS      Allergies:        Coded Allergies:             AZITHROMYCIN (Verified  Allergy, Unknown, 10/1/20)           CLARITHROMYCIN (Verified  Allergy, Unknown, 10/1/20)           FEXOFENADINE (Verified  Allergy, Unknown, 10/1/20)           IODINATED CONTRAST MEDIA (Verified  Allergy, Unknown, 10/1/20)           LORATADINE (Verified  Allergy, Unknown, 10/1/20)           MACROLIDE ANTIBIOTICS (Verified  Allergy, Unknown, 10/1/20)           PENICILLINS (Verified  Allergy, Unknown, 10/1/20)           YELLOW DYE (Verified  Allergy, Unknown, 10/1/20)      Medications    Last Reconciled on 10/1/20 17:00 by PAULO TOLLIVER,       Vitamin B Complex (B Complex-Vitamin B-12) 1 Each Tablet      1  TAB PO QDAY, #30 TAB         Reported         10/1/20       Methotrexate Sodium (Methotrexate) 2.5 Mg Tab      15 MG PO Sa, TAB         Reported         10/1/20       levETIRAcetam (levETIRAcetam) 500 Mg Tablet      500 MG PO BID, TAB         Reported         10/1/20       lamoTRIgine (LaMICtal) 100 Mg Tab      50 MG PO BID, #60 TAB 0 Refills         Reported         10/1/20       Lactulose (Lactulose*) 10 Gm/15 Ml Solution      30 ML PO HS, #900 ML 0 Refills         Reported         10/1/20       Cranberry Fruit (Cranberry) 400 Mg Tablet      500 MG PO QDAY for 30 Days, #38 TAB         Reported         10/1/20       Carbidopa/Levodopa/Entacapone 100 Mg (Carbidopa/Levodopa/Entacapone 100 Mg) 1     Tab Tablet      1 TAB PO QID, #120 TAB         Reported         10/1/20       Tiotropium Bromide (Spiriva Respimat 2.5 mcg/Puff) 4 Gm Mist.inhal      2 PUFFS INH RTQDAY, #1 MDI 11 Refills         Prov: DALTON KENNEY PA-C         9/2/20       Budesonide/Formoterol Fumarate (Symbicort 160/4.5 Mcg) 10.2 Gm Inh      2 PUFF INH BID, #1 INH 11 Refills         Prov: DALTON KENNEY PA-C         9/2/20       Pantoprazole (Protonix) 40 Mg Tablet.dr      40 MG PO HS, #30 TAB 0 Refills         Reported         9/2/20       Losartan Potassium (Losartan*) 25 Mg Tablet      25 MG PO BID, #60 TAB 0 Refills         Prov: Char,Lj         7/28/20       ARIPiprazole (ABILIFY) 5 Mg Tab      5 MG PO QDAY, #30 TAB         Prov: Char,Lj         7/28/20       Metoprolol Tartrate (Metoprolol Tartrate) 25 Mg Tablet      25 MG PO BID for 30 Days, #60 TAB         Prov: Char,Lj         7/28/20       Escitalopram Oxalate (Escitalopram Oxalate*) 10 Mg Tablet      10 MG PO QDAY, #30 TAB         Prov: Char,Lj         7/28/20       Acetaminophen (ACETAMINOPHEN) 325 Mg Tablet      650 MG PO Q4H PRN for MILD PAIN (1-3)/FEVER, #100 TAB         Reported         6/17/20       Insulin Human Aspart (novoLOG FLEXPEN) 100 Unit/1 Ml  Insuln.pen      3 UNITS SUBQ AC, #1 BOX 0 Refills         Reported         6/17/20       Insulin Glargine (Lantus VIAL) 100 Units/Ml Vial      24 UNITS SUBQ HS, #1 VIAL 0 Refills         Reported         6/17/20       Calcium Carb/Vit D3 (CALCIUM 600-VIT D3 400 TABLET) 1 Each Tablet      1 EACH PO QDAY, #60 TAB 0 Refills         Reported         6/17/20       Folic Acid (Folic Acid) 1 Mg Tablet      1 MG PO QDAY, #30 TAB 0 Refills         Reported         6/17/20       Levocetirizine (Levocetirizine) 5 Mg Tablet      5 MG PO QPM         Reported         5/13/20       Methotrexate Sodium (Methotrexate) 2.5 Mg Tab      15 MG PO QSATURDAY, TAB         Reported         11/18/19       Aspirin Chew (Aspirin Baby) 81 Mg Tab.chew      81 MG PO QDAY, #30 TAB.CHEW 0 Refills         Reported         12/27/18       MDI-Albuterol (Ventolin HFA) 18 Gm Hfa.aer.ad      2 PUFFS INH Q4H PRN for SHORTNESS OF BREATH         Reported         12/27/18      Current Medications      Current Medications Reviewed 10/1/20            EXAM      Vital Signs Reviewed      Appears chronically ill, older than stated age.  Seated in wheelchair on     supplemental oxygen      HEENT:  PERRL, EOMI. bilateral ear canals are dry with flaky skin.  The left     tympanic membrane is scarred down and white opacification.  The right tympanic     membrane is without erythema or bulging.  There is erythema at the distal part     of the external auditory canal.      Neck: Short thick neck, supple, no JVD, no thyromegaly      Lymph: no axillary, cervical, supraclavicular lymphadenopathy noted bilaterally      Chest: good aeration, bibasilar rhonchi but otherwise clear to auscultation     without wheezes or rales.  Tympanic to percussion bilaterally, no work of     breathing noted      CV: RRR, no MGR, pulses 2+, equal.      Abd:  Soft, NT, ND, + BS, no HSM      EXT:  no clubbing, no cyanosis, no edema, no joint tenderness      Neuro:  A  Skin: No rashes or  lesions noted      Vitals      Vitals:             Height 4 ft 11 in / 149.86 cm           Weight 170 lbs 6 oz / 77.795886 kg           BSA 1.72 m2           BMI 34.4 kg/m2           Temperature 97.5 F / 36.39 C - Tympanic           Pulse 65           Respirations 12           Blood Pressure 183/59 Sitting, Left Arm           Pulse Oximetry 96%, nasal cannula, 2 lpm            REVIEW      Results Reviewed      PCCS Results Reviewed?:  Yes Prev Radiology Results, Yes Previous [facility] Records      Radiographic Results      [facility]                PACS RADIOLOGY REPORT            Patient: JULIANE BEDOLLA   Acct: #[id number]   Report: #[id number]            UNIT #: [id number]    DOS: 20       INSURANCE:[employer] SOLUTION   ORDER #:[id number]      LOCATION:PET     : 1955            PROVIDERS      ADMITTING:     ATTENDING: PAULO TOLLIVER      FAMILY:  NONE,MD   ORDERING:  PAULO TOLLIVER         OTHER:    DICTATING:  Ayden Meyer MD            REQ #:[id number]   EXAM:[id number] - SKULL BASE-MIDTHIGH INIT fdg      REASON FOR EXAM:  R91.1      REASON FOR VISIT:  R91.1            *******Signed******         PROCEDURE:   PET CT SKULL BASE TO MID THIGH INITIAL             COMPARISON:   [facility], CT, CT LOW DOSE CHEST SCREENING,    2020, 13:12.        INDICATIONS:   SPN             TECHNIQUE:   After obtaining the patient's consent, F-18 FDG was administered     intravenously.  A PET       scan with concurrent CT imaging was performed from the skull to the thigh with     multiplanar imaging.             RADIONUCLIDE:     10.24 MCI   F18 FDG- I.V.      LABS:                          Blood Glucose 133 mg/dl      UPTAKE TIME:        56 mins      BACKGROUND UPTAKE:  Mediastinal background 2.5 SUV.  Hepatic background 3.1 SUV.             FINDINGS:         Physiologic uptake is seen in the head and neck.  No abnormal  uptake is seen in     thyroid goiter.             1 cm pleural-based nodule in the medial right lower lobe is not hypermetabolic.     Mild ill-defined       uptake in the right mid hilar region is not associated with a distinct mass.      SUV max 2.8.  2 cm       pretracheal lymph node is not hypermetabolic.             No abnormal uptake is seen in the abdomen and pelvis.  No abnormal skeletal     uptake is evident.             CONCLUSION:          PET-CT demonstrating no abnormal uptake in 1 cm pleural-based nodule in medial     right lower lobe       and in a 2 cm pretracheal lymph node.             Follow-up CT scan of the chest is recommended in 6 months.              LIBERTY MCCLELLAND MD             Electronically Signed and Approved By: LIBERTY MCCLELLAND MD on 9/30/2020 at 13:40                                  Until signed, this is an unconfirmed preliminary report that may contain      errors and is subject to change.                                              COUST:      D:09/30/20 1340            Assessment      Notes      New Medications      * Carbidopa/Levodopa/Entacapone 100 Mg 1 TAB TABLET: 1 TAB PO QID #120      * Cranberry Fruit (Cranberry) 400 MG TABLET: 500 MG PO QDAY 30 Days #38      * Lactulose (Lactulose*) 10 GM/15 ML SOLUTION: 30 ML PO HS #900      * lamoTRIgine (LaMICtal) 100 MG TAB: 50 MG PO BID #60      * levETIRAcetam 500 MG TABLET: 500 MG PO BID      * Methotrexate Sodium (Methotrexate) 2.5 MG TAB: 15 MG PO Sa      * VITAMIN B COMPLEX (B Complex-Vitamin B-12) 1 EACH TABLET: 1 TAB PO QDAY #30      Discontinued Medications      * levETIRAcetam 500 MG TABLET: 500 MG PO BID 30 Days #60      * lamoTRIgine ODT (LaMICtal ODT) 25 MG TABLET: 25 MG PO BID 30 Days #60      * Cefuroxime Axetil (Cefuroxime) 500 MG TABLET: 500 MG PO BID 5 Days #10      * MDI-Albuterol (Proair HFA) 8.5 GM HFA.AER.AD: 1 PUFFS INH Q4-6H PRN SHORTNESS       OF BREATH #1         Dx: Obstructive sleep apnea - G47.33      New  Diagnostics      * Chest W/O Cont CT, SCHEDULED PROCEDURE         Dx: COPD (chronic obstructive pulmonary disease) - J44.9      New Office Procedures      * Fluzone Vaccine High-Dose, As Soon As Possible         Flu Vacc (65Yr Up)/Pf (Fluzone High-Dose Syr) 180 MCG/0.5 ML SYRINGE: 180        MICROGRAM INTRAMUSCULARLY Qty 1 SYRINGE      ASSESSMENT:      1. 1 cm RLL pleural based nodule, non-PET avid      2. Paratracheal lymphadenopathy, non-PET avid      3. History of Non-Hodgkins lymphoma      4. Chronic obstructive pulmonary disease without acute exacerbation.       5. Former heavy tobacco abuse of cigarettes with up to 80 pack year history in     remission      6. Chronic hypoxic respiratory failure requiring supplemental oxygen.       7.History of acute hypoxic and hypercapneic respiratory failure requiring i    ntubation and mechanical ventilation.       8. Obstructive sleep apnea was on home CPAP now no longer has CPAP.       9. Recent acute intracranial hemorrhage followed by neurosurgery.            PLAN:      1.  I personally reviewed the patient's PET scan and reviewed it with her today.     Recommend six-month follow-up chest CT.      2.  Continue supplemental oxygen and CPAP.        3.  Continue current inhalers      4.  Follow-up with ENT as scheduled      5.  A Fluzone vaccine was given to the patient today in our office.  She is     up-to-date on Prevnar and Pneumovax            Follow-up in our office in 6 months time            Patient Education      ACO BMI High above 25:  Counseling Given      Patient Education Provided:  Influenza, Lung Cancer            Electronically signed by PAULO TOLLIVER  10/01/2020 17:00       Disclaimer: Converted document may not contain table formatting or lab diagrams. Please see [employer] for the authenticated document.

## 2021-05-28 NOTE — PROGRESS NOTES
Patient: JULIANE BEDOLLA     [id number]     Report: #[id number]  UNIT #: [id number]     : 1955    Encounter Date:2021  PRIMARY CARE: David Donaldson  ***Signed***  --------------------------------------------------------------------------------------------------------------------  Chief Complaint      Encounter Date      2021            Primary Care Provider            Duncan rosales            Referring Provider      BRISEYDA LEYVA            Patient Complaint      Patient is complaining of      2 month f/u            VITALS      Height 4 ft 11 in / 149.86 cm      Weight 170 lbs  / 77.742234 kg      BSA 1.72 m2      BMI 34.3 kg/m2      Temperature 96.7 F / 35.94 C - Tympanic      Pulse 84      Respirations 16      Blood Pressure 150/106 Sitting, Left Arm      Pulse Oximetry 94%, room air            HPI      The patient is a 65 year old  female with solitary lung nodule and COPD    here for follow up. She is living in a nursing home lifelong after having intr    acerebral bleed.  She needs assistance with ADLs and is essentially wheelchair     bound.  She denies any dyspnea, coughing, wheezing, headaches, chest pain,     weight loss or hemoptysis. She did have a CT and PET scan done last fall which     showed a PET negative solitary lung nodule and right hilar lymph node.  She is     due for six month chest CT next month.  Otherwise is doing.  She is able to     perform her ADLs without difficulty. Denies any swollen glands or lymph nodes of    the head and neck.  She wears 2 liters of oxygen during the day with activity     and wears a CPAP machine at night.  She denies any snoring or excessive daytime     sleepiness.            I have personally reviewed the review of systems, past family, social, surgical     and medical histories and I agree with the findings.            ROS      Constitutional:  Complains of: Fatigue; Denies: Fever, Weight gain, Weight loss,    Chills,  Insomnia, Other      Respiratory/Breathing:  Denies: Shortness of air, Wheezing, Cough, Hemoptysis,     Pleuritic pain, Other      Endocrine:  Denies: Polydipsia, Polyuria, Heat/cold intolerance, Abnorml     menstrual pattern, Diabetes, Other      Eyes:  Denies: Blurred vision, Vision Changes, Other      Ears, nose, mouth, throat:  Denies: Congestion, Dysphagia, Hearing Changes, Nose    Bleeding, Nasal Discharge, Throat pain, Tinnitus, Other      Cardiovascular:  Denies: Chest Pain, Exertional dyspnea, Peripheral Edema,     Palpitations, Syncope, Wake up Gasping for air, Orthopnea, Tachycardia, Other      Gastrointestinal:  Denies: Abdominal pain/cramping, Bloody stools, Constipation,    Diarrhea, Melena, Nausea, Vomiting, Other      Genitourinary:  Denies: Dysuria, Urinary frequency, Incontinence, Hematuria,     Urgency, Other      Musculoskeletal:  Denies: Joint Pain, Joint Stiffness, Joint Swelling, Myalgias,    Other      Hematologic/lymphatic:  DENIES: Lymphadenopathy, Bruising, Bleeding tendencies,     Other      Neurologic:  Complains of: Headache; Denies: Numbness, Weakness, Seizures, Other      Psychiatric:  Denies: Anxiety, Appropriate Effect, Depression, Other      Sleep:  No: Excessive daytime sleep, Morning Headache?, Snoring, Insomnia?, Stop    breathing at sleep?, Other      Integumentary:  Denies: Rash, Dry skin, Skin Warm to Touch, Other            FAMILY/SOCIAL/MEDICAL HX      Surgical History:  Yes: Bladder Surgery (KIDNEY TUMOR ), Other Surgeries     (artificial heart valve)      Stroke - Family Hx:  Grandparent      Heart - Family Hx:  Mother, Brother, Other (Niece)      Diabetes - Family Hx:  Mother, Brother, Sister, Other (Niece)      Cancer/Type - Family Hx:  Sister (Unknown), Child (thyroid cancer)      Other Family Medical History:  Other (Niece and nephew / asthma)      Is Father Still Living?:  No      Is Mother Still Living?:  No      Social History:  No Tobacco Use, No Alcohol Use, No  Recreational Drug use      Smoking status:  Former smoker (smoker X40 yrs 1 PPD, Quit cant remeber)       Section:  No      Tubal Ligation:  Yes      Hysterectomy:  No      Anticoagulation Therapy:  Yes (Coumadin)      Antibiotic Prophylaxis:  No      Medical History:  Yes: Arthritis, Chemotherapy/Cancer (NON HODGKINS T CELL     LYMPHOMA IN ), Chronic Bronchitis/COPD (USES INHALER), Congestive Heart     Failu, Depression, Anxiety, Diabetes, Seizures, Hemorrhoids/Rectal Prob (GERD,     HIATAL HERNIA), High Blood Pressure, Shortness Of Breath, Miscellaneous     Medical/oth (ELEVATED CHOLESTEROL ); No: Blood Disease, Deafness or Ringing     Ears, Sinus Trouble      Psychiatric History      anxiety depression            PREVENTION      Hx Influenza Vaccination:  Yes      Date Influenza Vaccine Given:  Oct 1, 2020      Influenza Vaccine Declined:  No      2 or More Falls in Past Year?:  Yes      Fall Past Year with Injury?:  Yes      Hx Pneumococcal Vaccination:  Yes      Encouraged to follow-up with:  PCP regarding preventative exams.      Chart initiated by      Joselin Ibarra CMA            ALLERGIES/MEDICATIONS      Allergies:        Coded Allergies:             AZITHROMYCIN (Verified  Allergy, Unknown, 21)           CLARITHROMYCIN (Verified  Allergy, Unknown, 21)           FEXOFENADINE (Verified  Allergy, Unknown, 21)           IODINATED CONTRAST MEDIA (Verified  Allergy, Unknown, 21)           LORATADINE (Verified  Allergy, Unknown, 21)           MACROLIDE ANTIBIOTICS (Verified  Allergy, Unknown, 21)           PENICILLINS (Verified  Allergy, Unknown, 21)           YELLOW DYE (Verified  Allergy, Unknown, 21)           LORAZEPAM (Verified  Adverse Reaction, Unknown, SHAKES, 21)      Medications    Last Reconciled on 21 12:01 by ESTHER STEELE MD      Metoprolol Succinate (Metoprolol Succinate) 25 Mg Tab.er.24h      12.5 MG PO BID, #30 TAB 0 Refills         Reported          2/5/21       (Voltren Gel)   No Conflict Check      1         Reported         2/5/21       (Visine Tears)   No Conflict Check      2 DROPS EYE EACH QDAY         Reported         2/5/21       Tiotropium Bromide (Spiriva Respimat 2.5 mcg/Puff) 4 Gm Mist.inhal      2 PUFFS INH RTQDAY, #1 MDI 0 Refills         Reported         2/5/21       Carbidopa/Levodopa 25/100 (Sinemet 25/100) 1 Each Tablet      1 TAB PO TID, TAB         Reported         2/5/21       Magnesium Oxide (Magnesium Oxide*) 400 Mg Tablet      400 MG PO BID, #60 TAB 0 Refills         Reported         2/5/21       Levocetirizine Dihydrochloride (Xyzal) 5 Mg Tablet      5 MG PO HS, TAB         Reported         2/5/21       levETIRAcetam (Keppra) 250 Mg Tablet      500 MG PO BID, TAB         Reported         2/5/21       Insulin Lispro (HumaLOG VIAL) 100 Units/Ml Vial      3 UNITS SUBQ AC PRN for QDAY, #1 VIAL 0 Refills         Reported         2/5/21       Docusate Sodium (Colace) 100 Mg Capsule      100 MG PO BID PRN for CONSTIPATION, #60 CAP 0 Refills         Reported         2/5/21       Acetaminophen (Tylenol) 325 Mg Tablet      650 MG PO Q4H PRN for PAIN OR FEVER, #100 TAB 0 Refills         Reported         2/5/21       Vitamin B Complex (B Complex-Vitamin B-12) 1 Each Tablet      1 TAB PO QDAY, #30 TAB         Reported         10/1/20       lamoTRIgine (LaMICtal) 100 Mg Tab      100 MG PO BID, #60 TAB 0 Refills         Reported         10/1/20       Lactulose (Lactulose*) 10 Gm/15 Ml Solution      30 ML PO HS, #900 ML 0 Refills         Reported         10/1/20       Pantoprazole (Protonix) 40 Mg Tablet.dr      40 MG PO BID, #30 TAB 0 Refills         Reported         9/2/20       Losartan Potassium (Losartan*) 25 Mg Tablet      25 MG PO BID, #60 TAB 0 Refills         Prov: Char,Lj         7/28/20       ARIPiprazole (ABILIFY) 5 Mg Tab      5 MG PO QDAY, #30 TAB         Prov: Char,Lj         7/28/20       Insulin Glargine (Lantus VIAL) 100  Units/Ml Vial      24 UNITS SUBQ HS, #1 VIAL 0 Refills         Reported         6/17/20       Calcium Carbonate/Vitamin D3 (Calcium 600-Vit D3 400 Tablet) 1 Each Tablet      1 EACH PO QDAY, #60 TAB 0 Refills         Reported         6/17/20       Folic Acid (Folic Acid) 1 Mg Tablet      1 MG PO QDAY, #30 TAB 0 Refills         Reported         6/17/20       Levocetirizine (Levocetirizine) 5 Mg Tablet      5 MG PO QPM         Reported         5/13/20       Methotrexate Sodium (Methotrexate) 2.5 Mg Tab      15 MG PO QSATURDAY, TAB         Reported         11/18/19       MDI-Albuterol (Ventolin HFA) 18 Gm Hfa.aer.ad      2 PUFFS INH Q4H PRN for SHORTNESS OF BREATH         Reported         12/27/18      Current Medications      Current Medications Reviewed 2/9/21            EXAM      Vital Signs Reviewed.      General:  WDWN, chronically ill appearing, wheelchair bound.        HEENT: PERRL, EOMI.  OP, nares clear, no sinus tenderness.      Chest:  Barrel chested, good aeration, clear to auscultation bilaterally,     tympanic to percussion bilaterally, no work of breathing noted.      CV: RRR, no MGR, pulses 2+, equal.        Abd: Obese, soft, NT, ND, +BS, no HSM.      EXT: No clubbing, no cyanosis, no edema, no joint tenderness.        Neuro:  A  Skin: No rashes or lesions.      Vitals      Vitals:             Height 4 ft 11 in / 149.86 cm           Weight 170 lbs  / 77.269603 kg           BSA 1.72 m2           BMI 34.3 kg/m2           Temperature 96.7 F / 35.94 C - Tympanic           Pulse 84           Respirations 16           Blood Pressure 150/106 Sitting, Left Arm           Pulse Oximetry 94%, room air            REVIEW      Results Reviewed      PCCS Results Reviewed?:  Yes Prev Lab Results, Yes Prev Radiology Results, Yes     Previous Mecial Records      Lab Results      I personally reviewed Marcia Fiore last office visit note and     personally reviewed a CAT scan and PET scan both done in 09/2020.       Radiographic Results               [facility]                PACS RADIOLOGY REPORT            Patient: JULIANE BEDOLLA   Acct: #[id number]   Report: #[id number]            UNIT #: [id number]    DOS: 20 1245      INSURANCE:[employer] [facility] SOLUTION   ORDER #:[id number]      LOCATION:[facility]     : 1955            PROVIDERS      ADMITTING:     ATTENDING: DALTON KENNEY PA-C      FAMILY:  David Donaldson   ORDERING:  DALTON KENNEY PA-C         OTHER:    DICTATING:  Moody Wallace MD            REQ #:[id number]   EXAM:[facility] - LOW DOSE CHEST CT SCREENING      REASON FOR EXAM:        REASON FOR VISIT:  FORMER SMOKER            *******Signed******         PROCEDURE:   CT LOW DOSE CHEST SCREENING             COMPARISON:   [facility], , CHEST AP/PA 1 VIEW, 2020,     22:31.             REASON FOR SCREENING:   Patient is 64 years of age, asymptomatic, and has a     smoking history of more       than 30 pack years.      SMOKING STATUS:   Former smoker.  Years since quittin                SCREENING VISIT:   Year 1.                   TECHNIQUE:   Axial unenhanced LDCT images from the apices through mid-kidney     were obtained.       Evaluation of solid organs and vascular structures is suboptimal due to the lack    of IV contrast.       Imaging was performed on a [employer] 128 CT scanner.             RADIATION:   CT Dose Index Vol (CTDIvol):    3.085  mGy         Dose Length Product (DLP):    120.8  mGy-cm             DIAGNOSTIC QUALITY:   Satisfactory             FINDINGS:         LUNG NODULES:   A few scattered benign calcified granulomas are noted.  There is    a 1 cm right lower       lobe nodule on image 87.      LUNGS:   There is minimal scattered atelectasis.      COPD:   None.      FIBROSIS:   None      LYMPH NODES:   There is a 2.0 x 1.2 cm pretracheal lymph node on image 49.        OTHER  FINDINGS:   None.               RIGHT PLEURAL SPACE:         EFFUSION:   None.      CALCIFICATION:   None.      THICKENING:   None.      PNEUMOTHORAX:   None.             LEFT PLEURAL SPACE:         EFFUSION:   None.      CALCIFICATION:   None.      THICKENING:   None.      PNEUMOTHORAX:   None.             HEART:         SIZE:   Enlarged.      CORONARY CALC:   Present, although there are changes from coronary artery bypass    surgery.      PERICARDIAL EFFUSION:   None.      OTHER FINDINGS:   None.               UPPER ABDOMEN:   None.        THORAX:   None.        BASE OF NECK:   The thyroid gland is enlarged with bilateral thyroid nodules.               LUNG-RADS CLASSIFICATION:   4X (suspicious, over 15% chance of malignancy)             CONCLUSION:         1. 1 cm right lower lobe nodule with enlarged mediastinal lymph node, concerning    for malignancy.        Recommend further evaluation with PET-CT.      2. Enlarged thyroid gland with bilateral thyroid nodules.  Recommend further     evaluation with       dedicated thyroid ultrasound.              ANDRIA KURTZ MD             Electronically Signed and Approved By: ANDRIA KURTZ MD on 2020 at 14:20                                  Until signed, this is an unconfirmed preliminary report that may contain      errors and is subject to change.                                              RODD1:      D:20 1420               [facility]                PACS RADIOLOGY REPORT            Patient: JULIANE BEDOLLA   Acct: #[id number]   Report: #[id number]            UNIT #: [id number]    DOS: 20       INSURANCE:[employer] SOLUTION   ORDER #:[id number]      LOCATION:PET     : 1955            PROVIDERS      ADMITTING:     ATTENDING: PAULO TOLLIVER      FAMILY:  NONE,MD   ORDERING:  PAULO TOLLIVER         OTHER:    DICTATING:  Ayden Meyer MD            REQ #:[id number]    EXAM:ISKBSMIDTH - SKULL BASE-MIDTHIGH INIT fdg      REASON FOR EXAM:  R91.1      REASON FOR VISIT:  R91.1            *******Signed******         PROCEDURE:   PET CT SKULL BASE TO MID THIGH INITIAL             COMPARISON:   [facility], CT, CT LOW DOSE CHEST SCREENING,    9/22/2020, 13:12.        INDICATIONS:   SPN             TECHNIQUE:   After obtaining the patient's consent, F-18 FDG was administered     intravenously.  A PET       scan with concurrent CT imaging was performed from the skull to the thigh with     multiplanar imaging.             RADIONUCLIDE:     10.24 MCI   F18 FDG- I.V.      LABS:                          Blood Glucose 133 mg/dl      UPTAKE TIME:        56 mins      BACKGROUND UPTAKE:  Mediastinal background 2.5 SUV.  Hepatic background 3.1 SUV.             FINDINGS:         Physiologic uptake is seen in the head and neck.  No abnormal uptake is seen in     thyroid goiter.             1 cm pleural-based nodule in the medial right lower lobe is not hypermetabolic.     Mild ill-defined       uptake in the right mid hilar region is not associated with a distinct mass.      SUV max 2.8.  2 cm       pretracheal lymph node is not hypermetabolic.             No abnormal uptake is seen in the abdomen and pelvis.  No abnormal skeletal     uptake is evident.             CONCLUSION:          PET-CT demonstrating no abnormal uptake in 1 cm pleural-based nodule in medial     right lower lobe       and in a 2 cm pretracheal lymph node.             Follow-up CT scan of the chest is recommended in 6 months.              LIBERTY MCCLELLAND MD             Electronically Signed and Approved By: LIBERTY MCCLELLAND MD on 9/30/2020 at 13:40                                  Until signed, this is an unconfirmed preliminary report that may contain      errors and is subject to change.                                              COUST:      D:09/30/20 1340            Assessment      Solitary lung nodule -  R91.1            Notes      New Diagnostics      * Chest W/O Cont CT, Month         Dx: Solitary lung nodule - R91.1      IMPRESSION:      1. 1 cm right lower lobe pleural based nodule, PET negative.      2. PET negative right paratracheal lymphadenopathy.      3.  Known history of non-Hodgkin's lymphoma.      4. COPD, well-controlled without exacerbation, on triple inhaler therapy doing     well.      5. COVID19 doing well.      6. Obstructive sleep apnea, well-controlled with CPAP.      7. Chronic hypoxemic respiratory failure on 2 liters per minute of oxygen.      8.  History of intracranial hemorrhage.      9. Tobacco abuse with cigarettes in remission, quit smoking a number of years     ago.               PLAN:      1.  Continue triple inhaler therapy with Symbicort and Spiriva.      2.  Repeat six month noncontrast chest CT in one month to follow up lung nodule     and adenopathy.      3. Continue CPAP nightly and with naps on current settings.      4. Continue 2 liters of oxygen with activity to keep SPO2 greater than 90%.      5. Up-to-date with flu, Prevnar and Pneumovax.      6. Follow up with me in six months.            Electronically signed by ESTHER STEELE  02/15/2021 05:23       Disclaimer: Converted document may not contain table formatting or lab diagrams. Please see [employer] for the authenticated document.

## 2021-06-14 NOTE — PROGRESS NOTES
"Chief Complaint  Pain of the Right Shoulder    Subjective          Elysia White presents to [facility] for   History of Present Illness    Patient presents for follow-up evaluation of right proximal humerus fracture.  Patient presents by herself, she is living at a care facility.  Patient original injury was 12/24/2020.  Patient brought disc showing x-ray of her right humerus.  Patient states she still has some pain she states she has improved with range of motion, she has continued therapy at her care facility.  Patient states she still has an achy pain in the shoulder, worse with movement.  Allergies   Allergen Reactions   • Azithromycin Rash   • Clarithromycin Rash   • Contrast Dye Rash   • Loratadine Rash   • Macrolides And Ketolides Rash   • Penicillins Rash        Social History     Socioeconomic History   • Marital status:      Spouse name: Not on file   • Number of children: Not on file   • Years of education: Not on file   • Highest education level: Not on file   Tobacco Use   • Smoking status: Former Smoker     Packs/day: 0.50     Years: 30.00     Pack years: 15.00   • Smokeless tobacco: Never Used   Vaping Use   • Vaping Use: Never used   Substance and Sexual Activity   • Alcohol use: Never     Comment: NEVER. DOES NOT DRINK    • Drug use: Never        REVIEW OF SYSTEMS    Constitutional: Denies fevers, chills, weight loss  Cardiovascular: Denies chest pain, shortness of breath  Skin: Denies rashes, acute skin changes  Neurologic: Denies headache, loss of consciousness  MSK: Right shoulder pain      Objective   Vital Signs:   Pulse 67   Ht 152.4 cm (60\")   Wt 83.9 kg (185 lb)   SpO2 96% Comment: 2 lpm nasal cannula  BMI 36.13 kg/m²     Body mass index is 36.13 kg/m².    Physical Exam    Right shoulder: Mild tenderness palpation of the lateral shoulder, active forward elevation 110, passive forward elevation 140, active abduction 100 with mild pain, external " rotation with abduction 50, internal rotation with abduction 35, mild pain with range of motion.  Neurovascularly intact, elbow wrist hand range of motion intact    Large Joint Arthrocentesis: R subacromial bursa  Date/Time: 6/14/2021 3:58 PM  Consent given by: patient  Site marked: site marked  Timeout: Immediately prior to procedure a time out was called to verify the correct patient, procedure, equipment, support staff and site/side marked as required   Supporting Documentation  Indications: pain   Procedure Details  Location: shoulder - R subacromial bursa  Preparation: Patient was prepped and draped in the usual sterile fashion  Needle gauge: 21 G.  Medications administered: 80 mg methylPREDNISolone acetate 80 MG/ML; 9 mL lidocaine 1 %  Patient tolerance: patient tolerated the procedure well with no immediate complications          Imaging Results (Most Recent)     None         Patient brought imaging disc that was taken at her care facility, x-ray shows good healing of proximal humerus fracture, no increased displacement or angulation.  Result Review :   The following data was reviewed by: JANEE Ramirez on 06/14/2021:  Data reviewed: Radiologic studies Patient brought disc from her care facility, this was reviewed with her by me.             Assessment and Plan    Diagnoses and all orders for this visit:    1. Other closed nondisplaced fracture of proximal end of right humerus with routine healing, subsequent encounter (Primary)    Other orders  -     Large Joint Arthrocentesis: R subacromial bursa      Reviewed x-rays with the patient, advised her to continue working on strength and range of motion, continue therapy,.  We discussed she may find relief with a right shoulder steroid injection which she elected to have, patient tolerated the injection well.  Follow-up in 6 to 8 weeks for reevaluation.      Follow Up   No follow-ups on file.  Patient was given instructions and counseling regarding  her condition or for health maintenance advice. Please see specific information pulled into the AVS if appropriate.

## 2021-06-17 PROBLEM — E11.9 DIABETES (HCC): Status: ACTIVE | Noted: 2021-01-01

## 2021-06-17 PROBLEM — M19.90 ARTHRITIS: Status: ACTIVE | Noted: 2021-01-01

## 2021-06-17 PROBLEM — I50.9 CONGESTIVE HEART FAILURE (HCC): Status: ACTIVE | Noted: 2021-01-01

## 2021-06-17 PROBLEM — I63.9 STROKE: Status: ACTIVE | Noted: 2021-01-01

## 2021-06-17 PROBLEM — H91.90 HEARING LOSS: Status: ACTIVE | Noted: 2021-01-01

## 2021-06-17 PROBLEM — I21.9 HEART ATTACK (HCC): Status: ACTIVE | Noted: 2021-01-01

## 2021-06-17 PROBLEM — M19.90 IDIOPATHIC OSTEOARTHRITIS: Status: ACTIVE | Noted: 2018-10-22

## 2021-06-17 PROBLEM — J44.9 CHRONIC OBSTRUCTIVE PULMONARY DISEASE (HCC): Status: ACTIVE | Noted: 2021-01-01

## 2021-06-17 PROBLEM — C80.1 CANCER (HCC): Status: ACTIVE | Noted: 2021-01-01

## 2021-06-17 PROBLEM — N32.9 BLADDER DISORDER: Status: ACTIVE | Noted: 2021-01-01

## 2021-06-17 PROBLEM — R56.9 SEIZURE (HCC): Status: ACTIVE | Noted: 2021-01-01

## 2021-06-17 PROBLEM — M25.549 ARTHRALGIA OF HAND: Status: ACTIVE | Noted: 2018-10-22

## 2021-06-17 PROBLEM — E78.5 HYPERLIPEMIA: Status: ACTIVE | Noted: 2021-01-01

## 2021-06-17 PROBLEM — G20 PARKINSON'S DISEASE (HCC): Status: ACTIVE | Noted: 2021-01-01

## 2021-06-17 PROBLEM — K21.9 ESOPHAGEAL REFLUX: Status: ACTIVE | Noted: 2021-01-01

## 2021-06-17 PROBLEM — D64.9 ANEMIA: Status: ACTIVE | Noted: 2021-01-01

## 2021-06-17 PROBLEM — N28.9 KIDNEY DISEASE: Status: ACTIVE | Noted: 2021-01-01

## 2021-06-17 PROBLEM — G47.33 OBSTRUCTIVE SLEEP APNEA: Status: ACTIVE | Noted: 2021-01-01

## 2021-06-17 NOTE — TELEPHONE ENCOUNTER
Called to reschedule her appointment for today, 6/17/21 @ 1:15 and her phone went to voicemail and her mailbox was full.

## 2021-07-26 PROBLEM — S42.201D CLOSED FRACTURE OF PROXIMAL END OF RIGHT HUMERUS WITH ROUTINE HEALING: Status: ACTIVE | Noted: 2021-01-01

## 2021-09-03 PROBLEM — R53.1 WEAKNESS: Status: ACTIVE | Noted: 2021-01-01

## 2021-09-03 PROBLEM — E86.0 DEHYDRATION: Status: ACTIVE | Noted: 2021-01-01

## 2021-09-03 PROBLEM — J44.1 COPD WITH ACUTE EXACERBATION: Status: ACTIVE | Noted: 2021-01-01

## 2021-09-03 PROBLEM — E11.65 POORLY CONTROLLED DIABETES MELLITUS: Chronic | Status: ACTIVE | Noted: 2021-01-01

## 2021-09-03 PROBLEM — R09.02 HYPOXIA: Chronic | Status: ACTIVE | Noted: 2021-01-01

## 2021-09-03 NOTE — ED PROVIDER NOTES
Time: 11:10 PM EDT  Arrived by: Ambulance  Chief Complaint: Generalized weakness  History provided by: Patient  History is limited by: N/A     History of Present Illness:    Elysia White is a 65 y.o. female who presents to the emergency department today with complaints of weakness. The patient reports that for the past couple of days, she has been having generalized weakness along with light-headedness and upper abdominal pain. Additionally, the patient vomited two days ago but notes that her emesis was not dark or bloody. She also notes mild shortness of breath, but adds that this is a chronic symptom related to her COPD. She does wear oxygen at home. The patient also states that she has increased urinary frequency which is chronic and baseline.    The patient presently denies any dysuria, hematochezia, melena, cough, chest pain, fever, chills, or body aches. She has an extensive medical history including anemia, arthritis, congestive heart failure, COPD, diabetes mellitus, hyperlipidemia, kidney disease, myocardial infarction, CVA, and Parkinson's Disease. She has been vaccinated against COVID-19. The patient is a former smoker but denies drug or alcohol use. There are no other acute complaints at this time.      History provided by:  Patient   used: No    Weakness - Generalized  Severity:  Moderate  Onset quality:  Gradual  Duration:  2 days  Timing:  Constant  Progression:  Worsening  Chronicity:  New  Context: not alcohol use    Context comment:  Patient has had worsening weakness, light-headedness, and abdominal pain for the past couple days.  Relieved by:  None tried  Worsened by:  Nothing  Ineffective treatments:  None tried  Associated symptoms: abdominal pain (upper), frequency (chronic and unchanged), shortness of breath (chronic and unchanged) and vomiting (two days ago)    Associated symptoms: no chest pain, no cough, no diarrhea, no dysuria, no fever and no myalgias (no body  aches)    Risk factors: congestive heart failure, diabetes and neurologic disease (Parkinson's)        Similar Symptoms Previously: No.  Recently seen: Patient was seen by Dr. Barnett, orthopedics       Patient Care Team  Primary Care Provider: David Donaldson MD    Past Medical History:     Allergies   Allergen Reactions   • Azithromycin Rash   • Clarithromycin Rash   • Contrast Dye Rash   • Loratadine Rash   • Macrolides And Ketolides Rash   • Penicillins Rash     Past Medical History:   Diagnosis Date   • Anemia    • Arthralgia of right hand 10/22/2018   • Arthritis    • Bladder disorder    • Blood disease    • Chest pain    • Chronic obstructive pulmonary disease (CMS/HCC)    • Congestive heart failure (CHF) (CMS/HCC)    • COPD (chronic obstructive pulmonary disease) (CMS/HCC)    • Diabetes (CMS/HCC)    • Hearing loss    • Heart attack (CMS/HCC)    • Hyperlipemia    • Kidney disease    • Limb swelling    • Non Hodgkin's lymphoma (CMS/HCC)         • Obstructive sleep apnea    • Osteoarthritis of carpometacarpal (CMC) joint of right thumb 10/22/2018   • Otitis media    • Parkinson's disease (CMS/HCC)    • Rectal bleeding    • Reflux esophagitis    • Right carpal tunnel syndrome 10/22/2018   • Right wrist pain 10/22/2018   • Seasonal allergies    • Seizure (CMS/HCC)    • Shortness of breath    • Stroke (cerebrum) (CMS/HCC)    • Thyroid disorder      Past Surgical History:   Procedure Laterality Date   • CARDIAC SURGERY     • OTHER SURGICAL HISTORY      HEART VALVES     Family History   Problem Relation Age of Onset   • Diabetes Mother    • Arthritis Mother    • Cancer Father    • Cancer Sister    • Diabetes Sister    • Arthritis Sister    • Diabetes Brother    • Arthritis Brother    • Cancer Daughter    • Arthritis Daughter        Home Medications:  Prior to Admission medications    Medication Sig Start Date End Date Taking? Authorizing Provider   Acetaminophen 325 MG capsule acetaminophen 325 mg oral capsule  take 1 capsule by oral route daily as needed   Active    Berta Peres MD   albuterol sulfate HFA (Ventolin HFA) 108 (90 Base) MCG/ACT inhaler Ventolin HFA 90 mcg/actuation inhalation HFA aerosol inhaler inhale 1 puff (90 mcg) by inhalation route every 6 hours as needed   Active    Berta Peres MD   ARIPiprazole (ABILIFY) 5 MG tablet  4/27/21   Berta Peres MD   aspirin (aspirin) 81 MG EC tablet Aspir-81 81 mg oral tablet,delayed release (DR/EC) take 1 tablet (81 mg) by oral route once daily   Active    Berta Peres MD   budesonide-formoterol (SYMBICORT) 160-4.5 MCG/ACT inhaler  6/1/21   Berta Peres MD   Calcium Carbonate 1500 (600 Ca) MG tablet Calcium 600 600 mg calcium (1,500 mg) oral tablet take 1 tablet by oral route daily   Active    Berta Peres MD   carbamide peroxide (Debrox) 6.5 % otic solution 10 drops.    Berta Peres MD   carbidopa-levodopa-entacapone (STALEVO) -200 MG per tablet carbidopa-levodopa-entacapone -200 mg oral tablet take 1 tablet by oral route 3 times per day   Suspended    Berta Peres MD   Diclofenac Sodium (VOLTAREN) 1 % gel gel  5/23/21   Berta Peres MD   escitalopram (LEXAPRO) 20 MG tablet  6/8/21   Berta Peres MD   Eucrisa 2 % ointment  4/2/21   Berta Peres MD   folic acid (FOLVITE) 1 MG tablet folic acid 1 mg oral tablet take 1 tablet (1 mg) by oral route once daily   Active    Berta Peres MD   HYDROcodone-acetaminophen (NORCO) 5-325 MG per tablet  4/15/21   Berta Peres MD   insulin lispro (humaLOG) 100 UNIT/ML injection  5/26/21   Berta Peres MD   insulin NPH-insulin regular (NovoLIN 70/30) (70-30) 100 UNIT/ML injection Novolin 70/30 U-100 Insulin 100 unit/mL (70-30) subcutaneous suspension inject by subcutaneous route as per insulin protocol   Active    Berta Peres MD   lactulose (CEPHULAC) 10 g packet lactulose 10 gram oral  packet take 1 packet (10 gram) dissolved in 4 ounces of water by oral route once daily   Active    Berta Peres MD   lamoTRIgine (LaMICtal) 100 MG tablet  5/8/21   Berta Peres MD   Lantus 100 UNIT/ML injection  5/18/21   Berta Peres MD   levETIRAcetam (KEPPRA) 500 MG tablet  5/21/21   Berta Peres MD   levocetirizine (XYZAL) 5 MG tablet  6/13/21   Berta Peres MD   losartan (COZAAR) 25 MG tablet  4/18/21   Berta Peres MD   methotrexate 2.5 MG tablet  6/5/21   Berta Peres MD   metoprolol tartrate (LOPRESSOR) 25 MG tablet metoprolol tartrate 25 mg oral tablet take 1 tablet (25 mg) by oral route once daily   Active    Berta Peres MD   neomycin-bacitracin-polymyxin-hydrocortisone (CORTISPORIN) 1 % ophthalmic ointment neomycin-bacitracin-poly-HC 3.5-400-10,000 mg-unit/g-1% ophthalmic (eye) ointment apply a small amount in the conjunctival sac in the left eye by ophthalmic route every 4 hours   Active    Berta Peres MD   nystatin 258021 UNIT/GM powder  5/25/21   Berta Peres MD   pantoprazole (Protonix) 40 MG EC tablet Protonix 40 mg oral tablet,delayed release (DR/EC) take 1 tablet (40 mg) by oral route once daily   Active    Breta Peres MD   Spiriva Respimat 2.5 MCG/ACT aerosol solution inhaler  6/10/21   Berta Peres MD   warfarin (COUMADIN) 1 MG tablet  6/7/21   Berta Peres MD   warfarin (COUMADIN) 5 MG tablet  4/19/21   Berta Peres MD        Social History:   Social History     Tobacco Use   • Smoking status: Former Smoker     Packs/day: 0.50     Years: 30.00     Pack years: 15.00   • Smokeless tobacco: Never Used   Vaping Use   • Vaping Use: Never used   Substance Use Topics   • Alcohol use: Never     Comment: NEVER. DOES NOT DRINK    • Drug use: Never         Review of Systems:  Review of Systems   Constitutional: Negative for chills and fever.   HENT: Negative for nosebleeds.   "  Eyes: Negative for redness.   Respiratory: Positive for shortness of breath (chronic and unchanged). Negative for cough.    Cardiovascular: Negative for chest pain.   Gastrointestinal: Positive for abdominal pain (upper) and vomiting (two days ago). Negative for blood in stool and diarrhea.        No hemoptysis.   Genitourinary: Positive for frequency (chronic and unchanged). Negative for dysuria.   Musculoskeletal: Negative for back pain, myalgias (no body aches) and neck pain.   Skin: Negative for rash.   Neurological: Positive for weakness (generalized) and light-headedness.        Physical Exam:  /76 (BP Location: Left arm, Patient Position: Lying)   Pulse 68   Temp 98 °F (36.7 °C) (Oral)   Resp 18   Ht 152.4 cm (60\")   Wt 84.1 kg (185 lb 6.5 oz)   SpO2 100%   BMI 36.21 kg/m²     Physical Exam  Vitals and nursing note reviewed.   Constitutional:       General: She is not in acute distress.     Appearance: She is obese. She is not ill-appearing or toxic-appearing.   HENT:      Head: Normocephalic and atraumatic.      Nose: Nose normal.      Mouth/Throat:      Mouth: Mucous membranes are moist.   Eyes:      General: No scleral icterus.  Cardiovascular:      Rate and Rhythm: Normal rate and regular rhythm.      Pulses: Normal pulses.      Heart sounds: No murmur heard.   Gallop present.       Comments: Good distal pulses.  Pulmonary:      Effort: Pulmonary effort is normal. No accessory muscle usage, respiratory distress or retractions.      Breath sounds: Examination of the right-upper field reveals decreased breath sounds and wheezing. Examination of the left-upper field reveals decreased breath sounds and wheezing. Examination of the right-middle field reveals decreased breath sounds and wheezing. Examination of the left-middle field reveals decreased breath sounds and wheezing. Examination of the right-lower field reveals decreased breath sounds and wheezing. Examination of the left-lower field " reveals decreased breath sounds and wheezing. Decreased breath sounds (bilateral) and wheezing present. No rhonchi or rales.   Chest:      Comments: Scar on the anterior chest wall from valve replacement.  Abdominal:      Palpations: Abdomen is soft.      Tenderness: There is abdominal tenderness (slight upper abdominal tenderness). There is no guarding or rebound.      Comments: No rigidity.   Musculoskeletal:         General: No tenderness. Normal range of motion.      Cervical back: Normal range of motion and neck supple. No tenderness.      Right lower leg: Edema present.      Left lower leg: Edema present.      Comments: Trace edema in the legs. Slight venous stasis dermatitis on the left lower extremity.   Skin:     General: Skin is warm and dry.      Capillary Refill: Capillary refill takes 2 to 3 seconds.   Neurological:      General: No focal deficit present.      Mental Status: She is easily aroused. She is lethargic.      Cranial Nerves: No cranial nerve deficit or dysarthria.      Sensory: Sensation is intact. No sensory deficit.      Motor: Weakness present.   Psychiatric:         Mood and Affect: Mood is depressed.         Behavior: Behavior normal.         Cognition and Memory: Cognition is impaired.                Medications in the Emergency Department:  Medications   magnesium sulfate in D5W 1g/100mL (PREMIX) (0 g Intravenous Stopped 9/3/21 0416)   ipratropium-albuterol (DUO-NEB) nebulizer solution 3 mL (3 mL Nebulization Given 9/3/21 0400)   methylPREDNISolone sodium succinate (SOLU-Medrol) injection 125 mg (125 mg Intravenous Given 9/3/21 0546)        Labs  Lab Results (last 24 hours)     ** No results found for the last 24 hours. **           Imaging:  No Radiology Exams Resulted Within Past 24 Hours    Procedures:  Procedures    Progress  ED Course as of Sep 07 1721   Fri Sep 03, 2021   0011 EKG:    Rhythm: Sinus tachycardia  Rate: 100  Left bundle branch block present  Intervals: Normal MO and  QT interval  T-wave: Nonspecific ST depression in V5, V6, I, II, T wave inversion in I, aVL consistent with a left bundle branch block  ST Segment: Nonspecific ST segments in aVR, V1, V2 and V3, no obvious pathological ST elevation    EKG Comparison: EKG is unchanged from July 24, 2020    Interpreted by me      [SD]      ED Course User Index  [SD] Guilherme Herron DO                            Medical Decision Making:  [facility]             Final diagnoses:   Weakness   COPD exacerbation (CMS/[facility])   Acute on chronic respiratory failure with hypoxia (CMS/[facility])   Dehydration   Renal insufficiency   Hypomagnesemia   Anemia, unspecified type        Disposition:  ED Disposition     ED Disposition Condition Comment    Decision to Admit  Level of Care: Telemetry [5]   Diagnosis: Weakness [410215]   Admitting Physician: RITO FORMAN [[id number]]   Attending Physician: RITO FORMAN [[id number]]   Isolate for COVID?: Yes [1]   Certification: I Certify That Inpatient Hospital Services Are Medically Necessary For Greater Than 2 Midnights            This medical record created using voice recognition software and may contain unintended errors.    Documentation assistance provided by Radha George acting as scribe for Guilherme Herron DO. Information recorded by the scribe was done at my direction and has been verified and validated by me.          Radha George  09/02/21 [id number]       Guilherme Herron DO  09/07/21 [id number]

## 2021-09-03 NOTE — PLAN OF CARE
Goal Outcome Evaluation:  Plan of Care Reviewed With: patient, daughter        Progress: no change

## 2021-09-03 NOTE — H&P
[facility]   HISTORY AND PHYSICAL    Patient Name: Elysia White  : 1955  MRN: [id number]  Primary Care Physician:  Angelika Nathan  Date of admission: 2021    Subjective   Subjective     Chief Complaint:   Weakness    HPI:    Elysia White is a 65 y.o. female brought into ER last night for weakness.  Patient's daughter was called in to get history she reports she was feeling weak and difficulty walking and standing. Somewhat confused, her daughter was concerned that her CO2 was high and she acts like this when it happens as well last possible UTI.  Brought into ER where she had work-up in the ER physician called me for admission for COPD exacerbation and possible Covid.  Patient's chest x-ray was clear.  Patient denies any chest pain or shortness of breath now.  She has received steroids and neb treatments.    Review of Systems:  Fatigue and weakness.  No fever chills.  Some shortness of breath which has resolved.  No nausea vomiting diarrhea.  Confusion and weakness.  Denies burning with urination or abdominal discomfort    Personal History     Past Medical History:   Diagnosis Date   • Anemia    • Arthralgia of right hand 10/22/2018   • Arthritis    • Bladder disorder    • Blood disease    • Chest pain    • Chronic obstructive pulmonary disease (CMS/HCC)    • Congestive heart failure (CHF) (CMS/HCC)    • COPD (chronic obstructive pulmonary disease) (CMS/HCC)    • Diabetes (CMS/HCC)    • Hearing loss    • Heart attack (CMS/HCC)    • Hyperlipemia    • Kidney disease    • Limb swelling    • Non Hodgkin's lymphoma (CMS/HCC)         • Obstructive sleep apnea    • Osteoarthritis of carpometacarpal (CMC) joint of right thumb 10/22/2018   • Otitis media    • Parkinson's disease (CMS/HCC)    • Rectal bleeding    • Reflux esophagitis    • Right carpal tunnel syndrome 10/22/2018   • Right wrist pain 10/22/2018   • Seasonal allergies    • Seizure (CMS/HCC)    • Shortness of breath    • Stroke (cerebrum)  (CMS/[facility])    • Thyroid disorder        Past Surgical History:   Procedure Laterality Date   • CARDIAC SURGERY     • OTHER SURGICAL HISTORY      HEART VALVES       Family History: family history includes Arthritis in her brother, daughter, mother, and sister; Cancer in her daughter, father, and sister; Diabetes in her brother, mother, and sister. Otherwise pertinent FHx was reviewed and not pertinent to current issue.    Social History:  reports that she has quit smoking. She has a 15.00 pack-year smoking history. She has never used smokeless tobacco. She reports that she does not drink alcohol and does not use drugs.    Home Medications:  Diclofenac Sodium, carbidopa-levodopa, docusate sodium, escitalopram, ferrous sulfate, lamoTRIgine, levETIRAcetam, levocetirizine, losartan, magnesium oxide, methotrexate, metoprolol tartrate, nystatin, pantoprazole, simvastatin, tiotropium bromide monohydrate, and warfarin      Allergies:  Allergies   Allergen Reactions   • Azithromycin Rash   • Clarithromycin Rash   • Contrast Dye Rash   • Loratadine Rash   • Macrolides And Ketolides Rash   • Penicillins Rash       Objective   Objective     Vitals:   Temp:  [98.2 °F (36.8 °C)-99.6 °F (37.6 °C)] 98.2 °F (36.8 °C)  Heart Rate:  [] 86  Resp:  [19-26] 20  BP: (125-172)/() 141/70  Flow (L/min):  [2] 2  Physical Exam.  Elderly female looks older than her stated age, not in acute distress.  HEENT is unremarkable.  Neck supple.  Heart regular.  Lungs diminished breath sounds but clear bilaterally no wheezing.  Abdomen soft nontender.  Extremities no edema.  Neurologically awake alert and oriented          I have personally reviewed the results from the time of this admission to 9/3/2021 19:15 EDT and agree with these findings:  [x]  Laboratory  [x]  Microbiology  [x]  Radiology  []  EKG/Telemetry   []  Cardiology/Vascular   []  Pathology  []  Old records  []  Other:    CBC:    WBC   Date Value Ref Range Status   09/02/2021  12.19 (H) 3.40 - 10.80 10*3/mm3 Final     RBC   Date Value Ref Range Status   09/02/2021 2.92 (L) 3.77 - 5.28 10*6/mm3 Final     Hemoglobin   Date Value Ref Range Status   09/02/2021 9.3 (L) 12.0 - 15.9 g/dL Final     Hematocrit   Date Value Ref Range Status   09/02/2021 27.5 (L) 34.0 - 46.6 % Final     MCV   Date Value Ref Range Status   09/02/2021 94.2 79.0 - 97.0 fL Final     MCH   Date Value Ref Range Status   09/02/2021 31.8 26.6 - 33.0 pg Final     MCHC   Date Value Ref Range Status   09/02/2021 33.8 31.5 - 35.7 g/dL Final     RDW   Date Value Ref Range Status   09/02/2021 15.9 (H) 12.3 - 15.4 % Final     RDW-SD   Date Value Ref Range Status   09/02/2021 51.8 37.0 - 54.0 fl Final     MPV   Date Value Ref Range Status   09/02/2021 9.2 6.0 - 12.0 fL Final     Platelets   Date Value Ref Range Status   09/02/2021 185 140 - 450 10*3/mm3 Final     Neutrophil %   Date Value Ref Range Status   09/02/2021 88.4 (H) 42.7 - 76.0 % Final     Lymphocyte %   Date Value Ref Range Status   09/02/2021 5.3 (L) 19.6 - 45.3 % Final     Monocyte %   Date Value Ref Range Status   09/02/2021 4.9 (L) 5.0 - 12.0 % Final     Eosinophil %   Date Value Ref Range Status   09/02/2021 0.2 (L) 0.3 - 6.2 % Final     Basophil %   Date Value Ref Range Status   09/02/2021 0.3 0.0 - 1.5 % Final     Immature Grans %   Date Value Ref Range Status   09/02/2021 0.9 (H) 0.0 - 0.5 % Final     Neutrophils, Absolute   Date Value Ref Range Status   09/02/2021 10.76 (H) 1.70 - 7.00 10*3/mm3 Final     Lymphocytes, Absolute   Date Value Ref Range Status   09/02/2021 0.65 (L) 0.70 - 3.10 10*3/mm3 Final     Monocytes, Absolute   Date Value Ref Range Status   09/02/2021 0.60 0.10 - 0.90 10*3/mm3 Final     Eosinophils, Absolute   Date Value Ref Range Status   09/02/2021 0.03 0.00 - 0.40 10*3/mm3 Final     Basophils, Absolute   Date Value Ref Range Status   09/02/2021 0.04 0.00 - 0.20 10*3/mm3 Final     Immature Grans, Absolute   Date Value Ref Range Status    09/02/2021 0.11 (H) 0.00 - 0.05 10*3/mm3 Final     nRBC   Date Value Ref Range Status   09/02/2021 0.2 0.0 - 0.2 /100 WBC Final        BMP:    Lab Results   Component Value Date    GLUCOSE 268 (H) 09/02/2021    BUN 25 (H) 09/02/2021    CREATININE 1.21 (H) 09/02/2021    EGFRIFNONA 44 (L) 09/02/2021    EGFRIFAFRI 81 08/08/2020    BCR 20.7 09/02/2021    K 4.4 09/02/2021    CO2 24.5 09/02/2021    CALCIUM 8.7 09/02/2021    ALBUMIN 3.10 (L) 09/02/2021    LABIL2 0.8 (L) 07/26/2020    AST 22 09/02/2021    ALT 8 09/02/2021        No radiology results for the last day           Assessment/Plan   Assessment / Plan       Current Diagnosis:  Active Hospital Problems    Diagnosis    • Weakness    • Hypoxia    • Poorly controlled diabetes mellitus (CMS/HCC)    • Dehydration    • COPD with acute exacerbation (CMS/HCC)      Plan:     Patient presented with weakness and mild hypoxia.  Appears to be stable now.  Flu test and Covid negative.  Patient started on steroids.  She is oxygen dependent at home oxygen sats looks good.  Will decrease steroids.  Continue O2 supplementation.  Gentle hydration.  Recheck labs in a.m..  Monitor sugars with steroids, use sliding scale.  Start essential home meds  Discussed with patient's daughter in detail.  Discharge home tomorrow if remains stable      DVT prophylaxis:  Medical and mechanical DVT prophylaxis orders are present.    GI Prophylaxis:    Pepcid    CODE STATUS:    Code Status: CPR  Medical Interventions (Level of Support Prior to Arrest): Full    Admission Status:  I believe this patient meets inpatient status.             I have dictated this note utilizing Dragon Dictation.             Please note that portions of this note were completed with a voice recognition program.             Part of this note may be an electronic transcription/translation of spoken language to printed text         using the Dragon Dictation System.       Electronically signed by Aj Green MD, 09/03/21,  7:06 PM EDT.    Total time spent with in evaluation and management:

## 2021-09-04 NOTE — PLAN OF CARE
Goal Outcome Evaluation: C/O intermittent memory lapse otherwise no co this shift.   Plan of Care Reviewed With: patient, daughter        Progress: improving

## 2021-09-04 NOTE — PAYOR COMM NOTE
"Elysia Bedolla (65 y.o. Female)     Date of Birth Social Security Number Address Home Phone MRN    1955  [address] [place] [id number] [phone] [id number]    Sabianist Marital Status          Unknown        Admission Date Admission Type Admitting Provider Attending Provider Department, Room/Bed    9/2/21 Emergency Aj Green MD Ahmed, Ayaz, MD [facility] UNIT 2, 261/1    Discharge Date Discharge Disposition Discharge Destination                       Attending Provider: Aj Green MD    Allergies: Azithromycin, Clarithromycin, Contrast Dye, Loratadine, Macrolides And Ketolides, Penicillins    Isolation: None   Infection: None   Code Status: CPR    Ht: 152.4 cm (60\")   Wt: 84.1 kg (185 lb 6.5 oz)    Admission Cmt: None   Principal Problem: None                Active Insurance as of 9/2/2021     Primary Coverage     Payor Plan Insurance Group Employer/Plan Group    SIGNATURE MEDICARE ADVANTAGE SIGNATURE ADVANTAGE [id number]     Payor Plan Address Payor Plan Phone Number Payor Plan Fax Number Effective Dates    [address] [address] [phone]  6/1/2021 - None Entered    [facility] [id number]       Subscriber Name Subscriber Birth Date Member ID       ELYSIA BEDOLLA 1955 [id number]           Secondary Coverage     Payor Plan Insurance Group Employer/Plan Group    [place]      Payor Plan Address Payor Plan Phone Number Payor Plan Fax Number Effective Dates    [address] [phone]  10/2/2020 - None Entered    [facility] [address]       Subscriber Name Subscriber Birth Date Member ID       ELYSIA BEDOLLA 1955 [id number]                 Emergency Contacts      (Rel.) Home Phone Work Phone Mobile Phone    TALHA SAMANIEGO (Daughter) -- -- [phone]            Emergency Department Notes    No notes of this type exist for this encounter.       Aj Green MD   Physician   Medicine   H&P       Signed   Date of Service: "  21   Creation Time:  21            Signed        Expand AllCollapse All      Show:Clear all  [x]Manual[x]Template[]Copied    Added by:  [x]Aj Green MD    []Lindsey for details   [facility]   HISTORY AND PHYSICAL     Patient Name: Elysia White  : 1955  MRN: [id number]  Primary Care Physician:  Angelika Nathan  Date of admission: 2021        Subjective []Expand by Default     Subjective      Chief Complaint:   Weakness     HPI:     Elysia White is a 65 y.o. female brought into ER last night for weakness.  Patient's daughter was called in to get history she reports she was feeling weak and difficulty walking and standing. Somewhat confused, her daughter was concerned that her CO2 was high and she acts like this when it happens as well last possible UTI.  Brought into ER where she had work-up in the ER physician called me for admission for COPD exacerbation and possible Covid.  Patient's chest x-ray was clear.  Patient denies any chest pain or shortness of breath now.  She has received steroids and neb treatments.     Review of Systems:  Fatigue and weakness.  No fever chills.  Some shortness of breath which has resolved.  No nausea vomiting diarrhea.  Confusion and weakness.  Denies burning with urination or abdominal discomfort     Personal History      Medical History        Past Medical History:   Diagnosis Date   • Anemia     • Arthralgia of right hand 10/22/2018   • Arthritis     • Bladder disorder     • Blood disease     • Chest pain     • Chronic obstructive pulmonary disease (CMS/HCC)     • Congestive heart failure (CHF) (CMS/HCC)     • COPD (chronic obstructive pulmonary disease) (CMS/HCC)     • Diabetes (CMS/HCC)     • Hearing loss     • Heart attack (CMS/HCC)     • Hyperlipemia     • Kidney disease     • Limb swelling     • Non Hodgkin's lymphoma (CMS/HCC)           • Obstructive sleep apnea     • Osteoarthritis of carpometacarpal (CMC) joint of right thumb  10/22/2018   • Otitis media     • Parkinson's disease (CMS/HCC)     • Rectal bleeding     • Reflux esophagitis     • Right carpal tunnel syndrome 10/22/2018   • Right wrist pain 10/22/2018   • Seasonal allergies     • Seizure (CMS/HCC)     • Shortness of breath     • Stroke (cerebrum) (CMS/HCC)     • Thyroid disorder              Surgical History         Past Surgical History:   Procedure Laterality Date   • CARDIAC SURGERY       • OTHER SURGICAL HISTORY         HEART VALVES            Family History: family history includes Arthritis in her brother, daughter, mother, and sister; Cancer in her daughter, father, and sister; Diabetes in her brother, mother, and sister. Otherwise pertinent FHx was reviewed and not pertinent to current issue.     Social History:  reports that she has quit smoking. She has a 15.00 pack-year smoking history. She has never used smokeless tobacco. She reports that she does not drink alcohol and does not use drugs.     Home Medications:  Diclofenac Sodium, carbidopa-levodopa, docusate sodium, escitalopram, ferrous sulfate, lamoTRIgine, levETIRAcetam, levocetirizine, losartan, magnesium oxide, methotrexate, metoprolol tartrate, nystatin, pantoprazole, simvastatin, tiotropium bromide monohydrate, and warfarin        Allergies:       Allergies   Allergen Reactions   • Azithromycin Rash   • Clarithromycin Rash   • Contrast Dye Rash   • Loratadine Rash   • Macrolides And Ketolides Rash   • Penicillins Rash               Objective      Objective      Vitals:   Temp:  [98.2 °F (36.8 °C)-99.6 °F (37.6 °C)] 98.2 °F (36.8 °C)  Heart Rate:  [] 86  Resp:  [19-26] 20  BP: (125-172)/() 141/70  Flow (L/min):  [2] 2  Physical Exam.  Elderly female looks older than her stated age, not in acute distress.  HEENT is unremarkable.  Neck supple.  Heart regular.  Lungs diminished breath sounds but clear bilaterally no wheezing.  Abdomen soft nontender.  Extremities no edema.  Neurologically awake alert  and oriented              I have personally reviewed the results from the time of this admission to 9/3/2021 19:15 EDT and agree with these findings:  [x]?  Laboratory  [x]?  Microbiology  [x]?  Radiology  []?  EKG/Telemetry   []?  Cardiology/Vascular   []?  Pathology  []?  Old records  []?  Other:     CBC:           WBC   Date Value Ref Range Status   09/02/2021 12.19 (H) 3.40 - 10.80 10*3/mm3 Final      RBC   Date Value Ref Range Status   09/02/2021 2.92 (L) 3.77 - 5.28 10*6/mm3 Final            Hemoglobin   Date Value Ref Range Status   09/02/2021 9.3 (L) 12.0 - 15.9 g/dL Final            Hematocrit   Date Value Ref Range Status   09/02/2021 27.5 (L) 34.0 - 46.6 % Final            MCV   Date Value Ref Range Status   09/02/2021 94.2 79.0 - 97.0 fL Final            MCH   Date Value Ref Range Status   09/02/2021 31.8 26.6 - 33.0 pg Final            MCHC   Date Value Ref Range Status   09/02/2021 33.8 31.5 - 35.7 g/dL Final            RDW   Date Value Ref Range Status   09/02/2021 15.9 (H) 12.3 - 15.4 % Final            RDW-SD   Date Value Ref Range Status   09/02/2021 51.8 37.0 - 54.0 fl Final            MPV   Date Value Ref Range Status   09/02/2021 9.2 6.0 - 12.0 fL Final            Platelets   Date Value Ref Range Status   09/02/2021 185 140 - 450 10*3/mm3 Final            Neutrophil %   Date Value Ref Range Status   09/02/2021 88.4 (H) 42.7 - 76.0 % Final            Lymphocyte %   Date Value Ref Range Status   09/02/2021 5.3 (L) 19.6 - 45.3 % Final            Monocyte %   Date Value Ref Range Status   09/02/2021 4.9 (L) 5.0 - 12.0 % Final      Eosinophil %   Date Value Ref Range Status   09/02/2021 0.2 (L) 0.3 - 6.2 % Final            Basophil %   Date Value Ref Range Status   09/02/2021 0.3 0.0 - 1.5 % Final            Immature Grans %   Date Value Ref Range Status   09/02/2021 0.9 (H) 0.0 - 0.5 % Final            Neutrophils, Absolute   Date Value Ref Range Status   09/02/2021 10.76 (H) 1.70 - 7.00 10*3/mm3  Final            Lymphocytes, Absolute   Date Value Ref Range Status   09/02/2021 0.65 (L) 0.70 - 3.10 10*3/mm3 Final            Monocytes, Absolute   Date Value Ref Range Status   09/02/2021 0.60 0.10 - 0.90 10*3/mm3 Final            Eosinophils, Absolute   Date Value Ref Range Status   09/02/2021 0.03 0.00 - 0.40 10*3/mm3 Final            Basophils, Absolute   Date Value Ref Range Status   09/02/2021 0.04 0.00 - 0.20 10*3/mm3 Final            Immature Grans, Absolute   Date Value Ref Range Status   09/02/2021 0.11 (H) 0.00 - 0.05 10*3/mm3 Final            nRBC   Date Value Ref Range Status   09/02/2021 0.2 0.0 - 0.2 /100 WBC Final         BMP:           Lab Results   Component Value Date     GLUCOSE 268 (H) 09/02/2021     BUN 25 (H) 09/02/2021     CREATININE 1.21 (H) 09/02/2021     EGFRIFNONA 44 (L) 09/02/2021     EGFRIFAFRI 81 08/08/2020     BCR 20.7 09/02/2021     K 4.4 09/02/2021     CO2 24.5 09/02/2021     CALCIUM 8.7 09/02/2021     ALBUMIN 3.10 (L) 09/02/2021     LABIL2 0.8 (L) 07/26/2020     AST 22 09/02/2021     ALT 8 09/02/2021         No radiology results for the last day                     Assessment/Plan      Assessment / Plan         Current Diagnosis:       Active Hospital Problems     Diagnosis     • Weakness     • Hypoxia     • Poorly controlled diabetes mellitus (CMS/HCC)     • Dehydration     • COPD with acute exacerbation (CMS/HCC)        Plan:      Patient presented with weakness and mild hypoxia.  Appears to be stable now.  Flu test and Covid negative.  Patient started on steroids.  She is oxygen dependent at home oxygen sats looks good.  Will decrease steroids.  Continue O2 supplementation.  Gentle hydration.  Recheck labs in a.m..  Monitor sugars with steroids, use sliding scale.  Start essential home meds  Discussed with patient's daughter in detail.  Discharge home tomorrow if remains stable        DVT prophylaxis:  Medical and mechanical DVT prophylaxis orders are present.     GI  Prophylaxis:    Pepcid     CODE STATUS:    Code Status: CPR  Medical Interventions (Level of Support Prior to Arrest): Full     Admission Status:  I believe this patient meets inpatient status.              I have dictated this note utilizing Dragon Dictation.             Please note that portions of this note were completed with a voice recognition program.             Part of this note may be an electronic transcription/translation of spoken language to printed text         using the Dragon Dictation System.         Electronically signed by Aj Green MD, 09/03/21, 7:06 PM EDT.     Total time spent with in evaluation and management:                          Routing History                      CONTACT   MED RUBIO UTILIZATION REVIEW    [facility]  [address] [address]  TAX ID [id number]  NPI  [id number]       [phone]   [phone]    ++++no other clinical available at this time++++++++

## 2021-09-04 NOTE — PROGRESS NOTES
[facility]   Progress Note    Patient Name: Elysia Whtie  : 1955  MRN: [id number]  Primary Care Physician: David Donaldson MD  Date of admission: 2021    Subjective   Subjective     Chief Complaint:  Follow-up on weakness  History of Present Illness   Patient is awake and alert  No complaints  Wants to go home        Review of Systems   Constitutional: Negative for activity change.   Respiratory: Positive for cough. Negative for chest tightness.        Objective   Objective     Vitals:  Temp:  [97.3 °F (36.3 °C)-98.2 °F (36.8 °C)] 97.3 °F (36.3 °C)  Heart Rate:  [] 70  Resp:  [20-22] 20  BP: ()/() 152/55  Flow (L/min):  [2] 2    Physical Exam  Cardiovascular:      Rate and Rhythm: Normal rate.   Pulmonary:      Effort: Pulmonary effort is normal.   Neurological:      General: No focal deficit present.      Mental Status: She is alert and oriented to person, place, and time.         Result Review    Result Review:  I have personally reviewed the results from the time of this admission to 21 8:56 AM EDT and agree with these findings:  [x]  Laboratory  []  Microbiology  []  Radiology  []  EKG/Telemetry   []  Cardiology/Vascular   []  Pathology  []  Old records  []  Other:    Assessment/Plan   Assessment / Plan   Exacerbation of COPD  Elevated INR    Active Hospital Problems:  Active Hospital Problems    Diagnosis    • Weakness    • Hypoxia    • Poorly controlled diabetes mellitus (CMS/HCC)    • Dehydration    • COPD with acute exacerbation (CMS/HCC)        Plan:  Hold Coumadin  Increase activity  Possible discharge to home in the morning                  Electronically signed by Lj Pardo MD, 21, 8:56 AM EDT.

## 2021-09-05 NOTE — DISCHARGE SUMMARY
[facility]         DISCHARGE SUMMARY    Patient Name: Elysia White  : 1955  MRN: [id number]    Date of Admission: 2021  Date of Discharge: 2021 \  primary Care Physician: Angelika Nathan PA-C    Consults     Date and Time Order Name Status Description    9/3/2021  3:34 AM General MD Inpatient Consult Completed           Presenting Problem:   Dehydration [E86.0]  Hypomagnesemia [E83.42]  Weakness [R53.1]  Renal insufficiency [N28.9]  COPD exacerbation (CMS/HCC) [J44.1]  Acute on chronic respiratory failure with hypoxia (CMS/HCC) [J96.21]  Anemia, unspecified type [D64.9]    Active and Resolved Hospital Problems:  Active Hospital Problems    Diagnosis POA   • Weakness [R53.1] Yes   • Hypoxia [R09.02] Yes   • Poorly controlled diabetes mellitus (CMS/HCC) [E11.65] Yes   • Dehydration [E86.0] Yes   • COPD with acute exacerbation (CMS/HCC) [J44.1] Yes      Resolved Hospital Problems   No resolved problems to display.         Hospital Course     Hospital Course:  Elysia White is a 65 y.o. female   The patient was brought to the emergency department by her daughter with complaints of weakness and confusion  She was admitted to the hospital with a diagnosis of exacerbation of COPD  She was started on IV antibiotics and steroids  She did not have hypercapnia  INR was elevated therefore Coumadin was withheld  The patient was clinically improved and stable and was discharged home on 2021          Day of Discharge     Vital Signs:  Temp:  [97.3 °F (36.3 °C)-98.2 °F (36.8 °C)] 97.5 °F (36.4 °C)  Heart Rate:  [74-88] 78  Resp:  [18-20] 18  BP: (125-180)/(48-76) 173/74  Flow (L/min):  [2] 2  Physical Exam:      Pertinent  and/or Most Recent Results     LAB RESULTS:  Lab Results   Component Value Date    WBC 12.52 (H) 2021    HGB 8.5 (L) 2021    HCT 26.9 (L) 2021    MCV 96.8 2021     (L) 2021     Lab Results   Component Value Date    GLUCOSE 266 (H)  09/04/2021    BUN 31 (H) 09/04/2021    CREATININE 1.10 (H) 09/04/2021    EGFRIFNONA 50 (L) 09/04/2021    EGFRIFAFRI 81 08/08/2020    BCR 28.2 (H) 09/04/2021    K 4.9 09/04/2021    CO2 23.1 09/04/2021    CALCIUM 9.0 09/04/2021    ALBUMIN 2.70 (L) 09/04/2021    LABIL2 0.8 (L) 07/26/2020    AST 19 09/04/2021    ALT 6 09/04/2021     Brief Urine Lab Results  (Last result in the past 365 days)      Color   Clarity   Blood   Leuk Est   Nitrite   Protein   CREAT   Urine HCG        09/03/21 0132 Yellow Clear Moderate (2+) Negative Negative >=300 mg/dL (3+)             Microbiology Results (last 10 days)     Procedure Component Value - Date/Time    COVID-19, [facility] IN-HOUSE, NP SWAB IN TRANSPORT MEDIA 8-10 HR TAT - Swab, Nasopharynx [[id number]]  (Normal) Collected: 09/03/21 0132    Lab Status: Final result Specimen: Swab from Nasopharynx Updated: 09/03/21 1132     COVID19 Not Detected    Narrative:      Testing performed by Real Time RT-PCR  This test has not been approved by the USDA but is authorized under the Emergency Use Act (EUA)    Fact sheet for providers: https://www.fda.gov/media/973140/download    Fact sheet for patients: https://www.fda.gov/media/995528/download      Testing performed by Real Time RT-PCR  This test has not been approved by the USDA but is authorized under the Emergency Use Act (EUA)    Fact sheet for providers: https://www.fda.gov/media/226015/download    Fact sheet for patients: https://www.fda.gov/media/383723/download        Influenza Antigen, Rapid - Swab, Nasopharynx [[id number]]  (Normal) Collected: 09/03/21 0132    Lab Status: Final result Specimen: Swab from Nasopharynx Updated: 09/03/21 0211     Influenza A Ag, EIA Negative     Influenza B Ag, EIA Negative                           Labs Pending at Discharge:        Discharge Details        Discharge Medications      New Medications      Instructions Start Date   predniSONE 20 MG tablet  Commonly known as: DELTASONE   20 mg, Oral,  Daily         Changes to Medications      Instructions Start Date   warfarin 6 MG tablet  Commonly known as: COUMADIN  What changed: Another medication with the same name was removed. Continue taking this medication, and follow the directions you see here.   6 mg, Oral, Take As Directed         Continue These Medications      Instructions Start Date   carbidopa-levodopa  MG per tablet  Commonly known as: SINEMET   1 tablet, Oral, 3 Times Daily      Diclofenac Sodium 1 % gel gel  Commonly known as: VOLTAREN   4 g, Topical, 4 Times Daily PRN      docusate sodium 100 MG capsule  Commonly known as: COLACE   100 mg, Oral, 2 Times Daily      escitalopram 10 MG tablet  Commonly known as: LEXAPRO   5-10 mg, Oral, Daily      ferrous sulfate 325 (65 FE) MG tablet   325 mg, Oral, Daily With Breakfast      lamoTRIgine 100 MG tablet  Commonly known as: LaMICtal   100 mg, Oral, 2 Times Daily      levETIRAcetam 500 MG tablet  Commonly known as: KEPPRA   500 mg, Oral, 2 Times Daily      levocetirizine 5 MG tablet  Commonly known as: XYZAL   5 mg, Oral, Daily      losartan 25 MG tablet  Commonly known as: COZAAR   25 mg, Oral, Daily      magnesium oxide 400 (241.3 Mg) MG tablet tablet  Commonly known as: MAGOX   400 mg, Oral, Daily      methotrexate 2.5 MG tablet   15 mg, Oral, Weekly, Pt takes on Saturday      metoprolol tartrate 25 MG tablet  Commonly known as: LOPRESSOR   25 mg, Oral, Daily      nystatin 243423 UNIT/GM powder  Generic drug: nystatin   1 application, Topical, 2 Times Daily      nystatin 539301 UNIT/GM cream  Commonly known as: MYCOSTATIN   1 application, Topical, 2 Times Daily PRN      Protonix 20 MG EC tablet  Generic drug: pantoprazole   20 mg, Oral, Daily      simvastatin 20 MG tablet  Commonly known as: ZOCOR   20 mg, Oral, Daily      Spiriva Respimat 2.5 MCG/ACT aerosol solution inhaler  Generic drug: tiotropium bromide monohydrate   2 puffs, Inhalation, Daily - RT             Allergies   Allergen  Reactions   • Azithromycin Rash   • Clarithromycin Rash   • Contrast Dye Rash   • Loratadine Rash   • Macrolides And Ketolides Rash   • Penicillins Rash         Discharge Disposition:  [place] [facility]    Diet:  Hospital:  Diet Order   Procedures   • Diet Regular; Cardiac, Consistent Carbohydrate         Discharge Activity:         Future Appointments   Date Time Provider Department Center   2/9/2022  9:45 AM Inez Lyons APRN [facility]           Time spent on Discharge including face to face service: 20 minutes    Electronically signed by Lj Pardo MD, 09/05/21, 10:12 AM EDT.

## 2021-09-06 NOTE — OUTREACH NOTE
Prep Survey      Responses   [facility] facility patient discharged from?  [facility]   Is LACE score < 7 ?  No   Emergency Room discharge w/ pulse ox?  No   Eligibility  Readm Mgmt   Discharge diagnosis  weakness and mild hypoxia.   Does the patient have one of the following disease processes/diagnoses(primary or secondary)?  Other   Does the patient have Home health ordered?  No   Is there a DME ordered?  No   Prep survey completed?  Yes          Charo Redd RN

## 2021-09-08 NOTE — OUTREACH NOTE
Medical Week 1 Survey      Responses   [facility] patient discharged from?  [facility]   Does the patient have one of the following disease processes/diagnoses(primary or secondary)?  Other   Week 1 attempt successful?  Yes   Call start time  1320   Call end time  1332   Discharge diagnosis  weakness and mild hypoxia.   Meds reviewed with patient/caregiver?  Yes   Is the patient having any side effects they believe may be caused by any medication additions or changes?  No   Does the patient have all medications ordered at discharge?  Yes   Is the patient taking all medications as directed (includes completed medication regime)?  No   What is preventing the patient from taking all medications as directed?  Other [Daughter reports that Pt already has Coumadin at home and is taking as ordered . She reports due to switching insurance she was unable to get prednisone. Daughter did not ask what the price of med would be without insurance in effect. She will call . ]   Nursing Interventions  Nurse provided patient education, Advised patient to call provider   Medication comments  Instructed Daughter to call Pharm to inquire how much the steroid would cost. She V/U. She also will report Pt not taking med to [facility] tomorrow at the appt.    Comments regarding appointments  Daughter reports Pt has FU 9/9/21 [facility]    Does the patient have a primary care provider?   Yes   Comments regarding PCP  Daughter will call PCP office for follow up   Has the patient kept scheduled appointments due by today?  N/A   What is the Home health agency?   [facility]    Home health comments  Coming tomorrow.    Psychosocial issues?  No   Did the patient receive a copy of their discharge instructions?  Yes   Nursing interventions  Reviewed instructions with patient   What is the patient's perception of their health status since discharge?  New symptoms unrelated to diagnosis [Daughter reports Pt now has a rash on her foot and part of her leg.  "She will have HHN look at it and will report it when she goes to her appt tomorrow. States breathing is much better and  her sats are \"good\". Pt is wearing O2. ]   Is the patient/caregiver able to teach back signs and symptoms related to disease process for when to call PCP?  Yes   Is the patient/caregiver able to teach back signs and symptoms related to disease process for when to call 911?  Yes   Is the patient/caregiver able to teach back the hierarchy of who to call/visit for symptoms/problems? PCP, Specialist, Home health nurse, Urgent Care, ED, 911  Yes   Week 1 call completed?  Yes   Wrap up additional comments  Daughter reports Pt is doing much better and has HHN coming. Pt did not  prednisone as daughter states insurance was not active at this time, however is getting it resolved. Pt has an appt with [facility] tomorrow and will inform. Pt also has a rash on foot and leg that she will discuss at appt.           Fabiola Pratt RN  "

## 2021-09-10 NOTE — ED PROVIDER NOTES
Time: 10:20 PM EDT  Arrived by: private car  Chief Complaint: LEG SWELLING and PAIN    History of Present Illness:  Patient is a 65 y.o. year old female that presents to the emergency department with LEG SWELLING and PAIN. This started about a week ago and is still present and constant. It has gradually worsened. Symptoms are moderate. Nothing improves or exacerbates symptoms.     No fever or chills. She denies injury to the leg. Pt does reports CP.     Pt is on Coumadin.     History provided by:  Patient      Similar Symptoms Previously: no  Recently seen: Pt was recently hospitalized.       Patient Care Team  Primary Care Provider: David Donaldson MD/Char     Past Medical History:     Allergies   Allergen Reactions   • Azithromycin Rash   • Clarithromycin Rash   • Contrast Dye Rash   • Loratadine Rash   • Macrolides And Ketolides Rash   • Penicillins Rash     Past Medical History:   Diagnosis Date   • Anemia    • Arthralgia of right hand 10/22/2018   • Arthritis    • Bladder disorder    • Blood disease    • Chest pain    • Chronic obstructive pulmonary disease (CMS/HCC)    • Congestive heart failure (CHF) (CMS/HCC)    • COPD (chronic obstructive pulmonary disease) (CMS/HCC)    • Diabetes (CMS/HCC)    • Hearing loss    • Heart attack (CMS/HCC)    • Hyperlipemia    • Kidney disease    • Limb swelling    • Non Hodgkin's lymphoma (CMS/HCC)         • Obstructive sleep apnea    • Osteoarthritis of carpometacarpal (CMC) joint of right thumb 10/22/2018   • Otitis media    • Parkinson's disease (CMS/HCC)    • Rectal bleeding    • Reflux esophagitis    • Right carpal tunnel syndrome 10/22/2018   • Right wrist pain 10/22/2018   • Seasonal allergies    • Seizure (CMS/HCC)    • Shortness of breath    • Stroke (cerebrum) (CMS/HCC)    • Thyroid disorder      Past Surgical History:   Procedure Laterality Date   • CARDIAC SURGERY     • OTHER SURGICAL HISTORY      HEART VALVES     Family History   Problem Relation Age of  Onset   • Diabetes Mother    • Arthritis Mother    • Cancer Father    • Cancer Sister    • Diabetes Sister    • Arthritis Sister    • Diabetes Brother    • Arthritis Brother    • Cancer Daughter    • Arthritis Daughter        Home Medications:  Prior to Admission medications    Medication Sig Start Date End Date Taking? Authorizing Provider   carbidopa-levodopa (SINEMET)  MG per tablet Take 1 tablet by mouth 3 (Three) Times a Day. 7/17/21   Berta Peres MD   Diclofenac Sodium (VOLTAREN) 1 % gel gel Apply 4 g topically to the appropriate area as directed 4 (Four) Times a Day As Needed.    Berta Peres MD   docusate sodium (COLACE) 100 MG capsule Take 100 mg by mouth 2 (Two) Times a Day.    Berta Peres MD   escitalopram (LEXAPRO) 10 MG tablet Take 5-10 mg by mouth Daily. 6/8/21   Berta Peres MD   ferrous sulfate 325 (65 FE) MG tablet Take 325 mg by mouth Daily With Breakfast.    Berta Peres MD   lamoTRIgine (LaMICtal) 100 MG tablet Take 100 mg by mouth 2 (Two) Times a Day. 5/8/21   Berta Peres MD   levETIRAcetam (KEPPRA) 500 MG tablet Take 500 mg by mouth 2 (Two) Times a Day. 5/21/21   Berta Peres MD   levocetirizine (XYZAL) 5 MG tablet Take 5 mg by mouth Daily. 6/13/21   Berta Peres MD   losartan (COZAAR) 25 MG tablet Take 25 mg by mouth Daily. 4/18/21   Berta Peres MD   magnesium oxide (MAGOX) 400 (241.3 Mg) MG tablet tablet Take 400 mg by mouth Daily.    Berta Peres MD   methotrexate 2.5 MG tablet Take 15 mg by mouth 1 (One) Time Per Week. Pt takes on Saturday 6/5/21   Berta Peres MD   metoprolol tartrate (LOPRESSOR) 25 MG tablet Take 25 mg by mouth Daily.    Berta Peres MD   nystatin (MYCOSTATIN) 978056 UNIT/GM cream Apply 1 application topically to the appropriate area as directed 2 (Two) Times a Day As Needed for Skin Irritation. 7/17/21   Berta Peres MD   nystatin 485863 UNIT/GM  "powder Apply 1 application topically to the appropriate area as directed 2 (Two) Times a Day. 5/25/21   Berta Peres MD   pantoprazole (Protonix) 20 MG EC tablet Take 20 mg by mouth Daily.    Berta Peres MD   predniSONE (DELTASONE) 20 MG tablet Take 1 tablet by mouth Daily for 5 days. 9/5/21 9/10/21  Lj Pardo MD   simvastatin (ZOCOR) 20 MG tablet Take 20 mg by mouth Daily. 7/14/21   Berta Peres MD   Spiriva Respimat 2.5 MCG/ACT aerosol solution inhaler Inhale 2 puffs Daily. 6/10/21   Berta Peres MD   warfarin (COUMADIN) 6 MG tablet Take 6 mg by mouth Take As Directed. 6/15/21   Berta Peres MD        Social History:   Social History     Tobacco Use   • Smoking status: Former Smoker     Packs/day: 0.50     Years: 30.00     Pack years: 15.00   • Smokeless tobacco: Never Used   Vaping Use   • Vaping Use: Never used   Substance Use Topics   • Alcohol use: Never     Comment: NEVER. DOES NOT DRINK    • Drug use: Never       Record Review:  I have reviewed the patient's records in [employer].     Review of Systems:  Review of Systems   Constitutional: Negative for chills, diaphoresis and fever.   HENT: Negative for ear discharge and nosebleeds.    Eyes: Negative for photophobia.   Respiratory: Negative for shortness of breath.    Cardiovascular: Positive for chest pain and leg swelling.   Gastrointestinal: Negative for diarrhea, nausea and vomiting.   Genitourinary: Negative for dysuria.   Musculoskeletal: Negative for back pain and neck pain.   Skin: Negative for rash.   Neurological: Negative for headaches.   All other systems reviewed and are negative.       Physical Exam:  /52 (BP Location: Right arm, Patient Position: Sitting)   Pulse 90   Temp 100.4 °F (38 °C) (Oral)   Resp 20   Ht 149.9 cm (59\")   Wt 83.2 kg (183 lb 6.8 oz)   SpO2 97%   BMI 37.05 kg/m²     Physical Exam  Vitals and nursing note reviewed.   Constitutional:       General: She is not in " acute distress.     Appearance: Normal appearance.   HENT:      Head: Normocephalic and atraumatic.      Nose: Nose normal.   Eyes:      General: No scleral icterus.  Cardiovascular:      Rate and Rhythm: Normal rate and regular rhythm.      Heart sounds: Normal heart sounds.   Pulmonary:      Effort: Pulmonary effort is normal. No respiratory distress.      Breath sounds: Normal breath sounds.   Abdominal:      Palpations: Abdomen is soft.      Tenderness: There is no abdominal tenderness.   Musculoskeletal:         General: Normal range of motion.      Cervical back: Neck supple.      Right lower leg: No edema.      Left lower leg: No edema.      Comments: Mild erythema over left anterior lower leg that is tender and mildly swollen.    Skin:     General: Skin is warm and dry.   Neurological:      General: No focal deficit present.      Mental Status: She is alert and oriented to person, place, and time.      Sensory: No sensory deficit.      Motor: No weakness.                Medications in the Emergency Department:  Medications   sodium chloride 0.9 % flush 10 mL (has no administration in time range)   cephalexin (KEFLEX) capsule 500 mg (has no administration in time range)        Labs  Lab Results (last 24 hours)     Procedure Component Value Units Date/Time    Protime-INR [[id number]]  (Abnormal) Collected: 09/09/21 2206    Specimen: Blood Updated: 09/09/21 2245     Protime 18.8 Seconds      INR 1.85    Narrative:      Suggested Therapeutic Ranges For Oral Anticoagulant Therapy:  Level of Therapy                      INR Target Range  Standard Dose                            2.0-3.0  High Dose                                2.5-3.5  Patients not receiving anticoagulant  Therapy Normal Range                     0.6-1.2    CBC & Differential [[id number]]  (Abnormal) Collected: 09/09/21 2206    Specimen: Blood Updated: 09/09/21 2217    Narrative:      The following orders were created for panel order CBC &  Differential.  Procedure                               Abnormality         Status                     ---------                               -----------         ------                     CBC Auto Differential[[id number]]        Abnormal            Final result                 Please view results for these tests on the individual orders.    Basic Metabolic Panel [[id number]]  (Abnormal) Collected: 09/09/21 2206    Specimen: Blood Updated: 09/09/21 2250     Glucose 290 mg/dL      BUN 26 mg/dL      Creatinine 1.17 mg/dL      Sodium 137 mmol/L      Potassium 3.8 mmol/L      Chloride 102 mmol/L      CO2 25.5 mmol/L      Calcium 8.6 mg/dL      eGFR Non African Amer 46 mL/min/1.73      BUN/Creatinine Ratio 22.2     Anion Gap 9.5 mmol/L     Narrative:      GFR Normal >60  Chronic Kidney Disease <60  Kidney Failure <15      CBC Auto Differential [[id number]]  (Abnormal) Collected: 09/09/21 2206    Specimen: Blood Updated: 09/09/21 2217     WBC 9.30 10*3/mm3      RBC 3.18 10*6/mm3      Hemoglobin 9.7 g/dL      Hematocrit 29.8 %      MCV 93.7 fL      MCH 30.5 pg      MCHC 32.6 g/dL      RDW 15.1 %      RDW-SD 52.3 fl      MPV 9.9 fL      Platelets 256 10*3/mm3      Neutrophil % 66.4 %      Lymphocyte % 14.6 %      Monocyte % 10.6 %      Eosinophil % 4.0 %      Basophil % 0.3 %      Immature Grans % 4.1 %      Neutrophils, Absolute 6.17 10*3/mm3      Lymphocytes, Absolute 1.36 10*3/mm3      Monocytes, Absolute 0.99 10*3/mm3      Eosinophils, Absolute 0.37 10*3/mm3      Basophils, Absolute 0.03 10*3/mm3      Immature Grans, Absolute 0.38 10*3/mm3      nRBC 0.0 /100 WBC            Imaging:  No Radiology Exams Resulted Within Past 24 Hours    Procedures:  Procedures    Progress                            Medical Decision Making:  MDM     Vascular technician reports vascular study is negative for DVT of left lower extremity.  The patient´s CBC was reviewed and shows no abnormalities of critical concern. Of note, there is no anemia  requiring a blood transfusion and the platelet count is acceptable.  INR subtherapeutic.  Patient will require frequent monitoring of her INR while on antibiotic therapy.  We will continue treatment for cellulitis.  We discussed return precautions including worsening symptoms or any additional concerns.    Final diagnoses:   Cellulitis of left lower extremity        Disposition:  ED Disposition     ED Disposition Condition Comment    Discharge Stable             This medical record created using voice recognition software and may contain unintended errors.    Documentation assistance provided by Thea Bojorquez acting as scribe for Javier Lowe MD. Information recorded by the scribe was done at my direction and has been verified and validated by me.         Thea Bojorquez  09/09/21 [id number]       Javier Lowe MD  09/10/21 0804

## 2021-09-10 NOTE — ED NOTES
Patient recently released from hospital due to short of breath, reports no one took care of her left leg swelling and redness while she was in hospital.     Angelika Mireles, RN  09/09/21 [id number]

## 2021-09-15 NOTE — OUTREACH NOTE
Medical Week 2 Survey      Responses   [facility] patient discharged from?  [place]   Does the patient have one of the following disease processes/diagnoses(primary or secondary)?  Other   Week 2 attempt successful?  No   Unsuccessful attempts  Attempt 1          Macy Grimaldo RN

## 2021-09-22 NOTE — OUTREACH NOTE
Medical Week 2 Survey      Responses   [facility] patient discharged from?  [place]   Does the patient have one of the following disease processes/diagnoses(primary or secondary)?  Other   Week 2 attempt successful?  No   Unsuccessful attempts  Attempt 1   Discharge diagnosis  weakness and mild hypoxia. [ER visit on 9/9/21]          Fabiola Pratt RN

## 2021-12-19 NOTE — ED PROVIDER NOTES
Time: 01:16 EST  Arrived by: Vehicle  Chief Complaint: Fall  History provided by: Patient and family  History is limited by: N/A    History of Present Illness:  Patient is a 66 y.o. year old female that presents to the emergency department with fall with injuries to head and face.  Patient is on Coumadin      History provided by:  Patient and relative  Fall  Mechanism of injury: fall    Injury location:  Face, head/neck and shoulder/arm  Head/neck injury location:  Head, L neck and R neck  Facial injury location:  Upper lip and nose  Shoulder/arm injury location:  L wrist and R wrist  Incident location:  Home  Time since incident:  4 hours  Arrived directly from scene: yes    Fall:     Fall occurred: Patient was in her room and was trying to get something and lost her balance and fell hitting bedside table and then floor and tried to catch self with arms.    Impact surface:  Carpet    Point of impact:  Unable to specify    Entrapped after fall: no    Protective equipment: none    Suspicion of alcohol use: no    Suspicion of drug use: no    Tetanus status:  Up to date  Prior to arrival data:     Bystander interventions:  None    Patient ambulatory at scene: yes      Blood loss:  Minimal    Responsiveness at scene:  Alert    Orientation at scene:  Person, place, time and situation    Loss of consciousness: no      Amnesic to event: yes      Mental status condition since incident:  Stable  Associated symptoms: neck pain    Associated symptoms: no abdominal pain, no back pain, no blindness, no chest pain, no difficulty breathing, no headaches, no hearing loss, no loss of consciousness, no nausea, no seizures and no vomiting    Risk factors: anticoagulation therapy ( Coumadin.  Dosing has been stable and unchanged for a long time)            Similar Symptoms Previously: None  Recently seen: Routine PCP visits      Patient Care Team  Primary Care Provider: Angelika Nathan    Past Medical History:     Allergies   Allergen  Reactions   • Azithromycin Rash   • Clarithromycin Rash   • Contrast Dye Rash   • Loratadine Rash   • Macrolides And Ketolides Rash   • Penicillins Rash     Past Medical History:   Diagnosis Date   • Anemia    • Arthralgia of right hand 10/22/2018   • Arthritis    • Bladder disorder    • Blood disease    • Chest pain    • Chronic obstructive pulmonary disease ([facility])    • Congestive heart failure (CHF) ([facility])    • COPD (chronic obstructive pulmonary disease) ([facility])    • Diabetes ([facility])    • Hearing loss    • Heart attack ([facility])    • Hyperlipemia    • Kidney disease    • Limb swelling    • Non Hodgkin's lymphoma ([facility])         • Obstructive sleep apnea    • Osteoarthritis of carpometacarpal (CMC) joint of right thumb 10/22/2018   • Otitis media    • Parkinson's disease ([facility])    • Rectal bleeding    • Reflux esophagitis    • Right carpal tunnel syndrome 10/22/2018   • Right wrist pain 10/22/2018   • Seasonal allergies    • Seizure ([facility])    • Shortness of breath    • Stroke (cerebrum) ([facility])    • Thyroid disorder      Past Surgical History:   Procedure Laterality Date   • CARDIAC SURGERY     • OTHER SURGICAL HISTORY      HEART VALVES     Family History   Problem Relation Age of Onset   • Diabetes Mother    • Arthritis Mother    • Cancer Father    • Cancer Sister    • Diabetes Sister    • Arthritis Sister    • Diabetes Brother    • Arthritis Brother    • Cancer Daughter    • Arthritis Daughter        Home Medications:  Prior to Admission medications    Medication Sig Start Date End Date Taking? Authorizing Provider   carbidopa-levodopa (SINEMET)  MG per tablet Take 1 tablet by mouth 3 (Three) Times a Day. 7/17/21   ProviderBerta MD   Diclofenac Sodium (VOLTAREN) 1 % gel gel Apply 4 g topically to the appropriate area as directed 4 (Four) Times a Day As Needed.    ProviderBerta MD   dicyclomine (BENTYL) 20 MG tablet Take 1 tablet by mouth Every 6 (Six) Hours. 11/26/21   Rogelio Ayoub, DO    diphenoxylate-atropine (LOMOTIL) 2.5-0.025 MG per tablet Take 1 tablet by mouth 4 (Four) Times a Day As Needed for Diarrhea. 11/26/21   Rogelio Ayoub DO   docusate sodium (COLACE) 100 MG capsule Take 100 mg by mouth 2 (Two) Times a Day.    Berta Peres MD   escitalopram (LEXAPRO) 10 MG tablet Take 5-10 mg by mouth Daily. 6/8/21   Berta Peres MD   ferrous sulfate 325 (65 FE) MG tablet Take 325 mg by mouth Daily With Breakfast.    Berta Peres MD   lamoTRIgine (LaMICtal) 100 MG tablet Take 100 mg by mouth 2 (Two) Times a Day. 5/8/21   Berta Peres MD   levETIRAcetam (KEPPRA) 500 MG tablet Take 500 mg by mouth 2 (Two) Times a Day. 5/21/21   Berta Peres MD   levocetirizine (XYZAL) 5 MG tablet Take 5 mg by mouth Daily. 6/13/21   Berta Peres MD   losartan (COZAAR) 25 MG tablet Take 25 mg by mouth Daily. 4/18/21   Berta Peres MD   magnesium oxide (MAGOX) 400 (241.3 Mg) MG tablet tablet Take 400 mg by mouth Daily.    Berta Peres MD   methotrexate 2.5 MG tablet Take 15 mg by mouth 1 (One) Time Per Week. Pt takes on Saturday 6/5/21   Berta Peres MD   metoprolol tartrate (LOPRESSOR) 25 MG tablet Take 25 mg by mouth Daily.    Berta Peres MD   nystatin (MYCOSTATIN) 401304 UNIT/GM cream Apply 1 application topically to the appropriate area as directed 2 (Two) Times a Day As Needed for Skin Irritation. 7/17/21   Berta Peres MD   nystatin 819555 UNIT/GM powder Apply 1 application topically to the appropriate area as directed 2 (Two) Times a Day. 5/25/21   Berta Peres MD   pantoprazole (Protonix) 20 MG EC tablet Take 20 mg by mouth Daily.    Berta Peres MD   simvastatin (ZOCOR) 20 MG tablet Take 20 mg by mouth Daily. 7/14/21   Berta Peres MD   Spiriva Respimat 2.5 MCG/ACT aerosol solution inhaler Inhale 2 puffs Daily. 6/10/21   Berta Peres MD   warfarin (COUMADIN) 6 MG tablet Take 6 mg  "by mouth Take As Directed. 6/15/21   Provider, MD Berta        Social History:   PT  reports that she has quit smoking. She has a 15.00 pack-year smoking history. She has never used smokeless tobacco. She reports that she does not drink alcohol and does not use drugs.    Record Review:  I have reviewed the patient's records in [employer].     Review of Systems  Review of Systems   HENT: Negative for dental problem, hearing loss and nosebleeds.    Eyes: Negative for blindness and visual disturbance.   Respiratory: Negative for cough and shortness of breath.    Cardiovascular: Negative for chest pain.   Gastrointestinal: Negative for abdominal pain, nausea and vomiting.   Genitourinary: Negative for dysuria and flank pain.   Musculoskeletal: Positive for neck pain. Negative for back pain.   Skin: Positive for wound ( Injuries to back of head).        Bruising to face   Neurological: Negative for dizziness, seizures, loss of consciousness, weakness, light-headedness, numbness and headaches.   Hematological: Negative.    Psychiatric/Behavioral: Negative.         Physical Exam  /84   Pulse 79   Temp 98.6 °F (37 °C)   Resp 20   Ht 152.4 cm (60\")   Wt 81.4 kg (179 lb 7.3 oz)   LMP  (LMP Unknown)   SpO2 95%   Breastfeeding No   BMI 35.05 kg/m²     Physical Exam  Vitals and nursing note reviewed.   Constitutional:       General: She is not in acute distress.     Appearance: Normal appearance. She is not toxic-appearing.   HENT:      Head:      Comments: Tenderness and swelling to posterior vertex down central part of occiput with mild swelling     Right Ear: Tympanic membrane, ear canal and external ear normal.      Left Ear: Tympanic membrane, ear canal and external ear normal.      Nose:      Comments: Mild.  No nosebleed     Mouth/Throat:      Mouth: Mucous membranes are moist.      Comments: Bruising to upper lip and area above upper lip.  No laceration.  No dental fractures or malocclusion  Eyes:      " "General: No scleral icterus.     Extraocular Movements: Extraocular movements intact.      Conjunctiva/sclera: Conjunctivae normal.      Pupils: Pupils are equal, round, and reactive to light.   Cardiovascular:      Rate and Rhythm: Normal rate and regular rhythm.      Pulses: Normal pulses.      Heart sounds: Normal heart sounds.   Pulmonary:      Effort: Pulmonary effort is normal. No respiratory distress.      Breath sounds: Normal breath sounds.   Chest:      Chest wall: No tenderness.   Abdominal:      General: Bowel sounds are normal.      Palpations: Abdomen is soft.      Tenderness: There is no abdominal tenderness. There is no right CVA tenderness or left CVA tenderness.   Musculoskeletal:         General: Normal range of motion.      Cervical back: Normal range of motion and neck supple. Tenderness ( Bilateral soft tissue) present.   Neurological:      Mental Status: She is alert and oriented to person, place, and time.      Cranial Nerves: No cranial nerve deficit.      Sensory: No sensory deficit.   Psychiatric:         Mood and Affect: Mood normal.         Behavior: Behavior normal.         Thought Content: Thought content normal.         Judgment: Judgment normal.                  ED Course  /84   Pulse 79   Temp 98.6 °F (37 °C)   Resp 20   Ht 152.4 cm (60\")   Wt 81.4 kg (179 lb 7.3 oz)   LMP  (LMP Unknown)   SpO2 95%   Breastfeeding No   BMI 35.05 kg/m²   Results for orders placed or performed during the hospital encounter of 12/18/21   Protime-INR    Specimen: Arm, Left; Blood   Result Value Ref Range    Protime 49.4 (H) 9.4 - 12.0 Seconds    INR 5.06 (C) 2.00 - 3.00   CBC Auto Differential    Specimen: Arm, Left; Blood   Result Value Ref Range    WBC 8.93 3.40 - 10.80 10*3/mm3    RBC 3.30 (L) 3.77 - 5.28 10*6/mm3    Hemoglobin 9.8 (L) 12.0 - 15.9 g/dL    Hematocrit 29.7 (L) 34.0 - 46.6 %    MCV 90.0 79.0 - 97.0 fL    MCH 29.7 26.6 - 33.0 pg    MCHC 33.0 31.5 - 35.7 g/dL    RDW 18.2 " (H) 12.3 - 15.4 %    RDW-SD 58.8 (H) 37.0 - 54.0 fl    MPV 9.6 6.0 - 12.0 fL    Platelets 255 140 - 450 10*3/mm3    Neutrophil % 68.0 42.7 - 76.0 %    Lymphocyte % 19.1 (L) 19.6 - 45.3 %    Monocyte % 8.1 5.0 - 12.0 %    Eosinophil % 3.2 0.3 - 6.2 %    Basophil % 0.6 0.0 - 1.5 %    Immature Grans % 1.0 (H) 0.0 - 0.5 %    Neutrophils, Absolute 6.07 1.70 - 7.00 10*3/mm3    Lymphocytes, Absolute 1.71 0.70 - 3.10 10*3/mm3    Monocytes, Absolute 0.72 0.10 - 0.90 10*3/mm3    Eosinophils, Absolute 0.29 0.00 - 0.40 10*3/mm3    Basophils, Absolute 0.05 0.00 - 0.20 10*3/mm3    Immature Grans, Absolute 0.09 (H) 0.00 - 0.05 10*3/mm3    nRBC 0.2 0.0 - 0.2 /100 WBC   Green Top (Gel)   Result Value Ref Range    Extra Tube Hold for add-ons.    Lavender Top   Result Value Ref Range    Extra Tube hold for add-on    Gold Top - SST   Result Value Ref Range    Extra Tube Hold for add-ons.    Light Blue Top   Result Value Ref Range    Extra Tube hold for add-on      Medications   acetaminophen (TYLENOL) tablet 650 mg (650 mg Oral Given 12/18/21 5895)     CT Abdomen Pelvis Without Contrast    Result Date: 11/26/2021  Narrative: PROCEDURE: CT ABDOMEN PELVIS WO CONTRAST  COMPARISON: [facility], PET, SKULL BASE TO MID THIGH INITIAL, 9/30/2020, 12:25.  [facility], CT, PELVIS W/O CONTRAST, 4/07/2021, 14:47.  INDICATIONS: abdominal pain. DIFFUSE ABDOMINAL PAIN AND DIARRHEA X 1 DAY  TECHNIQUE: CT images were created without intravenous contrast.   PROTOCOL:   Standard imaging protocol performed    RADIATION:   DLP: 571.5 mGy*cm   Automated exposure control was utilized to minimize radiation dose.  FINDINGS:  Within the lung bases is a stable 1 cm pleural-based right lower lobe nodule, likely benign.  A couple benign calcified granulomas are noted.  The unenhanced liver, adrenal glands, spleen, and pancreas are unremarkable.  There are bilateral renal cysts, as well as a stable 3.3 cm partially calcified mass along the  inferior right kidney.  The stomach appears normal.  The small bowel appears normal in caliber and configuration.  The colon appears normal.  The appendix appears normal.  There is no ascites or loculated collection.  No abnormally enlarged lymph nodes are identified.  There is some laxity along the anterior abdominal wall superiorly.  The rectum, uterus and adnexa, and urinary bladder are unremarkable.  No aggressive osseous lesions are identified.  CONCLUSION:  1. No acute process identified within abdomen/pelvis. 2. Stable 3.3 cm partially calcified mass along inferior right kidney, nonspecific.  This was not FDG avid on prior PET-CT.     ANDRIA KURTZ MD       Electronically Signed and Approved By: ANDRIA KURTZ MD on 11/26/2021 at 1:50             XR Wrist 3+ View Left    Result Date: 12/18/2021  Narrative: PROCEDURE: XR WRIST 3+ VW LEFT  COMPARISON: [facility], , WRIST >OR= 3V LT, 7/20/2020, 18:58.  INDICATIONS: PATIENT FELL A FEW DAYS AGO AND AGAIN TODAY. BILATERAL WRIST PAIN AND SWELLING.  FINDINGS: 3 views were obtained.  No acute fracture or acute malalignment is identified.  There is generalized osteopenia.  Moderate to severe degenerative changes involve the left wrist, especially radially.  Soft tissue swelling involves the dorsal aspect of the left wrist and the left hand.  No subcutaneous emphysema or retained radiopaque foreign body is identified.  There is probably an incidental benign bone island involving the left 1st distal phalanx, measuring about 4 mm, seen previously.  If symptoms or clinical concerns persist, consider imaging follow-up.  CONCLUSION: No acute fracture or acute malalignment is identified.     JESS VIVAS JR, MD       Electronically Signed and Approved By: JESS VIVAS JR, MD on 12/18/2021 at 22:04             XR Wrist 3+ View Right    Result Date: 12/18/2021  Narrative: PROCEDURE: XR WRIST 3+ VW RIGHT  COMPARISONS: [facility], CR, XR  WRIST 3+ VW LEFT, 12/18/2021, 21:39.   [facility], CR, WRIST 2V AP AND LAT RT, 12/29/2020, 15:07.   [facility], CR, WRIST >OR= 3V LT, 7/20/2020, 18:58.   [facility], CR, WRIST 2V AP AND LAT RT, 10/18/2018, 15:39.  INDICATIONS: THE PATIENT FELL A FEW DAYS AGO &  AGAIN TODAY; BILATERAL WRIST PAIN & SWELLING.  FINDINGS: 3 views were obtained.  No acute fracture or acute malalignment is identified.  There is generalized osteopenia.  Moderate-to-severe degenerative changes are seen, especially involving the radial aspect of the right wrist.  Soft tissue swelling is suggested dorsally and may represent acute contusion.  No subcutaneous emphysema or retained radiopaque foreign body.  If symptoms or clinical concerns persist, consider imaging follow-up.  CONCLUSION: No acute fracture or acute malalignment is identified.      JESS VIVAS JR, MD       Electronically Signed and Approved By: JESS VIVAS JR, MD on 12/18/2021 at 22:02             CT Head Without Contrast    Result Date: 12/19/2021  Narrative: PROCEDURE: CT HEAD WO CONTRAST  COMPARISON: [facility], CT, HEAD W/O CONTRAST, 12/03/2020, 12:40.  INDICATIONS: HEADACHE AFTER FALL TODAY.  PROTOCOL:   Standard imaging protocol performed    RADIATION:   MA and/or KV was adjusted to minimize radiation dose.    TECHNIQUE: After obtaining the patient's consent, 106 CT images were obtained without non-ionic intravenous contrast material.  FINDINGS: Encephalomalacia is seen in the posterior cerebral hemispheres bilaterally with evidence for chronic laminar necrosis.  These findings have evolved since the prior 12/3/2020 CT study.  These findings may be the sequelae of prior infarcts and/or be related to prior posterior reversible encephalopathy syndrome (PRES).  Moderate to severe chronic small vessel ischemia/infarction is suggested.  There is a large posterior acute hemorrhagic scalp contusion with a large subgaleal hemorrhagic  component.  There may be associated laceration.  Subcutaneous hemorrhage is also seen.  This finding is centered in the midline and involves probably the parieto-occipital scalp.  No acute skull fracture is seen.  No acute intracranial hemorrhage.  No midline shift or acute intracranial herniation syndrome.  There is probably benign external auditory canal debris bilaterally.  There is mild chronic leftward nasal septal deviation.  Minimal mucosal thickening may be present in the paranasal sinuses.  No air-fluid interfaces are seen within the imaged paranasal sinuses.  CONCLUSION:  1. No acute intracranial hemorrhage.  2. No acute skull fracture.  3. No definite acute brain abnormality.  4. A large acute hemorrhagic posterior scalp contusion is seen.  5. Encephalomalacia involves the bilateral posterior cerebral hemispheres in the parieto-occipital regions and may represent old infarcts and/or be the sequelae of prior PRES.  6. Please see above comments for further detail.    JESS VIVAS JR, MD       Electronically Signed and Approved By: JESS VIVAS JR, MD on 12/19/2021 at 0:00             CT Cervical Spine Without Contrast    Result Date: 12/19/2021  Narrative: PROCEDURE: CT CERVICAL SPINE WO CONTRAST  COMPARISON: None.  INDICATIONS: NECK PAIN AFTER FALL TODAY.  PROTOCOL:   Standard imaging protocol performed    RADIATION: MA and/or KV were/was adjusted to minimize radiation dose.    TECHNIQUE: After obtaining the patient's consent, multi-planar CT images were created without contrast material.  Study is limited by large body habitus.  EXAM FINDINGS: A routine nonenhanced cervical spine CT was performed. Sagittal and coronal two-dimensional reformations are provided for review. No acute cervical spine fracture or acute malalignment is identified. Small nonspecific bilateral cervical lymph nodes are seen.  There is motion artifact on the study.  There are degenerative changes throughout the imaged spine,  probably greatest at C4-5, with chronic retrolisthesis present, estimated 3 mm or slightly less.  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  There is diffuse heterogeneity of the thyroid gland, which is enlarged.  There may be slight substernal extension of the thyroid gland.  The findings are most suggestive of a multinodular goiter.  The thyroid gland contains bilateral foci of calcification, as well.  There is ectasia of the carotid arterial system with narrowing of the posterior, lateral hypopharyngeal airway (which is probably related to the vascular ectasia and large body habitus).  The patient has undergone median sternotomy.  There is lateral curvature of the cervical spine with the convexity to the right, which may be fixed or positional.  CONCLUSION:    No acute cervical spine fracture is seen.  Motion artifact obscures detail on the study.    JESS VIVAS JR, MD       Electronically Signed and Approved By: JESS VIVAS JR, MD on 12/19/2021 at 0:20             CT Facial Bones Without Contrast    Result Date: 12/19/2021  Narrative: PROCEDURE: CT FACIAL BONES WO CONTRAST  COMPARISON: None.  INDICATIONS: BRUISING TO UPPER LIP AND PAIN AFTER FALL TODAY.  PROTOCOL:   Standard imaging protocol performed    RADIATION: Automated exposure control was utilized to minimize radiation dose.  TECHNIQUE: After obtaining the patient's consent, 314 CT images were created without non-ionic intravenous contrast.   EXAM FINDINGS: A routine nonenhanced maxillofacial CT was performed.  Coronal two-dimensional reformations are provided for review. No acute fracture or acute malalignment is identified.  The patient has undergone bilateral cataract extractions with intra-ocular lens implants.  Otherwise, no acute orbital findings are seen.  Degenerative changes involve the temporomandibular joints, greater on the right.  A few opacified left-sided mastoid air cells are seen and may represent chronic congestive  and/or inflammatory change.  There may be mild chronic congestive and/or inflammatory change of the imaged paranasal sinuses.  No air-fluid interfaces are seen.  There is slight probably chronic leftward nasal septal deviation.  Acute soft tissue contusion involves the upper lip, such as seen on image 13 of series 6 and image 11 of series 8.  No unintended retained foreign body is seen in this region.  Minimal if any subcutaneous emphysema is identified.  CONCLUSION:    No acute fracture is seen.     JESS VIVAS JR, MD       Electronically Signed and Approved By: JESS VIVAS JR, MD on 12/19/2021 at 0:15             Medical Decision Making:                     MDM  Number of Diagnoses or Management Options  Abrasion of scalp, initial encounter  Coagulopathy (HCC)  Contusion of scalp, initial encounter  Sprain of left wrist, initial encounter  Sprain of right wrist, initial encounter  Diagnosis management comments: I have spoken with the patient. I have explained the patient´s condition, diagnoses and treatment plan based on the information available to me at this time. I have answered the patients questions and addressed any concerns. The patient has a good  understanding of the patient´s diagnosis, condition, and treatment plan as can be expected at this point. The vital signs have been stable. The patient´s condition is stable and appropriate for discharge from the emergency department.      The patient will pursue further outpatient evaluation with the primary care physician or other designated or consulting physician as outlined in the discharge instructions. They are agreeable to this plan of care and follow-up instructions have been explained in detail. The patient has received these instructions in written format and have expressed an understanding of the discharge instructions. The patient is aware that any significant change in condition or worsening of symptoms should prompt an immediate return to  this or the closest emergency department or call to 911.         Amount and/or Complexity of Data Reviewed  Clinical lab tests: reviewed and ordered  Tests in the radiology section of CPT®: reviewed and ordered  Tests in the medicine section of CPT®: ordered and reviewed  Obtain history from someone other than the patient: yes (relatives)    Risk of Complications, Morbidity, and/or Mortality  Presenting problems: moderate  Diagnostic procedures: moderate  Management options: low    Patient Progress  Patient progress: stable       Final diagnoses:   Contusion of scalp, initial encounter   Sprain of left wrist, initial encounter   Sprain of right wrist, initial encounter   Coagulopathy (HCC)   Abrasion of scalp, initial encounter        Disposition:  ED Disposition     ED Disposition Condition Comment    Discharge Stable            Charo Woods, APRN  12/19/21 0117

## 2021-12-19 NOTE — ED NOTES
Daughter reports patient fell backwards hitting her head on a glass table, patient is unsure what caused her to fall. Laceration to back of head bleeding controlled, bruising and edema noted to top lip.      Praveena Chong, RN  12/18/21 9100

## 2021-12-19 NOTE — ED NOTES
Laceration to back of head cleansed with soap and normal saline.      Praveena Chong, RN  12/19/21 0052

## 2021-12-19 NOTE — DISCHARGE INSTRUCTIONS
Tylenol for pain    Hold coumadin today and have pcp recheck Monday    Follow up on Monday with pcp    Return for new/worse symptoms

## 2021-12-19 NOTE — ED PROVIDER NOTES
"Patient is 66 y.o. year old female that presents to the ED for evaluation of fall and anticoagulation therapy..     Physical Exam    ED Course:    /84   Pulse 79   Temp 98.6 °F (37 °C)   Resp 20   Ht 152.4 cm (60\")   Wt 81.4 kg (179 lb 7.3 oz)   LMP  (LMP Unknown)   SpO2 95%   Breastfeeding No   BMI 35.05 kg/m²   Results for orders placed or performed during the hospital encounter of 12/18/21   Protime-INR    Specimen: Arm, Left; Blood   Result Value Ref Range    Protime 49.4 (H) 9.4 - 12.0 Seconds    INR 5.06 (C) 2.00 - 3.00   CBC Auto Differential    Specimen: Arm, Left; Blood   Result Value Ref Range    WBC 8.93 3.40 - 10.80 10*3/mm3    RBC 3.30 (L) 3.77 - 5.28 10*6/mm3    Hemoglobin 9.8 (L) 12.0 - 15.9 g/dL    Hematocrit 29.7 (L) 34.0 - 46.6 %    MCV 90.0 79.0 - 97.0 fL    MCH 29.7 26.6 - 33.0 pg    MCHC 33.0 31.5 - 35.7 g/dL    RDW 18.2 (H) 12.3 - 15.4 %    RDW-SD 58.8 (H) 37.0 - 54.0 fl    MPV 9.6 6.0 - 12.0 fL    Platelets 255 140 - 450 10*3/mm3    Neutrophil % 68.0 42.7 - 76.0 %    Lymphocyte % 19.1 (L) 19.6 - 45.3 %    Monocyte % 8.1 5.0 - 12.0 %    Eosinophil % 3.2 0.3 - 6.2 %    Basophil % 0.6 0.0 - 1.5 %    Immature Grans % 1.0 (H) 0.0 - 0.5 %    Neutrophils, Absolute 6.07 1.70 - 7.00 10*3/mm3    Lymphocytes, Absolute 1.71 0.70 - 3.10 10*3/mm3    Monocytes, Absolute 0.72 0.10 - 0.90 10*3/mm3    Eosinophils, Absolute 0.29 0.00 - 0.40 10*3/mm3    Basophils, Absolute 0.05 0.00 - 0.20 10*3/mm3    Immature Grans, Absolute 0.09 (H) 0.00 - 0.05 10*3/mm3    nRBC 0.2 0.0 - 0.2 /100 WBC   Green Top (Gel)   Result Value Ref Range    Extra Tube Hold for add-ons.    Lavender Top   Result Value Ref Range    Extra Tube hold for add-on    Gold Top - SST   Result Value Ref Range    Extra Tube Hold for add-ons.    Light Blue Top   Result Value Ref Range    Extra Tube hold for add-on      Medications   acetaminophen (TYLENOL) tablet 650 mg (650 mg Oral Given 12/18/21 6706)     CT Abdomen Pelvis Without " Contrast    Result Date: 11/26/2021  Narrative: PROCEDURE: CT ABDOMEN PELVIS WO CONTRAST  COMPARISON: [facility], PET, SKULL BASE TO MID THIGH INITIAL, 9/30/2020, 12:25.  [facility], CT, PELVIS W/O CONTRAST, 4/07/2021, 14:47.  INDICATIONS: abdominal pain. DIFFUSE ABDOMINAL PAIN AND DIARRHEA X 1 DAY  TECHNIQUE: CT images were created without intravenous contrast.   PROTOCOL:   Standard imaging protocol performed    RADIATION:   DLP: 571.5 mGy*cm   Automated exposure control was utilized to minimize radiation dose.  FINDINGS:  Within the lung bases is a stable 1 cm pleural-based right lower lobe nodule, likely benign.  A couple benign calcified granulomas are noted.  The unenhanced liver, adrenal glands, spleen, and pancreas are unremarkable.  There are bilateral renal cysts, as well as a stable 3.3 cm partially calcified mass along the inferior right kidney.  The stomach appears normal.  The small bowel appears normal in caliber and configuration.  The colon appears normal.  The appendix appears normal.  There is no ascites or loculated collection.  No abnormally enlarged lymph nodes are identified.  There is some laxity along the anterior abdominal wall superiorly.  The rectum, uterus and adnexa, and urinary bladder are unremarkable.  No aggressive osseous lesions are identified.  CONCLUSION:  1. No acute process identified within abdomen/pelvis. 2. Stable 3.3 cm partially calcified mass along inferior right kidney, nonspecific.  This was not FDG avid on prior PET-CT.     ANDRIA KURTZ MD       Electronically Signed and Approved By: ANDRIA KURTZ MD on 11/26/2021 at 1:50             XR Wrist 3+ View Left    Result Date: 12/18/2021  Narrative: PROCEDURE: XR WRIST 3+ VW LEFT  COMPARISON: [facility], CR, WRIST >OR= 3V LT, 7/20/2020, 18:58.  INDICATIONS: PATIENT FELL A FEW DAYS AGO AND AGAIN TODAY. BILATERAL WRIST PAIN AND SWELLING.  FINDINGS: 3 views were obtained.  No  acute fracture or acute malalignment is identified.  There is generalized osteopenia.  Moderate to severe degenerative changes involve the left wrist, especially radially.  Soft tissue swelling involves the dorsal aspect of the left wrist and the left hand.  No subcutaneous emphysema or retained radiopaque foreign body is identified.  There is probably an incidental benign bone island involving the left 1st distal phalanx, measuring about 4 mm, seen previously.  If symptoms or clinical concerns persist, consider imaging follow-up.  CONCLUSION: No acute fracture or acute malalignment is identified.     JESS VIVAS JR, MD       Electronically Signed and Approved By: JESS VIVAS JR, MD on 12/18/2021 at 22:04             XR Wrist 3+ View Right    Result Date: 12/18/2021  Narrative: PROCEDURE: XR WRIST 3+ VW RIGHT  COMPARISONS: [facility], CR, XR WRIST 3+ VW LEFT, 12/18/2021, 21:39.   [facility], CR, WRIST 2V AP AND LAT RT, 12/29/2020, 15:07.   [facility], CR, WRIST >OR= 3V LT, 7/20/2020, 18:58.   [facility], CR, WRIST 2V AP AND LAT RT, 10/18/2018, 15:39.  INDICATIONS: THE PATIENT FELL A FEW DAYS AGO &  AGAIN TODAY; BILATERAL WRIST PAIN & SWELLING.  FINDINGS: 3 views were obtained.  No acute fracture or acute malalignment is identified.  There is generalized osteopenia.  Moderate-to-severe degenerative changes are seen, especially involving the radial aspect of the right wrist.  Soft tissue swelling is suggested dorsally and may represent acute contusion.  No subcutaneous emphysema or retained radiopaque foreign body.  If symptoms or clinical concerns persist, consider imaging follow-up.  CONCLUSION: No acute fracture or acute malalignment is identified.      JESS VIVAS JR, MD       Electronically Signed and Approved By: JESS VIVAS JR, MD on 12/18/2021 at 22:02             CT Head Without Contrast    Result Date: 12/19/2021  Narrative: PROCEDURE: CT HEAD WO  CONTRAST  COMPARISON: [facility], CT, HEAD W/O CONTRAST, 12/03/2020, 12:40.  INDICATIONS: HEADACHE AFTER FALL TODAY.  PROTOCOL:   Standard imaging protocol performed    RADIATION:   MA and/or KV was adjusted to minimize radiation dose.    TECHNIQUE: After obtaining the patient's consent, 106 CT images were obtained without non-ionic intravenous contrast material.  FINDINGS: Encephalomalacia is seen in the posterior cerebral hemispheres bilaterally with evidence for chronic laminar necrosis.  These findings have evolved since the prior 12/3/2020 CT study.  These findings may be the sequelae of prior infarcts and/or be related to prior posterior reversible encephalopathy syndrome (PRES).  Moderate to severe chronic small vessel ischemia/infarction is suggested.  There is a large posterior acute hemorrhagic scalp contusion with a large subgaleal hemorrhagic component.  There may be associated laceration.  Subcutaneous hemorrhage is also seen.  This finding is centered in the midline and involves probably the parieto-occipital scalp.  No acute skull fracture is seen.  No acute intracranial hemorrhage.  No midline shift or acute intracranial herniation syndrome.  There is probably benign external auditory canal debris bilaterally.  There is mild chronic leftward nasal septal deviation.  Minimal mucosal thickening may be present in the paranasal sinuses.  No air-fluid interfaces are seen within the imaged paranasal sinuses.  CONCLUSION:  1. No acute intracranial hemorrhage.  2. No acute skull fracture.  3. No definite acute brain abnormality.  4. A large acute hemorrhagic posterior scalp contusion is seen.  5. Encephalomalacia involves the bilateral posterior cerebral hemispheres in the parieto-occipital regions and may represent old infarcts and/or be the sequelae of prior PRES.  6. Please see above comments for further detail.    JESS VIVAS JR, MD       Electronically Signed and Approved By: JESS HUI  SANGEETHA FONSECA MD on 12/19/2021 at 0:00             CT Cervical Spine Without Contrast    Result Date: 12/19/2021  Narrative: PROCEDURE: CT CERVICAL SPINE WO CONTRAST  COMPARISON: None.  INDICATIONS: NECK PAIN AFTER FALL TODAY.  PROTOCOL:   Standard imaging protocol performed    RADIATION: MA and/or KV were/was adjusted to minimize radiation dose.    TECHNIQUE: After obtaining the patient's consent, multi-planar CT images were created without contrast material.  Study is limited by large body habitus.  EXAM FINDINGS: A routine nonenhanced cervical spine CT was performed. Sagittal and coronal two-dimensional reformations are provided for review. No acute cervical spine fracture or acute malalignment is identified. Small nonspecific bilateral cervical lymph nodes are seen.  There is motion artifact on the study.  There are degenerative changes throughout the imaged spine, probably greatest at C4-5, with chronic retrolisthesis present, estimated 3 mm or slightly less.  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  There is diffuse heterogeneity of the thyroid gland, which is enlarged.  There may be slight substernal extension of the thyroid gland.  The findings are most suggestive of a multinodular goiter.  The thyroid gland contains bilateral foci of calcification, as well.  There is ectasia of the carotid arterial system with narrowing of the posterior, lateral hypopharyngeal airway (which is probably related to the vascular ectasia and large body habitus).  The patient has undergone median sternotomy.  There is lateral curvature of the cervical spine with the convexity to the right, which may be fixed or positional.  CONCLUSION:    No acute cervical spine fracture is seen.  Motion artifact obscures detail on the study.    JESS VIVAS JR, MD       Electronically Signed and Approved By: JESS VIVAS JR, MD on 12/19/2021 at 0:20             CT Facial Bones Without Contrast    Result Date:  12/19/2021  Narrative: PROCEDURE: CT FACIAL BONES WO CONTRAST  COMPARISON: None.  INDICATIONS: BRUISING TO UPPER LIP AND PAIN AFTER FALL TODAY.  PROTOCOL:   Standard imaging protocol performed    RADIATION: Automated exposure control was utilized to minimize radiation dose.  TECHNIQUE: After obtaining the patient's consent, 314 CT images were created without non-ionic intravenous contrast.   EXAM FINDINGS: A routine nonenhanced maxillofacial CT was performed.  Coronal two-dimensional reformations are provided for review. No acute fracture or acute malalignment is identified.  The patient has undergone bilateral cataract extractions with intra-ocular lens implants.  Otherwise, no acute orbital findings are seen.  Degenerative changes involve the temporomandibular joints, greater on the right.  A few opacified left-sided mastoid air cells are seen and may represent chronic congestive and/or inflammatory change.  There may be mild chronic congestive and/or inflammatory change of the imaged paranasal sinuses.  No air-fluid interfaces are seen.  There is slight probably chronic leftward nasal septal deviation.  Acute soft tissue contusion involves the upper lip, such as seen on image 13 of series 6 and image 11 of series 8.  No unintended retained foreign body is seen in this region.  Minimal if any subcutaneous emphysema is identified.  CONCLUSION:    No acute fracture is seen.     JESS VIVAS JR, MD       Electronically Signed and Approved By: JESS VIVAS JR, MD on 12/19/2021 at 0:15               MDM:      The case was discussed between the KORY and myself. Patient  care including, but not limited to ordered imaging, medications, and lab results were reviewed. I then performed the substantive portion of the visit including all aspects of the medical decision making.        Javier Lowe MD  07:33 EST  12/19/21       Javier Lowe MD  12/19/21 0734

## 2022-01-01 ENCOUNTER — APPOINTMENT (OUTPATIENT)
Dept: GENERAL RADIOLOGY | Facility: HOSPITAL | Age: 67
End: 2022-01-01

## 2022-01-01 ENCOUNTER — APPOINTMENT (OUTPATIENT)
Dept: CT IMAGING | Facility: HOSPITAL | Age: 67
End: 2022-01-01

## 2022-01-01 ENCOUNTER — APPOINTMENT (OUTPATIENT)
Dept: CARDIOLOGY | Facility: HOSPITAL | Age: 67
End: 2022-01-01

## 2022-01-01 ENCOUNTER — HOSPITAL ENCOUNTER (INPATIENT)
Facility: HOSPITAL | Age: 67
LOS: 4 days | End: 2022-04-22
Attending: EMERGENCY MEDICINE | Admitting: INTERNAL MEDICINE

## 2022-01-01 ENCOUNTER — APPOINTMENT (OUTPATIENT)
Dept: MRI IMAGING | Facility: HOSPITAL | Age: 67
End: 2022-01-01

## 2022-01-01 ENCOUNTER — HOSPITAL ENCOUNTER (EMERGENCY)
Facility: HOSPITAL | Age: 67
Discharge: HOME | End: 2022-04-11
Attending: EMERGENCY MEDICINE | Admitting: EMERGENCY MEDICINE

## 2022-01-01 VITALS
HEART RATE: 87 BPM | SYSTOLIC BLOOD PRESSURE: 156 MMHG | DIASTOLIC BLOOD PRESSURE: 73 MMHG | WEIGHT: 182.1 LBS | RESPIRATION RATE: 16 BRPM | BODY MASS INDEX: 32.27 KG/M2 | HEIGHT: 63 IN | OXYGEN SATURATION: 95 % | TEMPERATURE: 98.4 F

## 2022-01-01 VITALS
SYSTOLIC BLOOD PRESSURE: 93 MMHG | RESPIRATION RATE: 20 BRPM | BODY MASS INDEX: 39.38 KG/M2 | WEIGHT: 195.33 LBS | DIASTOLIC BLOOD PRESSURE: 79 MMHG | TEMPERATURE: 97.5 F | OXYGEN SATURATION: 82 % | HEIGHT: 59 IN

## 2022-01-01 DIAGNOSIS — N18.9 CHRONIC KIDNEY DISEASE, UNSPECIFIED CKD STAGE: ICD-10-CM

## 2022-01-01 DIAGNOSIS — R13.12 OROPHARYNGEAL DYSPHAGIA: ICD-10-CM

## 2022-01-01 DIAGNOSIS — R41.0 CONFUSION: ICD-10-CM

## 2022-01-01 DIAGNOSIS — I15.9 SECONDARY HYPERTENSION: Primary | ICD-10-CM

## 2022-01-01 DIAGNOSIS — Z78.9 DECREASED ACTIVITIES OF DAILY LIVING (ADL): ICD-10-CM

## 2022-01-01 DIAGNOSIS — I63.511 ACUTE ISCHEMIC RIGHT MCA STROKE: Primary | ICD-10-CM

## 2022-01-01 DIAGNOSIS — R26.2 DIFFICULTY WALKING: ICD-10-CM

## 2022-01-01 LAB
ACETONE BLD QL: NEGATIVE
ALBUMIN SERPL-MCNC: 3 G/DL (ref 3.5–5.2)
ALBUMIN SERPL-MCNC: 3.2 G/DL (ref 3.5–5.2)
ALBUMIN SERPL-MCNC: 3.4 G/DL (ref 3.5–5.2)
ALBUMIN/GLOB SERPL: 0.9 G/DL
ALBUMIN/GLOB SERPL: 1 G/DL
ALBUMIN/GLOB SERPL: 1.1 G/DL
ALP SERPL-CCNC: 112 U/L (ref 39–117)
ALP SERPL-CCNC: 222 U/L (ref 39–117)
ALP SERPL-CCNC: 99 U/L (ref 39–117)
ALT SERPL W P-5'-P-CCNC: 45 U/L (ref 1–33)
ALT SERPL W P-5'-P-CCNC: 6 U/L (ref 1–33)
ALT SERPL W P-5'-P-CCNC: <5 U/L (ref 1–33)
ANION GAP SERPL CALCULATED.3IONS-SCNC: 11.7 MMOL/L (ref 5–15)
ANION GAP SERPL CALCULATED.3IONS-SCNC: 14.4 MMOL/L (ref 5–15)
ANION GAP SERPL CALCULATED.3IONS-SCNC: 9.1 MMOL/L (ref 5–15)
APTT PPP: 49.4 SECONDS (ref 24.2–34.2)
ARTERIAL PATENCY WRIST A: ABNORMAL
ARTERIAL PATENCY WRIST A: POSITIVE
AST SERPL-CCNC: 21 U/L (ref 1–32)
AST SERPL-CCNC: 21 U/L (ref 1–32)
AST SERPL-CCNC: 85 U/L (ref 1–32)
BACTERIA UR QL AUTO: ABNORMAL /HPF
BASE EXCESS BLDA CALC-SCNC: -11.8 MMOL/L (ref -2–2)
BASE EXCESS BLDA CALC-SCNC: -14.3 MMOL/L (ref -2–2)
BASOPHILS # BLD AUTO: 0.04 10*3/MM3 (ref 0–0.2)
BASOPHILS # BLD AUTO: 0.05 10*3/MM3 (ref 0–0.2)
BASOPHILS NFR BLD AUTO: 0.4 % (ref 0–1.5)
BASOPHILS NFR BLD AUTO: 0.5 % (ref 0–1.5)
BDY SITE: ABNORMAL
BDY SITE: ABNORMAL
BH CV ECHO MEAS - AO MAX PG: 42 MMHG
BH CV ECHO MEAS - AO MEAN PG: 26 MMHG
BH CV ECHO MEAS - AO ROOT DIAM: 2.7 CM
BH CV ECHO MEAS - AO V2 MAX: 324 CM/SEC
BH CV ECHO MEAS - AO V2 VTI: 58 CM
BH CV ECHO MEAS - AVA(I,D): 1 CM2
BH CV ECHO MEAS - EDV(MOD-SP2): 58 ML
BH CV ECHO MEAS - EDV(MOD-SP4): 85 ML
BH CV ECHO MEAS - EF(MOD-BP): 52 %
BH CV ECHO MEAS - EF(MOD-BP): 55 %
BH CV ECHO MEAS - ESV(MOD-SP2): 25 ML
BH CV ECHO MEAS - ESV(MOD-SP4): 51 ML
BH CV ECHO MEAS - IVSD: 1.4 CM
BH CV ECHO MEAS - LA DIMENSION(2D): 3.6 CM
BH CV ECHO MEAS - LAT PEAK E' VEL: 5.7 CM/SEC
BH CV ECHO MEAS - LV MAX PG: 5 MMHG
BH CV ECHO MEAS - LV MEAN PG: 3 MMHG
BH CV ECHO MEAS - LV V1 MAX: 111 CM/SEC
BH CV ECHO MEAS - LV V1 VTI: 18 CM
BH CV ECHO MEAS - LVIDD: 4.5 CM
BH CV ECHO MEAS - LVIDS: 3 CM
BH CV ECHO MEAS - LVOT DIAM: 2 CM
BH CV ECHO MEAS - LVPWD: 1.3 CM
BH CV ECHO MEAS - MED PEAK E' VEL: 6.42 CM/SEC
BH CV ECHO MEAS - MV A MAX VEL: 106 CM/SEC
BH CV ECHO MEAS - MV DEC TIME: 241 MSEC
BH CV ECHO MEAS - MV E MAX VEL: 87 CM/SEC
BH CV ECHO MEAS - MV E/A: 0.8
BH CV ECHO MEAS - RVDD: 2.9 CM
BH CV ECHO MEASUREMENTS AVERAGE E/E' RATIO: 14.36
BILIRUB SERPL-MCNC: 0.4 MG/DL (ref 0–1.2)
BILIRUB SERPL-MCNC: 0.9 MG/DL (ref 0–1.2)
BILIRUB SERPL-MCNC: <0.2 MG/DL (ref 0–1.2)
BILIRUB UR QL STRIP: NEGATIVE
BUN SERPL-MCNC: 28 MG/DL (ref 8–23)
BUN SERPL-MCNC: 31 MG/DL (ref 8–23)
BUN SERPL-MCNC: 35 MG/DL (ref 8–23)
BUN/CREAT SERPL: 20.6 (ref 7–25)
BUN/CREAT SERPL: 21.5 (ref 7–25)
BUN/CREAT SERPL: 22.4 (ref 7–25)
CA-I BLDA-SCNC: 1.39 MMOL/L (ref 1.13–1.32)
CALCIUM SPEC-SCNC: 10.1 MG/DL (ref 8.6–10.5)
CALCIUM SPEC-SCNC: 9.3 MG/DL (ref 8.6–10.5)
CALCIUM SPEC-SCNC: 9.8 MG/DL (ref 8.6–10.5)
CHLORIDE BLDA-SCNC: 99 MMOL/L (ref 98–106)
CHLORIDE SERPL-SCNC: 106 MMOL/L (ref 98–107)
CHLORIDE SERPL-SCNC: 98 MMOL/L (ref 98–107)
CHLORIDE SERPL-SCNC: 99 MMOL/L (ref 98–107)
CHOLEST SERPL-MCNC: 408 MG/DL (ref 0–200)
CK MB SERPL-CCNC: 2.55 NG/ML
CK SERPL-CCNC: 59 U/L (ref 20–180)
CK SERPL-CCNC: 82 U/L (ref 20–180)
CLARITY UR: ABNORMAL
CO2 SERPL-SCNC: 21.3 MMOL/L (ref 22–29)
CO2 SERPL-SCNC: 22.6 MMOL/L (ref 22–29)
CO2 SERPL-SCNC: 25.9 MMOL/L (ref 22–29)
COD CRY URNS QL: ABNORMAL /HPF
COHGB MFR BLD: 0.3 % (ref 0–1.5)
COHGB MFR BLD: 0.7 % (ref 0–1.5)
COLOR UR: YELLOW
CREAT SERPL-MCNC: 1.25 MG/DL (ref 0.57–1)
CREAT SERPL-MCNC: 1.44 MG/DL (ref 0.57–1)
CREAT SERPL-MCNC: 1.7 MG/DL (ref 0.57–1)
D-LACTATE SERPL-SCNC: 23.7 MMOL/L (ref 0.5–2)
DEPRECATED RDW RBC AUTO: 43.2 FL (ref 37–54)
DEPRECATED RDW RBC AUTO: 43.7 FL (ref 37–54)
DEPRECATED RDW RBC AUTO: 43.8 FL (ref 37–54)
DEPRECATED RDW RBC AUTO: 45.1 FL (ref 37–54)
EGFRCR SERPLBLD CKD-EPI 2021: 32.5 ML/MIN/1.73
EGFRCR SERPLBLD CKD-EPI 2021: 39.7 ML/MIN/1.73
EGFRCR SERPLBLD CKD-EPI 2021: 47 ML/MIN/1.73
EOSINOPHIL # BLD AUTO: 0.08 10*3/MM3 (ref 0–0.4)
EOSINOPHIL # BLD AUTO: 0.29 10*3/MM3 (ref 0–0.4)
EOSINOPHIL NFR BLD AUTO: 0.8 % (ref 0.3–6.2)
EOSINOPHIL NFR BLD AUTO: 2.6 % (ref 0.3–6.2)
ERYTHROCYTE [DISTWIDTH] IN BLOOD BY AUTOMATED COUNT: 13.2 % (ref 12.3–15.4)
ERYTHROCYTE [DISTWIDTH] IN BLOOD BY AUTOMATED COUNT: 13.6 % (ref 12.3–15.4)
ERYTHROCYTE [DISTWIDTH] IN BLOOD BY AUTOMATED COUNT: 13.6 % (ref 12.3–15.4)
ERYTHROCYTE [DISTWIDTH] IN BLOOD BY AUTOMATED COUNT: 13.8 % (ref 12.3–15.4)
FHHB: 1.7 % (ref 0–5)
FHHB: 10.6 % (ref 0–5)
GAS FLOW AIRWAY: 15 LPM
GAS FLOW AIRWAY: 2.5 LPM
GLOBULIN UR ELPH-MCNC: 2.8 GM/DL
GLOBULIN UR ELPH-MCNC: 3.3 GM/DL
GLOBULIN UR ELPH-MCNC: 3.4 GM/DL
GLUCOSE BLDA-MCNC: 386 MMOL/L (ref 65–99)
GLUCOSE BLDC GLUCOMTR-MCNC: 135 MG/DL (ref 70–99)
GLUCOSE BLDC GLUCOMTR-MCNC: 283 MG/DL (ref 70–99)
GLUCOSE BLDC GLUCOMTR-MCNC: 322 MG/DL (ref 70–99)
GLUCOSE BLDC GLUCOMTR-MCNC: 94 MG/DL (ref 70–99)
GLUCOSE SERPL-MCNC: 113 MG/DL (ref 65–99)
GLUCOSE SERPL-MCNC: 152 MG/DL (ref 65–99)
GLUCOSE SERPL-MCNC: 342 MG/DL (ref 65–99)
GLUCOSE UR STRIP-MCNC: NEGATIVE MG/DL
GRAN CASTS URNS QL MICRO: ABNORMAL /LPF
HBA1C MFR BLD: 8.5 % (ref 4.8–5.6)
HCO3 BLDA-SCNC: 12.6 MMOL/L (ref 22–26)
HCO3 BLDA-SCNC: 17.3 MMOL/L (ref 22–26)
HCT VFR BLD AUTO: 34.1 % (ref 34–46.6)
HCT VFR BLD AUTO: 37.2 % (ref 34–46.6)
HCT VFR BLD AUTO: 39.5 % (ref 34–46.6)
HCT VFR BLD AUTO: 40.6 % (ref 34–46.6)
HCT VFR BLD AUTO: 44.5 % (ref 34–46.6)
HDLC SERPL-MCNC: 31 MG/DL (ref 40–60)
HGB BLD-MCNC: 11 G/DL (ref 12–15.9)
HGB BLD-MCNC: 12.7 G/DL (ref 12–15.9)
HGB BLD-MCNC: 13.6 G/DL (ref 12–15.9)
HGB BLD-MCNC: 13.6 G/DL (ref 12–15.9)
HGB BLD-MCNC: 14.7 G/DL (ref 12–15.9)
HGB BLDA-MCNC: 13.4 G/DL (ref 11.7–14.6)
HGB BLDA-MCNC: 14.8 G/DL (ref 11.7–14.6)
HGB UR QL STRIP.AUTO: ABNORMAL
HOLD SPECIMEN: NORMAL
HYALINE CASTS UR QL AUTO: ABNORMAL /LPF
IMM GRANULOCYTES # BLD AUTO: 0.03 10*3/MM3 (ref 0–0.05)
IMM GRANULOCYTES # BLD AUTO: 0.06 10*3/MM3 (ref 0–0.05)
IMM GRANULOCYTES NFR BLD AUTO: 0.3 % (ref 0–0.5)
IMM GRANULOCYTES NFR BLD AUTO: 0.5 % (ref 0–0.5)
INHALED O2 CONCENTRATION: 100 %
INR PPP: 1.75 (ref 2–3)
INR PPP: 1.99 (ref 2–3)
INR PPP: 2.06 (ref 0.86–1.15)
INR PPP: 2.06 (ref 2–3)
INR PPP: 2.11 (ref 2–3)
INR PPP: 2.5 (ref 2–3)
INR PPP: 2.85 (ref 0.86–1.15)
INR PPP: 6.13 (ref 0.86–1.15)
IVRT: 90 MSEC
KETONES UR QL STRIP: ABNORMAL
LACTATE BLDA-SCNC: 18.07 MMOL/L (ref 0.5–2)
LDLC SERPL CALC-MCNC: 304 MG/DL (ref 0–100)
LDLC/HDLC SERPL: 10.16 {RATIO}
LEFT ATRIUM VOLUME INDEX: 24.6 ML/M2
LEUKOCYTE ESTERASE UR QL STRIP.AUTO: NEGATIVE
LIPASE SERPL-CCNC: 20 U/L (ref 13–60)
LYMPHOCYTES # BLD AUTO: 2.01 10*3/MM3 (ref 0.7–3.1)
LYMPHOCYTES # BLD AUTO: 2.52 10*3/MM3 (ref 0.7–3.1)
LYMPHOCYTES # BLD MANUAL: 0.31 10*3/MM3 (ref 0.7–3.1)
LYMPHOCYTES NFR BLD AUTO: 18.1 % (ref 19.6–45.3)
LYMPHOCYTES NFR BLD AUTO: 25.7 % (ref 19.6–45.3)
LYMPHOCYTES NFR BLD MANUAL: 1 % (ref 5–12)
MAGNESIUM SERPL-MCNC: 1.6 MG/DL (ref 1.6–2.4)
MAGNESIUM SERPL-MCNC: 1.8 MG/DL (ref 1.6–2.4)
MAGNESIUM SERPL-MCNC: 1.8 MG/DL (ref 1.6–2.4)
MAXIMAL PREDICTED HEART RATE: 152 BPM
MAXIMAL PREDICTED HEART RATE: 152 BPM
MCH RBC QN AUTO: 30.2 PG (ref 26.6–33)
MCH RBC QN AUTO: 30.4 PG (ref 26.6–33)
MCH RBC QN AUTO: 30.5 PG (ref 26.6–33)
MCH RBC QN AUTO: 31 PG (ref 26.6–33)
MCHC RBC AUTO-ENTMCNC: 33 G/DL (ref 31.5–35.7)
MCHC RBC AUTO-ENTMCNC: 33.5 G/DL (ref 31.5–35.7)
MCHC RBC AUTO-ENTMCNC: 34.1 G/DL (ref 31.5–35.7)
MCHC RBC AUTO-ENTMCNC: 34.4 G/DL (ref 31.5–35.7)
MCV RBC AUTO: 88.6 FL (ref 79–97)
MCV RBC AUTO: 90.7 FL (ref 79–97)
MCV RBC AUTO: 90.8 FL (ref 79–97)
MCV RBC AUTO: 91.4 FL (ref 79–97)
METHGB BLD QL: 0.1 % (ref 0–1.5)
METHGB BLD QL: 0.4 % (ref 0–1.5)
MODALITY: ABNORMAL
MODALITY: ABNORMAL
MONOCYTES # BLD AUTO: 0.46 10*3/MM3 (ref 0.1–0.9)
MONOCYTES # BLD AUTO: 0.51 10*3/MM3 (ref 0.1–0.9)
MONOCYTES # BLD: 0.15 10*3/MM3 (ref 0.1–0.9)
MONOCYTES NFR BLD AUTO: 4.6 % (ref 5–12)
MONOCYTES NFR BLD AUTO: 4.7 % (ref 5–12)
NEUTROPHILS # BLD AUTO: 14.83 10*3/MM3 (ref 1.7–7)
NEUTROPHILS NFR BLD AUTO: 6.69 10*3/MM3 (ref 1.7–7)
NEUTROPHILS NFR BLD AUTO: 68.1 % (ref 42.7–76)
NEUTROPHILS NFR BLD AUTO: 73.7 % (ref 42.7–76)
NEUTROPHILS NFR BLD AUTO: 8.17 10*3/MM3 (ref 1.7–7)
NEUTROPHILS NFR BLD MANUAL: 65 % (ref 42.7–76)
NEUTS BAND NFR BLD MANUAL: 32 % (ref 0–5)
NITRITE UR QL STRIP: NEGATIVE
NRBC BLD AUTO-RTO: 0 /100 WBC (ref 0–0.2)
NRBC BLD AUTO-RTO: 0 /100 WBC (ref 0–0.2)
NT-PROBNP SERPL-MCNC: 1690 PG/ML (ref 0–900)
NT-PROBNP SERPL-MCNC: 638.7 PG/ML (ref 0–900)
OXYHGB MFR BLDV: 88.6 % (ref 94–99)
OXYHGB MFR BLDV: 97.6 % (ref 94–99)
PCO2 BLDA: 25.7 MM HG (ref 35–45)
PCO2 BLDA: 68.2 MM HG (ref 35–45)
PH BLDA: 7.02 PH UNITS (ref 7.35–7.45)
PH BLDA: 7.31 PH UNITS (ref 7.35–7.45)
PH UR STRIP.AUTO: 5.5 [PH] (ref 5–8)
PLATELET # BLD AUTO: 239 10*3/MM3 (ref 140–450)
PLATELET # BLD AUTO: 278 10*3/MM3 (ref 140–450)
PLATELET # BLD AUTO: 354 10*3/MM3 (ref 140–450)
PLATELET # BLD AUTO: 397 10*3/MM3 (ref 140–450)
PMV BLD AUTO: 10.3 FL (ref 6–12)
PMV BLD AUTO: 9.3 FL (ref 6–12)
PMV BLD AUTO: 9.5 FL (ref 6–12)
PMV BLD AUTO: 9.9 FL (ref 6–12)
PO2 BLD: 240 MM[HG] (ref 0–500)
PO2 BLDA: 239.9 MM HG (ref 80–100)
PO2 BLDA: 58.7 MM HG (ref 80–100)
POTASSIUM BLDA-SCNC: 5.5 MMOL/L (ref 3.5–5)
POTASSIUM SERPL-SCNC: 4.5 MMOL/L (ref 3.5–5.2)
POTASSIUM SERPL-SCNC: 4.8 MMOL/L (ref 3.5–5.2)
POTASSIUM SERPL-SCNC: 5.2 MMOL/L (ref 3.5–5.2)
PROCALCITONIN SERPL-MCNC: 55.7 NG/ML (ref 0–0.25)
PROT SERPL-MCNC: 6 G/DL (ref 6–8.5)
PROT SERPL-MCNC: 6.4 G/DL (ref 6–8.5)
PROT SERPL-MCNC: 6.7 G/DL (ref 6–8.5)
PROT UR QL STRIP: ABNORMAL
PROTHROMBIN TIME: 17.2 SECONDS (ref 9.4–12)
PROTHROMBIN TIME: 19.4 SECONDS (ref 9.4–12)
PROTHROMBIN TIME: 20 SECONDS (ref 9.4–12)
PROTHROMBIN TIME: 20.5 SECONDS (ref 9.4–12)
PROTHROMBIN TIME: 23.6 SECONDS (ref 11.8–14.9)
PROTHROMBIN TIME: 24.1 SECONDS (ref 9.4–12)
PROTHROMBIN TIME: 30.6 SECONDS (ref 11.8–14.9)
PROTHROMBIN TIME: 56.1 SECONDS (ref 11.8–14.9)
QT INTERVAL: 333 MS
QT INTERVAL: 337 MS
QT INTERVAL: 372 MS
QT INTERVAL: 387 MS
QT INTERVAL: 462 MS
RBC # BLD AUTO: 4.1 10*6/MM3 (ref 3.77–5.28)
RBC # BLD AUTO: 4.46 10*6/MM3 (ref 3.77–5.28)
RBC # BLD AUTO: 4.47 10*6/MM3 (ref 3.77–5.28)
RBC # BLD AUTO: 4.87 10*6/MM3 (ref 3.77–5.28)
RBC # UR STRIP: ABNORMAL /HPF
RBC MORPH BLD: NORMAL
REF LAB TEST METHOD: ABNORMAL
SAO2 % BLDCOA: 89.3 % (ref 95–99)
SAO2 % BLDCOA: 98.3 % (ref 95–99)
SARS-COV-2 RNA PNL SPEC NAA+PROBE: NOT DETECTED
SCAN SLIDE: NORMAL
SMALL PLATELETS BLD QL SMEAR: ADEQUATE
SODIUM BLDA-SCNC: 145.1 MMOL/L (ref 136–146)
SODIUM SERPL-SCNC: 132 MMOL/L (ref 136–145)
SODIUM SERPL-SCNC: 135 MMOL/L (ref 136–145)
SODIUM SERPL-SCNC: 141 MMOL/L (ref 136–145)
SP GR UR STRIP: 1.02 (ref 1–1.03)
SQUAMOUS #/AREA URNS HPF: ABNORMAL /HPF
STRESS TARGET HR: 129 BPM
STRESS TARGET HR: 129 BPM
T4 FREE SERPL-MCNC: 0.75 NG/DL (ref 0.93–1.7)
TRIGL SERPL-MCNC: 310 MG/DL (ref 0–150)
TROPONIN T SERPL-MCNC: <0.01 NG/ML (ref 0–0.03)
TSH SERPL DL<=0.05 MIU/L-ACNC: 2.57 UIU/ML (ref 0.27–4.2)
UROBILINOGEN UR QL STRIP: ABNORMAL
VARIANT LYMPHS NFR BLD MANUAL: 2 % (ref 19.6–45.3)
VLDLC SERPL-MCNC: 73 MG/DL (ref 5–40)
WBC # UR STRIP: ABNORMAL /HPF
WBC MORPH BLD: NORMAL
WBC NRBC COR # BLD: 11.09 10*3/MM3 (ref 3.4–10.8)
WBC NRBC COR # BLD: 15.29 10*3/MM3 (ref 3.4–10.8)
WBC NRBC COR # BLD: 9.81 10*3/MM3 (ref 3.4–10.8)
WBC NRBC COR # BLD: 9.82 10*3/MM3 (ref 3.4–10.8)
WHOLE BLOOD HOLD SPECIMEN: NORMAL

## 2022-01-01 PROCEDURE — 36600 WITHDRAWAL OF ARTERIAL BLOOD: CPT | Performed by: PSYCHIATRY & NEUROLOGY

## 2022-01-01 PROCEDURE — 94799 UNLISTED PULMONARY SVC/PX: CPT

## 2022-01-01 PROCEDURE — 84443 ASSAY THYROID STIM HORMONE: CPT | Performed by: EMERGENCY MEDICINE

## 2022-01-01 PROCEDURE — 83690 ASSAY OF LIPASE: CPT | Performed by: EMERGENCY MEDICINE

## 2022-01-01 PROCEDURE — 85610 PROTHROMBIN TIME: CPT | Performed by: EMERGENCY MEDICINE

## 2022-01-01 PROCEDURE — 93010 ELECTROCARDIOGRAM REPORT: CPT | Performed by: INTERNAL MEDICINE

## 2022-01-01 PROCEDURE — 99222 1ST HOSP IP/OBS MODERATE 55: CPT | Performed by: PSYCHIATRY & NEUROLOGY

## 2022-01-01 PROCEDURE — 82009 KETONE BODYS QUAL: CPT | Performed by: EMERGENCY MEDICINE

## 2022-01-01 PROCEDURE — 5A1935Z RESPIRATORY VENTILATION, LESS THAN 24 CONSECUTIVE HOURS: ICD-10-PCS | Performed by: INTERNAL MEDICINE

## 2022-01-01 PROCEDURE — 36620 INSERTION CATHETER ARTERY: CPT | Performed by: PHYSICIAN ASSISTANT

## 2022-01-01 PROCEDURE — 80053 COMPREHEN METABOLIC PANEL: CPT | Performed by: EMERGENCY MEDICINE

## 2022-01-01 PROCEDURE — 85018 HEMOGLOBIN: CPT | Performed by: INTERNAL MEDICINE

## 2022-01-01 PROCEDURE — 70450 CT HEAD/BRAIN W/O DYE: CPT

## 2022-01-01 PROCEDURE — 99285 EMERGENCY DEPT VISIT HI MDM: CPT

## 2022-01-01 PROCEDURE — 36415 COLL VENOUS BLD VENIPUNCTURE: CPT

## 2022-01-01 PROCEDURE — 84439 ASSAY OF FREE THYROXINE: CPT | Performed by: EMERGENCY MEDICINE

## 2022-01-01 PROCEDURE — 93306 TTE W/DOPPLER COMPLETE: CPT

## 2022-01-01 PROCEDURE — 94761 N-INVAS EAR/PLS OXIMETRY MLT: CPT

## 2022-01-01 PROCEDURE — 25010000002 ENOXAPARIN PER 10 MG: Performed by: INTERNAL MEDICINE

## 2022-01-01 PROCEDURE — 85610 PROTHROMBIN TIME: CPT | Performed by: INTERNAL MEDICINE

## 2022-01-01 PROCEDURE — 63710000001 ONDANSETRON ODT 4 MG TABLET DISPERSIBLE: Performed by: EMERGENCY MEDICINE

## 2022-01-01 PROCEDURE — 25010000002 LORAZEPAM PER 2 MG: Performed by: INTERNAL MEDICINE

## 2022-01-01 PROCEDURE — 96374 THER/PROPH/DIAG INJ IV PUSH: CPT

## 2022-01-01 PROCEDURE — 99232 SBSQ HOSP IP/OBS MODERATE 35: CPT | Performed by: PSYCHIATRY & NEUROLOGY

## 2022-01-01 PROCEDURE — 76937 US GUIDE VASCULAR ACCESS: CPT | Performed by: PHYSICIAN ASSISTANT

## 2022-01-01 PROCEDURE — 82375 ASSAY CARBOXYHB QUANT: CPT | Performed by: PSYCHIATRY & NEUROLOGY

## 2022-01-01 PROCEDURE — 82962 GLUCOSE BLOOD TEST: CPT

## 2022-01-01 PROCEDURE — 83735 ASSAY OF MAGNESIUM: CPT | Performed by: INTERNAL MEDICINE

## 2022-01-01 PROCEDURE — 25010000002 MORPHINE PER 10 MG: Performed by: PHYSICIAN ASSISTANT

## 2022-01-01 PROCEDURE — 93312 ECHO TRANSESOPHAGEAL: CPT

## 2022-01-01 PROCEDURE — 02HV33Z INSERTION OF INFUSION DEVICE INTO SUPERIOR VENA CAVA, PERCUTANEOUS APPROACH: ICD-10-PCS | Performed by: INTERNAL MEDICINE

## 2022-01-01 PROCEDURE — 84145 PROCALCITONIN (PCT): CPT | Performed by: INTERNAL MEDICINE

## 2022-01-01 PROCEDURE — 25010000002 MAGNESIUM SULFATE PER 500 MG OF MAGNESIUM: Performed by: PHYSICIAN ASSISTANT

## 2022-01-01 PROCEDURE — 83050 HGB METHEMOGLOBIN QUAN: CPT | Performed by: INTERNAL MEDICINE

## 2022-01-01 PROCEDURE — 71045 X-RAY EXAM CHEST 1 VIEW: CPT

## 2022-01-01 PROCEDURE — 84484 ASSAY OF TROPONIN QUANT: CPT | Performed by: EMERGENCY MEDICINE

## 2022-01-01 PROCEDURE — 97535 SELF CARE MNGMENT TRAINING: CPT

## 2022-01-01 PROCEDURE — 25010000002 MIDAZOLAM PER 1 MG: Performed by: INTERNAL MEDICINE

## 2022-01-01 PROCEDURE — 85007 BL SMEAR W/DIFF WBC COUNT: CPT | Performed by: INTERNAL MEDICINE

## 2022-01-01 PROCEDURE — 70551 MRI BRAIN STEM W/O DYE: CPT

## 2022-01-01 PROCEDURE — 36600 WITHDRAWAL OF ARTERIAL BLOOD: CPT | Performed by: INTERNAL MEDICINE

## 2022-01-01 PROCEDURE — 85027 COMPLETE CBC AUTOMATED: CPT | Performed by: INTERNAL MEDICINE

## 2022-01-01 PROCEDURE — 71046 X-RAY EXAM CHEST 2 VIEWS: CPT

## 2022-01-01 PROCEDURE — 25010000002 EPINEPHRINE 1 MG/10ML SOLUTION PREFILLED SYRINGE: Performed by: PHYSICIAN ASSISTANT

## 2022-01-01 PROCEDURE — 93005 ELECTROCARDIOGRAM TRACING: CPT | Performed by: INTERNAL MEDICINE

## 2022-01-01 PROCEDURE — 85025 COMPLETE CBC W/AUTO DIFF WBC: CPT | Performed by: EMERGENCY MEDICINE

## 2022-01-01 PROCEDURE — 85014 HEMATOCRIT: CPT | Performed by: INTERNAL MEDICINE

## 2022-01-01 PROCEDURE — 0 MEPERIDINE PER 100 MG: Performed by: INTERNAL MEDICINE

## 2022-01-01 PROCEDURE — 03HY32Z INSERTION OF MONITORING DEVICE INTO UPPER ARTERY, PERCUTANEOUS APPROACH: ICD-10-PCS | Performed by: INTERNAL MEDICINE

## 2022-01-01 PROCEDURE — 83605 ASSAY OF LACTIC ACID: CPT | Performed by: INTERNAL MEDICINE

## 2022-01-01 PROCEDURE — 83880 ASSAY OF NATRIURETIC PEPTIDE: CPT | Performed by: INTERNAL MEDICINE

## 2022-01-01 PROCEDURE — 94002 VENT MGMT INPAT INIT DAY: CPT

## 2022-01-01 PROCEDURE — 97116 GAIT TRAINING THERAPY: CPT

## 2022-01-01 PROCEDURE — 82805 BLOOD GASES W/O2 SATURATION: CPT | Performed by: PSYCHIATRY & NEUROLOGY

## 2022-01-01 PROCEDURE — 80061 LIPID PANEL: CPT | Performed by: INTERNAL MEDICINE

## 2022-01-01 PROCEDURE — 82550 ASSAY OF CK (CPK): CPT | Performed by: EMERGENCY MEDICINE

## 2022-01-01 PROCEDURE — 85025 COMPLETE CBC W/AUTO DIFF WBC: CPT | Performed by: INTERNAL MEDICINE

## 2022-01-01 PROCEDURE — 83050 HGB METHEMOGLOBIN QUAN: CPT | Performed by: PSYCHIATRY & NEUROLOGY

## 2022-01-01 PROCEDURE — 83036 HEMOGLOBIN GLYCOSYLATED A1C: CPT | Performed by: INTERNAL MEDICINE

## 2022-01-01 PROCEDURE — 82553 CREATINE MB FRACTION: CPT | Performed by: INTERNAL MEDICINE

## 2022-01-01 PROCEDURE — 93005 ELECTROCARDIOGRAM TRACING: CPT | Performed by: EMERGENCY MEDICINE

## 2022-01-01 PROCEDURE — 84484 ASSAY OF TROPONIN QUANT: CPT | Performed by: INTERNAL MEDICINE

## 2022-01-01 PROCEDURE — 97161 PT EVAL LOW COMPLEX 20 MIN: CPT

## 2022-01-01 PROCEDURE — 36556 INSERT NON-TUNNEL CV CATH: CPT | Performed by: PHYSICIAN ASSISTANT

## 2022-01-01 PROCEDURE — 82805 BLOOD GASES W/O2 SATURATION: CPT | Performed by: INTERNAL MEDICINE

## 2022-01-01 PROCEDURE — 81001 URINALYSIS AUTO W/SCOPE: CPT | Performed by: EMERGENCY MEDICINE

## 2022-01-01 PROCEDURE — 25010000002 DIGOXIN PER 500 MCG: Performed by: INTERNAL MEDICINE

## 2022-01-01 PROCEDURE — 97165 OT EVAL LOW COMPLEX 30 MIN: CPT

## 2022-01-01 PROCEDURE — 94760 N-INVAS EAR/PLS OXIMETRY 1: CPT

## 2022-01-01 PROCEDURE — U0004 COV-19 TEST NON-CDC HGH THRU: HCPCS | Performed by: INTERNAL MEDICINE

## 2022-01-01 PROCEDURE — 83880 ASSAY OF NATRIURETIC PEPTIDE: CPT | Performed by: EMERGENCY MEDICINE

## 2022-01-01 PROCEDURE — 5A12012 PERFORMANCE OF CARDIAC OUTPUT, SINGLE, MANUAL: ICD-10-PCS | Performed by: INTERNAL MEDICINE

## 2022-01-01 PROCEDURE — 82375 ASSAY CARBOXYHB QUANT: CPT | Performed by: INTERNAL MEDICINE

## 2022-01-01 PROCEDURE — 80053 COMPREHEN METABOLIC PANEL: CPT | Performed by: INTERNAL MEDICINE

## 2022-01-01 PROCEDURE — 82550 ASSAY OF CK (CPK): CPT | Performed by: INTERNAL MEDICINE

## 2022-01-01 PROCEDURE — 85730 THROMBOPLASTIN TIME PARTIAL: CPT | Performed by: INTERNAL MEDICINE

## 2022-01-01 PROCEDURE — 25010000002 HYDRALAZINE PER 20 MG: Performed by: EMERGENCY MEDICINE

## 2022-01-01 PROCEDURE — 83735 ASSAY OF MAGNESIUM: CPT | Performed by: EMERGENCY MEDICINE

## 2022-01-01 PROCEDURE — 92610 EVALUATE SWALLOWING FUNCTION: CPT

## 2022-01-01 PROCEDURE — 0BH17EZ INSERTION OF ENDOTRACHEAL AIRWAY INTO TRACHEA, VIA NATURAL OR ARTIFICIAL OPENING: ICD-10-PCS | Performed by: INTERNAL MEDICINE

## 2022-01-01 PROCEDURE — 93005 ELECTROCARDIOGRAM TRACING: CPT

## 2022-01-01 PROCEDURE — 99283 EMERGENCY DEPT VISIT LOW MDM: CPT

## 2022-01-01 PROCEDURE — 92950 HEART/LUNG RESUSCITATION CPR: CPT

## 2022-01-01 RX ORDER — OLOPATADINE HYDROCHLORIDE 1 MG/ML
1 SOLUTION/ DROPS OPHTHALMIC 2 TIMES DAILY
COMMUNITY

## 2022-01-01 RX ORDER — LORAZEPAM 0.5 MG/1
1 TABLET ORAL
Status: DISCONTINUED | OUTPATIENT
Start: 2022-01-01 | End: 2022-01-01 | Stop reason: HOSPADM

## 2022-01-01 RX ORDER — CALCIUM CHLORIDE 100 MG/ML
INJECTION INTRAVENOUS; INTRAVENTRICULAR
Status: COMPLETED | OUTPATIENT
Start: 2022-01-01 | End: 2022-01-01

## 2022-01-01 RX ORDER — INSULIN GLARGINE 100 [IU]/ML
12 INJECTION, SOLUTION SUBCUTANEOUS 2 TIMES DAILY
COMMUNITY

## 2022-01-01 RX ORDER — MIDAZOLAM HYDROCHLORIDE 1 MG/ML
INJECTION INTRAMUSCULAR; INTRAVENOUS
Status: COMPLETED | OUTPATIENT
Start: 2022-01-01 | End: 2022-01-01

## 2022-01-01 RX ORDER — DILTIAZEM HCL IN NACL,ISO-OSM 125 MG/125
10 PLASTIC BAG, INJECTION (ML) INTRAVENOUS ONCE
Status: DISCONTINUED | OUTPATIENT
Start: 2022-01-01 | End: 2022-01-01

## 2022-01-01 RX ORDER — ONDANSETRON 2 MG/ML
4 INJECTION INTRAMUSCULAR; INTRAVENOUS ONCE
Status: DISCONTINUED | OUTPATIENT
Start: 2022-01-01 | End: 2022-01-01

## 2022-01-01 RX ORDER — METOPROLOL SUCCINATE 25 MG/1
25 TABLET, EXTENDED RELEASE ORAL DAILY
COMMUNITY

## 2022-01-01 RX ORDER — PANTOPRAZOLE SODIUM 40 MG/1
40 TABLET, DELAYED RELEASE ORAL EVERY 12 HOURS PRN
Status: DISCONTINUED | OUTPATIENT
Start: 2022-01-01 | End: 2022-01-01

## 2022-01-01 RX ORDER — LORAZEPAM 2 MG/ML
1 CONCENTRATE ORAL
Status: DISCONTINUED | OUTPATIENT
Start: 2022-01-01 | End: 2022-01-01 | Stop reason: HOSPADM

## 2022-01-01 RX ORDER — LORAZEPAM 0.5 MG/1
2 TABLET ORAL
Status: DISCONTINUED | OUTPATIENT
Start: 2022-01-01 | End: 2022-01-01 | Stop reason: HOSPADM

## 2022-01-01 RX ORDER — ACETAMINOPHEN 325 MG/1
650 TABLET ORAL ONCE
Status: COMPLETED | OUTPATIENT
Start: 2022-01-01 | End: 2022-01-01

## 2022-01-01 RX ORDER — WARFARIN SODIUM 1 MG/1
TABLET ORAL
Status: CANCELLED | OUTPATIENT
Start: 2022-01-01

## 2022-01-01 RX ORDER — ACETAMINOPHEN 160 MG/5ML
650 SOLUTION ORAL EVERY 4 HOURS PRN
Status: DISCONTINUED | OUTPATIENT
Start: 2022-01-01 | End: 2022-01-01 | Stop reason: HOSPADM

## 2022-01-01 RX ORDER — LORAZEPAM 2 MG/ML
1 INJECTION INTRAMUSCULAR
Status: DISCONTINUED | OUTPATIENT
Start: 2022-01-01 | End: 2022-01-01 | Stop reason: HOSPADM

## 2022-01-01 RX ORDER — SODIUM CHLORIDE 0.9 % (FLUSH) 0.9 %
10 SYRINGE (ML) INJECTION EVERY 12 HOURS SCHEDULED
Status: DISCONTINUED | OUTPATIENT
Start: 2022-01-01 | End: 2022-01-01 | Stop reason: HOSPADM

## 2022-01-01 RX ORDER — ACETAMINOPHEN 650 MG/1
650 SUPPOSITORY RECTAL EVERY 4 HOURS PRN
Status: DISCONTINUED | OUTPATIENT
Start: 2022-01-01 | End: 2022-01-01 | Stop reason: HOSPADM

## 2022-01-01 RX ORDER — DIGOXIN 0.25 MG/ML
250 INJECTION INTRAMUSCULAR; INTRAVENOUS ONCE
Status: DISCONTINUED | OUTPATIENT
Start: 2022-01-01 | End: 2022-01-01

## 2022-01-01 RX ORDER — LORAZEPAM 2 MG/ML
2 CONCENTRATE ORAL
Status: DISCONTINUED | OUTPATIENT
Start: 2022-01-01 | End: 2022-01-01 | Stop reason: HOSPADM

## 2022-01-01 RX ORDER — DILTIAZEM HCL IN NACL,ISO-OSM 125 MG/125
5-15 PLASTIC BAG, INJECTION (ML) INTRAVENOUS
Status: DISCONTINUED | OUTPATIENT
Start: 2022-01-01 | End: 2022-01-01

## 2022-01-01 RX ORDER — LORAZEPAM 2 MG/ML
0.5 INJECTION INTRAMUSCULAR
Status: DISCONTINUED | OUTPATIENT
Start: 2022-01-01 | End: 2022-01-01 | Stop reason: HOSPADM

## 2022-01-01 RX ORDER — LORAZEPAM 2 MG/ML
0.5 CONCENTRATE ORAL
Status: DISCONTINUED | OUTPATIENT
Start: 2022-01-01 | End: 2022-01-01 | Stop reason: HOSPADM

## 2022-01-01 RX ORDER — SODIUM CHLORIDE 0.9 % (FLUSH) 0.9 %
10 SYRINGE (ML) INJECTION AS NEEDED
Status: DISCONTINUED | OUTPATIENT
Start: 2022-01-01 | End: 2022-01-01 | Stop reason: HOSPADM

## 2022-01-01 RX ORDER — MEPERIDINE HYDROCHLORIDE 25 MG/ML
INJECTION INTRAMUSCULAR; INTRAVENOUS; SUBCUTANEOUS
Status: COMPLETED | OUTPATIENT
Start: 2022-01-01 | End: 2022-01-01

## 2022-01-01 RX ORDER — ACETAMINOPHEN 325 MG/1
650 TABLET ORAL EVERY 4 HOURS PRN
Status: DISCONTINUED | OUTPATIENT
Start: 2022-01-01 | End: 2022-01-01 | Stop reason: HOSPADM

## 2022-01-01 RX ORDER — MAGNESIUM SULFATE HEPTAHYDRATE 500 MG/ML
INJECTION, SOLUTION INTRAMUSCULAR; INTRAVENOUS
Status: COMPLETED | OUTPATIENT
Start: 2022-01-01 | End: 2022-01-01

## 2022-01-01 RX ORDER — WARFARIN SODIUM 3 MG/1
3 TABLET ORAL
Status: COMPLETED | OUTPATIENT
Start: 2022-01-01 | End: 2022-01-01

## 2022-01-01 RX ORDER — FERUMOXYTOL 510 MG/17ML
510 INJECTION INTRAVENOUS ONCE
COMMUNITY

## 2022-01-01 RX ORDER — ASPIRIN 300 MG/1
300 SUPPOSITORY RECTAL DAILY
Status: DISCONTINUED | OUTPATIENT
Start: 2022-01-01 | End: 2022-01-01

## 2022-01-01 RX ORDER — LORAZEPAM 2 MG/ML
2 INJECTION INTRAMUSCULAR
Status: DISCONTINUED | OUTPATIENT
Start: 2022-01-01 | End: 2022-01-01 | Stop reason: HOSPADM

## 2022-01-01 RX ORDER — ONDANSETRON 4 MG/1
4 TABLET, ORALLY DISINTEGRATING ORAL ONCE
Status: COMPLETED | OUTPATIENT
Start: 2022-01-01 | End: 2022-01-01

## 2022-01-01 RX ORDER — NOREPINEPHRINE BIT/0.9 % NACL 16MG/250ML
INFUSION BOTTLE (ML) INTRAVENOUS
Status: DISCONTINUED
Start: 2022-01-01 | End: 2022-01-01 | Stop reason: HOSPADM

## 2022-01-01 RX ORDER — AMLODIPINE BESYLATE 5 MG/1
5 TABLET ORAL
Status: DISCONTINUED | OUTPATIENT
Start: 2022-01-01 | End: 2022-01-01

## 2022-01-01 RX ORDER — LOSARTAN POTASSIUM 25 MG/1
25 TABLET ORAL 2 TIMES DAILY
Status: DISCONTINUED | OUTPATIENT
Start: 2022-01-01 | End: 2022-01-01

## 2022-01-01 RX ORDER — NOREPINEPHRINE BIT/0.9 % NACL 8 MG/250ML
.02-.3 INFUSION BOTTLE (ML) INTRAVENOUS
Status: DISCONTINUED | OUTPATIENT
Start: 2022-01-01 | End: 2022-01-01

## 2022-01-01 RX ORDER — WARFARIN SODIUM 1 MG/1
1 TABLET ORAL
COMMUNITY

## 2022-01-01 RX ORDER — NITROGLYCERIN 0.4 MG/1
0.4 TABLET SUBLINGUAL
Status: DISCONTINUED | OUTPATIENT
Start: 2022-01-01 | End: 2022-01-01

## 2022-01-01 RX ORDER — LORAZEPAM 0.5 MG/1
0.5 TABLET ORAL
Status: DISCONTINUED | OUTPATIENT
Start: 2022-01-01 | End: 2022-01-01 | Stop reason: HOSPADM

## 2022-01-01 RX ORDER — METOPROLOL SUCCINATE 25 MG/1
25 TABLET, EXTENDED RELEASE ORAL EVERY 12 HOURS SCHEDULED
Status: DISCONTINUED | OUTPATIENT
Start: 2022-01-01 | End: 2022-01-01

## 2022-01-01 RX ORDER — ASPIRIN 81 MG/1
324 TABLET, CHEWABLE ORAL DAILY
Status: DISCONTINUED | OUTPATIENT
Start: 2022-01-01 | End: 2022-01-01

## 2022-01-01 RX ORDER — DIGOXIN 0.25 MG/ML
500 INJECTION INTRAMUSCULAR; INTRAVENOUS ONCE
Status: COMPLETED | OUTPATIENT
Start: 2022-01-01 | End: 2022-01-01

## 2022-01-01 RX ORDER — ASPIRIN 325 MG
325 TABLET ORAL DAILY
Status: DISCONTINUED | OUTPATIENT
Start: 2022-01-01 | End: 2022-01-01

## 2022-01-01 RX ORDER — HYDRALAZINE HYDROCHLORIDE 20 MG/ML
20 INJECTION INTRAMUSCULAR; INTRAVENOUS ONCE
Status: COMPLETED | OUTPATIENT
Start: 2022-01-01 | End: 2022-01-01

## 2022-01-01 RX ORDER — ASPIRIN 81 MG/1
81 TABLET ORAL DAILY
Status: DISCONTINUED | OUTPATIENT
Start: 2022-01-01 | End: 2022-01-01

## 2022-01-01 RX ORDER — ONDANSETRON 4 MG/1
4 TABLET, FILM COATED ORAL EVERY 8 HOURS PRN
COMMUNITY

## 2022-01-01 RX ORDER — DIPHENOXYLATE HYDROCHLORIDE AND ATROPINE SULFATE 2.5; .025 MG/1; MG/1
1 TABLET ORAL
Status: DISCONTINUED | OUTPATIENT
Start: 2022-01-01 | End: 2022-01-01 | Stop reason: HOSPADM

## 2022-01-01 RX ORDER — EPINEPHRINE 0.1 MG/ML
SYRINGE (ML) INJECTION
Status: COMPLETED | OUTPATIENT
Start: 2022-01-01 | End: 2022-01-01

## 2022-01-01 RX ORDER — ATORVASTATIN CALCIUM 40 MG/1
80 TABLET, FILM COATED ORAL NIGHTLY
Status: DISCONTINUED | OUTPATIENT
Start: 2022-01-01 | End: 2022-01-01

## 2022-01-01 RX ORDER — MORPHINE SULFATE 2 MG/ML
2 INJECTION, SOLUTION INTRAMUSCULAR; INTRAVENOUS
Status: DISCONTINUED | OUTPATIENT
Start: 2022-01-01 | End: 2022-01-01 | Stop reason: HOSPADM

## 2022-01-01 RX ORDER — AMLODIPINE BESYLATE 5 MG/1
5 TABLET ORAL DAILY
Qty: 20 TABLET | Refills: 0 | Status: SHIPPED | OUTPATIENT
Start: 2022-01-01

## 2022-01-01 RX ADMIN — ENOXAPARIN SODIUM 80 MG: 100 INJECTION SUBCUTANEOUS at 15:56

## 2022-01-01 RX ADMIN — ASPIRIN 81 MG: 81 TABLET, COATED ORAL at 12:15

## 2022-01-01 RX ADMIN — ENOXAPARIN SODIUM 80 MG: 100 INJECTION SUBCUTANEOUS at 00:43

## 2022-01-01 RX ADMIN — Medication 10 ML: at 08:25

## 2022-01-01 RX ADMIN — SODIUM BICARBONATE 50 MEQ: 84 INJECTION INTRAVENOUS at 16:16

## 2022-01-01 RX ADMIN — Medication 10 ML: at 08:02

## 2022-01-01 RX ADMIN — SODIUM CHLORIDE, POTASSIUM CHLORIDE, SODIUM LACTATE AND CALCIUM CHLORIDE 1000 ML: 600; 310; 30; 20 INJECTION, SOLUTION INTRAVENOUS at 17:45

## 2022-01-01 RX ADMIN — Medication 10 MG/HR: at 13:53

## 2022-01-01 RX ADMIN — ATORVASTATIN CALCIUM 80 MG: 40 TABLET, FILM COATED ORAL at 20:24

## 2022-01-01 RX ADMIN — Medication 10 ML: at 09:57

## 2022-01-01 RX ADMIN — Medication 10 ML: at 01:28

## 2022-01-01 RX ADMIN — LORAZEPAM 2 MG: 2 INJECTION INTRAMUSCULAR; INTRAVENOUS at 18:19

## 2022-01-01 RX ADMIN — Medication 10 ML: at 20:53

## 2022-01-01 RX ADMIN — ASPIRIN 81 MG CHEWABLE TABLET 324 MG: 81 TABLET CHEWABLE at 10:26

## 2022-01-01 RX ADMIN — ATORVASTATIN CALCIUM 80 MG: 40 TABLET, FILM COATED ORAL at 20:39

## 2022-01-01 RX ADMIN — MAGNESIUM SULFATE HEPTAHYDRATE 1 G: 500 INJECTION, SOLUTION INTRAMUSCULAR; INTRAVENOUS at 16:14

## 2022-01-01 RX ADMIN — EPINEPHRINE 1 MG: 0.1 INJECTION INTRACARDIAC; INTRAVENOUS at 16:18

## 2022-01-01 RX ADMIN — BENZOCAINE 1 SPRAY: 200 SPRAY DENTAL; ORAL; PERIODONTAL at 14:45

## 2022-01-01 RX ADMIN — MORPHINE SULFATE 2 MG: 2 INJECTION, SOLUTION INTRAMUSCULAR; INTRAVENOUS at 18:37

## 2022-01-01 RX ADMIN — Medication 0.3 MCG/KG/MIN: at 17:48

## 2022-01-01 RX ADMIN — MEPERIDINE HYDROCHLORIDE 25 MG: 25 INJECTION INTRAMUSCULAR; INTRAVENOUS; SUBCUTANEOUS at 15:00

## 2022-01-01 RX ADMIN — ASPIRIN 81 MG CHEWABLE TABLET 324 MG: 81 TABLET CHEWABLE at 20:39

## 2022-01-01 RX ADMIN — DIGOXIN 500 MCG: 250 INJECTION, SOLUTION INTRAMUSCULAR; INTRAVENOUS; PARENTERAL at 12:28

## 2022-01-01 RX ADMIN — EPINEPHRINE 1 MG: 0.1 INJECTION INTRACARDIAC; INTRAVENOUS at 16:15

## 2022-01-01 RX ADMIN — WARFARIN SODIUM 7 MG: 6 TABLET ORAL at 18:26

## 2022-01-01 RX ADMIN — MIDAZOLAM HYDROCHLORIDE 2 MG: 1 INJECTION, SOLUTION INTRAMUSCULAR; INTRAVENOUS at 14:55

## 2022-01-01 RX ADMIN — ATORVASTATIN CALCIUM 80 MG: 40 TABLET, FILM COATED ORAL at 20:53

## 2022-01-01 RX ADMIN — LOSARTAN POTASSIUM 25 MG: 25 TABLET, FILM COATED ORAL at 20:39

## 2022-01-01 RX ADMIN — Medication 10 ML: at 20:24

## 2022-01-01 RX ADMIN — ACETAMINOPHEN 650 MG: 325 TABLET ORAL at 20:22

## 2022-01-01 RX ADMIN — MIDAZOLAM HYDROCHLORIDE 4 MG: 1 INJECTION, SOLUTION INTRAMUSCULAR; INTRAVENOUS at 14:45

## 2022-01-01 RX ADMIN — CALCIUM CHLORIDE 1 G: 100 INJECTION INTRAVENOUS; INTRAVENTRICULAR at 16:13

## 2022-01-01 RX ADMIN — ASPIRIN 325 MG ORAL TABLET 325 MG: 325 PILL ORAL at 08:25

## 2022-01-01 RX ADMIN — ENOXAPARIN SODIUM 80 MG: 100 INJECTION SUBCUTANEOUS at 12:16

## 2022-01-01 RX ADMIN — ENOXAPARIN SODIUM 80 MG: 100 INJECTION SUBCUTANEOUS at 11:54

## 2022-01-01 RX ADMIN — ENOXAPARIN SODIUM 80 MG: 100 INJECTION SUBCUTANEOUS at 00:52

## 2022-01-01 RX ADMIN — SODIUM BICARBONATE 50 MEQ: 84 INJECTION INTRAVENOUS at 16:31

## 2022-01-01 RX ADMIN — AMLODIPINE BESYLATE 5 MG: 5 TABLET ORAL at 12:16

## 2022-01-01 RX ADMIN — Medication 10 ML: at 20:39

## 2022-01-01 RX ADMIN — SODIUM BICARBONATE 50 MEQ: 84 INJECTION INTRAVENOUS at 16:09

## 2022-01-01 RX ADMIN — ENOXAPARIN SODIUM 80 MG: 100 INJECTION SUBCUTANEOUS at 01:27

## 2022-01-01 RX ADMIN — WHITE PETROLATUM 1 APPLICATION: 1.75 OINTMENT TOPICAL at 12:31

## 2022-01-01 RX ADMIN — EPINEPHRINE 1 MG: 0.1 INJECTION INTRACARDIAC; INTRAVENOUS at 16:30

## 2022-01-01 RX ADMIN — LOSARTAN POTASSIUM 25 MG: 25 TABLET, FILM COATED ORAL at 10:26

## 2022-01-01 RX ADMIN — HYDRALAZINE HYDROCHLORIDE 20 MG: 20 INJECTION INTRAMUSCULAR; INTRAVENOUS at 20:10

## 2022-01-01 RX ADMIN — ONDANSETRON 4 MG: 4 TABLET, ORALLY DISINTEGRATING ORAL at 20:56

## 2022-01-01 RX ADMIN — MEPERIDINE HYDROCHLORIDE 25 MG: 25 INJECTION INTRAMUSCULAR; INTRAVENOUS; SUBCUTANEOUS at 14:50

## 2022-01-01 RX ADMIN — ASPIRIN 325 MG ORAL TABLET 325 MG: 325 PILL ORAL at 09:57

## 2022-01-01 RX ADMIN — Medication 5 MG/HR: at 13:52

## 2022-01-01 RX ADMIN — ASPIRIN 325 MG ORAL TABLET 325 MG: 325 PILL ORAL at 08:02

## 2022-01-01 RX ADMIN — LOSARTAN POTASSIUM 25 MG: 25 TABLET, FILM COATED ORAL at 12:15

## 2022-01-01 RX ADMIN — EPINEPHRINE 1 MG: 0.1 INJECTION INTRACARDIAC; INTRAVENOUS at 16:08

## 2022-01-01 RX ADMIN — SODIUM BICARBONATE 50 MEQ: 84 INJECTION INTRAVENOUS at 16:12

## 2022-01-01 RX ADMIN — WARFARIN SODIUM 3 MG: 3 TABLET ORAL at 18:00

## 2022-01-01 RX ADMIN — ATORVASTATIN CALCIUM 80 MG: 40 TABLET, FILM COATED ORAL at 01:27

## 2022-01-01 RX ADMIN — AMLODIPINE BESYLATE 5 MG: 5 TABLET ORAL at 10:26

## 2022-01-01 RX ADMIN — WARFARIN SODIUM 7 MG: 6 TABLET ORAL at 17:59

## 2022-01-01 RX ADMIN — SODIUM BICARBONATE 50 MEQ: 84 INJECTION INTRAVENOUS at 16:32

## 2022-01-01 RX ADMIN — Medication 10 ML: at 10:27

## 2022-01-01 RX ADMIN — MORPHINE SULFATE 2 MG: 2 INJECTION, SOLUTION INTRAMUSCULAR; INTRAVENOUS at 18:20

## 2022-04-11 NOTE — ED PROVIDER NOTES
Time: 6:16 PM EDT  Arrived by: private car  Chief Complaint: hypertension  History provided by: Patient  History is limited by: N/A     History of Present Illness:  Patient is a 66 y.o. year old female who presents to the emergency department with chest pain and hypertension has gotten worse over the past day.  Patient denies fever and chills.  She reports that her blood pressure has been uncontrolled at home despite her compliance to her medications.  Patient denies subjective neurological deficit including numbness, tingling, and motor weakness.  Patient denies dysuria but does report urinary frequency.  Patient has no abdominal pain.  Patient has no nausea, vomiting, or diaphoresis.  Patient denies leg pain and swelling.      Hypertension  Severity:  Mild  Timing:  Constant  Progression:  Worsening  Chronicity:  Recurrent  Relieved by:  Nothing  Worsened by:  Nothing  Associated symptoms: no abdominal pain, no chest pain, no fever, no headaches, no nausea, no palpitations, no shortness of breath and not vomiting        Similar Symptoms Previously: yes  Recently seen: no      Patient Care Team  Primary Care Provider: Angelika Nathan PA-C    Past Medical History:     Allergies   Allergen Reactions   • Azithromycin Rash   • Clarithromycin Rash   • Contrast Dye Rash   • Loratadine Rash   • Macrolides And Ketolides Rash   • Penicillins Rash     Past Medical History:   Diagnosis Date   • Anemia    • Arthralgia of right hand 10/22/2018   • Arthritis    • Bladder disorder    • Blood disease    • Chest pain    • Chronic obstructive pulmonary disease (HCC)    • Congestive heart failure (CHF) ([facility])    • COPD (chronic obstructive pulmonary disease) ([facility])    • Diabetes ([facility])    • Hearing loss    • Heart attack ([facility])    • Hyperlipemia    • Kidney disease    • Limb swelling    • Non Hodgkin's lymphoma (HCC)         • Obstructive sleep apnea    • Osteoarthritis of carpometacarpal (CMC) joint of right thumb 10/22/2018   • Otitis  media    • Parkinson's disease (HCC)    • Rectal bleeding    • Reflux esophagitis    • Right carpal tunnel syndrome 10/22/2018   • Right wrist pain 10/22/2018   • Seasonal allergies    • Seizure (HCC)    • Shortness of breath    • Stroke (cerebrum) ([facility])    • Thyroid disorder      Past Surgical History:   Procedure Laterality Date   • CARDIAC SURGERY     • OTHER SURGICAL HISTORY      HEART VALVES     Family History   Problem Relation Age of Onset   • Diabetes Mother    • Arthritis Mother    • Cancer Father    • Cancer Sister    • Diabetes Sister    • Arthritis Sister    • Diabetes Brother    • Arthritis Brother    • Cancer Daughter    • Arthritis Daughter        Home Medications:  Prior to Admission medications    Medication Sig Start Date End Date Taking? Authorizing Provider   carbidopa-levodopa (SINEMET)  MG per tablet Take 1 tablet by mouth 3 (Three) Times a Day. 7/17/21   Berta Peres MD   Diclofenac Sodium (VOLTAREN) 1 % gel gel Apply 4 g topically to the appropriate area as directed 4 (Four) Times a Day As Needed.    Berta Peres MD   dicyclomine (BENTYL) 20 MG tablet Take 1 tablet by mouth Every 6 (Six) Hours. 11/26/21   Rogelio Ayoub DO   diphenoxylate-atropine (LOMOTIL) 2.5-0.025 MG per tablet Take 1 tablet by mouth 4 (Four) Times a Day As Needed for Diarrhea. 11/26/21   Rogelio Ayoub DO   docusate sodium (COLACE) 100 MG capsule Take 100 mg by mouth 2 (Two) Times a Day.    Berta Peres MD   escitalopram (LEXAPRO) 10 MG tablet Take 5-10 mg by mouth Daily. 6/8/21   Berta Peres MD   ferrous sulfate 325 (65 FE) MG tablet Take 325 mg by mouth Daily With Breakfast.    Berta Peres MD   lamoTRIgine (LaMICtal) 100 MG tablet Take 100 mg by mouth 2 (Two) Times a Day. 5/8/21   Berta Peres MD   levETIRAcetam (KEPPRA) 500 MG tablet Take 500 mg by mouth 2 (Two) Times a Day. 5/21/21   Berta Peres MD   levocetirizine (XYZAL) 5 MG tablet Take 5 mg by  mouth Daily. 6/13/21   Berta Peres MD   losartan (COZAAR) 25 MG tablet Take 25 mg by mouth Daily. 4/18/21   Berta Peres MD   magnesium oxide (MAGOX) 400 (241.3 Mg) MG tablet tablet Take 400 mg by mouth Daily.    Berta Peres MD   methotrexate 2.5 MG tablet Take 15 mg by mouth 1 (One) Time Per Week. Pt takes on Saturday 6/5/21   Berta Peres MD   metoprolol tartrate (LOPRESSOR) 25 MG tablet Take 25 mg by mouth Daily.    Berta Peres MD   nystatin (MYCOSTATIN) 903135 UNIT/GM cream Apply 1 application topically to the appropriate area as directed 2 (Two) Times a Day As Needed for Skin Irritation. 7/17/21   Berta Peres MD   nystatin 962796 UNIT/GM powder Apply 1 application topically to the appropriate area as directed 2 (Two) Times a Day. 5/25/21   Berta Peres MD   pantoprazole (Protonix) 20 MG EC tablet Take 20 mg by mouth Daily.    Berta Peres MD   simvastatin (ZOCOR) 20 MG tablet Take 20 mg by mouth Daily. 7/14/21   Berta Peres MD   Spiriva Respimat 2.5 MCG/ACT aerosol solution inhaler Inhale 2 puffs Daily. 6/10/21   Berta Peres MD   warfarin (COUMADIN) 6 MG tablet Take 6 mg by mouth Take As Directed. 6/15/21   Berta Peres MD        Social History:   Social History     Tobacco Use   • Smoking status: Former Smoker     Packs/day: 0.50     Years: 30.00     Pack years: 15.00   • Smokeless tobacco: Never Used   Vaping Use   • Vaping Use: Never used   Substance Use Topics   • Alcohol use: Never     Comment: NEVER. DOES NOT DRINK    • Drug use: Never     Recent travel: no     Review of Systems:  Review of Systems   Constitutional: Negative for chills and fever.   HENT: Negative for congestion, rhinorrhea and sore throat.    Eyes: Negative for pain and visual disturbance.   Respiratory: Negative for apnea, cough, chest tightness and shortness of breath.    Cardiovascular: Negative for chest pain and palpitations.  "  Gastrointestinal: Negative for abdominal pain, diarrhea, nausea and vomiting.   Genitourinary: Negative for difficulty urinating and dysuria.   Musculoskeletal: Negative for joint swelling and myalgias.   Skin: Negative for color change.   Neurological: Negative for seizures and headaches.   Psychiatric/Behavioral: Negative.    All other systems reviewed and are negative.       Physical Exam:  BP (!) 183/87   Pulse 67   Temp 98.4 °F (36.9 °C)   Resp 16   Ht 160 cm (63\")   Wt 82.6 kg (182 lb 1.6 oz)   LMP  (LMP Unknown)   SpO2 95%   BMI 32.26 kg/m²     Physical Exam  Vitals and nursing note reviewed.   Constitutional:       General: She is not in acute distress.     Appearance: Normal appearance. She is not toxic-appearing.   HENT:      Head: Normocephalic and atraumatic.      Jaw: There is normal jaw occlusion.   Eyes:      General: Lids are normal.      Extraocular Movements: Extraocular movements intact.      Conjunctiva/sclera: Conjunctivae normal.      Pupils: Pupils are equal, round, and reactive to light.   Cardiovascular:      Rate and Rhythm: Normal rate and regular rhythm.      Pulses: Normal pulses.      Heart sounds: Normal heart sounds.   Pulmonary:      Effort: Pulmonary effort is normal. No respiratory distress.      Breath sounds: Normal breath sounds. No wheezing or rhonchi.   Abdominal:      General: Abdomen is flat.      Palpations: Abdomen is soft.      Tenderness: There is no abdominal tenderness. There is no guarding or rebound.   Musculoskeletal:         General: Normal range of motion.      Cervical back: Normal range of motion and neck supple.      Right lower leg: No edema.      Left lower leg: No edema.   Skin:     General: Skin is warm and dry.   Neurological:      Mental Status: She is alert and oriented to person, place, and time. Mental status is at baseline.   Psychiatric:         Mood and Affect: Mood normal.                Medications in the Emergency " Department:  Medications   sodium chloride 0.9 % flush 10 mL (has no administration in time range)   hydrALAZINE (APRESOLINE) injection 20 mg (20 mg Intravenous Given 4/11/22 2010)        Labs  Lab Results (last 24 hours)     Procedure Component Value Units Date/Time    CBC & Differential [[id number]]  (Abnormal) Collected: 04/11/22 1522    Specimen: Blood Updated: 04/11/22 1829    Narrative:      The following orders were created for panel order CBC & Differential.  Procedure                               Abnormality         Status                     ---------                               -----------         ------                     CBC Auto Differential[[id number]]        Abnormal            Final result                 Please view results for these tests on the individual orders.    CK [[id number]]  (Normal) Collected: 04/11/22 1522    Specimen: Blood Updated: 04/11/22 1842     Creatine Kinase 82 U/L     Comprehensive Metabolic Panel [[id number]]  (Abnormal) Collected: 04/11/22 1522    Specimen: Blood Updated: 04/11/22 1842     Glucose 152 mg/dL      BUN 28 mg/dL      Creatinine 1.25 mg/dL      Sodium 141 mmol/L      Potassium 4.5 mmol/L      Chloride 106 mmol/L      CO2 25.9 mmol/L      Calcium 9.3 mg/dL      Total Protein 6.0 g/dL      Albumin 3.20 g/dL      ALT (SGPT) 6 U/L      AST (SGOT) 21 U/L      Alkaline Phosphatase 99 U/L      Total Bilirubin <0.2 mg/dL      Globulin 2.8 gm/dL      A/G Ratio 1.1 g/dL      BUN/Creatinine Ratio 22.4     Anion Gap 9.1 mmol/L      eGFR 47.0 mL/min/1.73      Comment: National Kidney Foundation and American Society of Nephrology (ASN) Task Force recommended calculation based on the Chronic Kidney Disease Epidemiology Collaboration (CKD-EPI) equation refit without adjustment for race.       Narrative:      GFR Normal >60  Chronic Kidney Disease <60  Kidney Failure <15      Lipase [[id number]]  (Normal) Collected: 04/11/22 1522    Specimen: Blood Updated: 04/11/22 1842      Lipase 20 U/L     Magnesium [[id number]]  (Normal) Collected: 04/11/22 1522    Specimen: Blood Updated: 04/11/22 1842     Magnesium 1.6 mg/dL     T4, Free [[id number]]  (Abnormal) Collected: 04/11/22 1522    Specimen: Blood Updated: 04/11/22 1848     Free T4 0.75 ng/dL     Narrative:      Results may be falsely increased if patient taking Biotin.      TSH [[id number]]  (Normal) Collected: 04/11/22 1522    Specimen: Blood Updated: 04/11/22 1848     TSH 2.570 uIU/mL     BNP [[id number]]  (Abnormal) Collected: 04/11/22 1522    Specimen: Blood Updated: 04/11/22 1848     proBNP 1,690.0 pg/mL     Narrative:      Among patients with dyspnea, NT-proBNP is highly sensitive for the detection of acute congestive heart failure. In addition NT-proBNP of <300 pg/ml effectively rules out acute congestive heart failure with 99% negative predictive value.    Results may be falsely decreased if patient taking Biotin.      Troponin [[id number]]  (Normal) Collected: 04/11/22 1522    Specimen: Blood Updated: 04/11/22 1848     Troponin T <0.010 ng/mL     Narrative:      Troponin T Reference Range:  <= 0.03 ng/mL-   Negative for AMI  >0.03 ng/mL-     Abnormal for myocardial necrosis.  Clinicians would have to utilize clinical acumen, EKG, Troponin and serial changes to determine if it is an Acute Myocardial Infarction or myocardial injury due to an underlying chronic condition.       Results may be falsely decreased if patient taking Biotin.      Protime-INR [[id number]]  (Abnormal) Collected: 04/11/22 1522    Specimen: Blood Updated: 04/11/22 1830     Protime 20.0 Seconds      INR 2.06    Narrative:      Suggested Therapeutic Ranges For Oral Anticoagulant Therapy:  Level of Therapy                      INR Target Range  Standard Dose                            2.0-3.0  High Dose                                2.5-3.5  Patients not receiving anticoagulant  Therapy Normal Range                     0.6-1.2    CBC Auto Differential [[id number]]   (Abnormal) Collected: 04/11/22 1522    Specimen: Blood Updated: 04/11/22 1829     WBC 11.09 10*3/mm3      RBC 4.10 10*6/mm3      Hemoglobin 12.7 g/dL      Hematocrit 37.2 %      MCV 90.7 fL      MCH 31.0 pg      MCHC 34.1 g/dL      RDW 13.6 %      RDW-SD 43.8 fl      MPV 9.5 fL      Platelets 239 10*3/mm3      Neutrophil % 73.7 %      Lymphocyte % 18.1 %      Monocyte % 4.6 %      Eosinophil % 2.6 %      Basophil % 0.5 %      Immature Grans % 0.5 %      Neutrophils, Absolute 8.17 10*3/mm3      Lymphocytes, Absolute 2.01 10*3/mm3      Monocytes, Absolute 0.51 10*3/mm3      Eosinophils, Absolute 0.29 10*3/mm3      Basophils, Absolute 0.05 10*3/mm3      Immature Grans, Absolute 0.06 10*3/mm3      nRBC 0.0 /100 WBC            Imaging:  No Radiology Exams Resulted Within Past 24 Hours    Procedures:  Procedures    Progress  ED Course as of 04/11/22 2052 Mon Apr 11, 2022 2048 EKG:    Rhythm: sinus  Rate: 67  Axis: normal  Intervals: LBBB  ST Segment: no elevations    EKG Comparison: no change    Interpreted by me   [BN]      ED Course User Index  [BN] Bereket Reyes MD                            Medical Decision Making:  MDM  Number of Diagnoses or Management Options  Secondary hypertension  Diagnosis management comments: The patient presents to the ED with hypertension. The patient was placed on a monitor in the ED. the patient´s CBC was reviewed and shows no abnormalities of critical concern. Of note, there is no anemia requiring a blood transfusion and the platelet count is acceptable.  The patient´s CMP was reviewed and shows no abnormalities of critical concern. Of note, the patient´s sodium and potassium are acceptable. The patient´s liver enzymes are unremarkable. The patient´s renal function (creatinine) is preserved. The patient has a normal anion gap.  INR is 2.06.  BNP is 1690.  Troponin is negative.  Patient was given hydralazine in the ED and had improvement in her blood pressure.  The blood pressure  is much improved with ED treatment. The patient denies chest pain. The patient has a normal neurological exam and has mental status at baseline. Upon re-examination, the patient has no signs or symptoms of acute end organ damage.  The patient was advised of the importance of medical compliance with an emphasis in awareness of their daily sodium intake. The patient was counseled that elevated blood pressure is detrimental to their heart, eyes, kidneys, and may lead to a stroke. The patient was advised to check their blood pressure regularly and follow up as an outpatient regarding this matter. The patient was counseled to return to the ED for sudden or severe headache, numbness or tingling on one side of the body, facial droop, difficulty speaking, chest pain, or shortness of breath. The patient's condition is stable and appropriate for discharge. The patient will pursue further outpatient evaluation with the primary care physician or other designated or consulting physician as indicated in the discharge instructions.        Amount and/or Complexity of Data Reviewed  Clinical lab tests: reviewed  Tests in the medicine section of CPT®: reviewed  Independent visualization of images, tracings, or specimens: yes    Risk of Complications, Morbidity, and/or Mortality  Presenting problems: moderate  Management options: moderate    Patient Progress  Patient progress: stable       Final diagnoses:   Secondary hypertension        Disposition:  ED Disposition     ED Disposition   Discharge    Condition   Stable    Comment   --             This medical record created using voice recognition software and may contain unintended errors.           Bereket Reyes MD  04/11/22 2052

## 2022-04-18 PROBLEM — I63.511 ACUTE ISCHEMIC RIGHT MCA STROKE (HCC): Status: ACTIVE | Noted: 2022-01-01

## 2022-04-20 PROBLEM — T82.01XA PROSTHETIC AORTIC VALVE FAILURE: Chronic | Status: ACTIVE | Noted: 2022-01-01

## 2022-04-22 PROBLEM — R41.82 AMS (ALTERED MENTAL STATUS): Chronic | Status: ACTIVE | Noted: 2022-01-01

## 2022-04-22 PROBLEM — R00.0 TACHYARRHYTHMIA: Status: ACTIVE | Noted: 2022-01-01
